# Patient Record
Sex: MALE | Race: WHITE | Employment: OTHER | URBAN - METROPOLITAN AREA
[De-identification: names, ages, dates, MRNs, and addresses within clinical notes are randomized per-mention and may not be internally consistent; named-entity substitution may affect disease eponyms.]

---

## 2017-02-07 ENCOUNTER — HOSPITAL ENCOUNTER (OUTPATIENT)
Dept: MRI IMAGING | Age: 67
Discharge: HOME | End: 2017-02-07
Attending: FAMILY MEDICINE
Payer: COMMERCIAL

## 2017-02-07 DIAGNOSIS — R51.9 HEADACHE, UNSPECIFIED HEADACHE TYPE: ICD-10-CM

## 2017-02-07 PROCEDURE — 70551 MRI BRAIN STEM W/O DYE: CPT

## 2017-12-05 ENCOUNTER — PATIENT OUTREACH (OUTPATIENT)
Dept: CASE MANAGEMENT | Age: 67
End: 2017-12-05

## 2017-12-05 NOTE — PROGRESS NOTES
Patient categorized as high risk patient, level 4. Chart reviewed to determine need for CCM services. Patient on Coumadin, recent PT/INR completed. A1c 6. Patient has established care with Dr. Jackquline Paget. No ED visits and last admission 1 year ago for inpatient rehab. Patient attends one year oncology appointment with S    At this time there is no evidence to suggest that CCM services are needed. This note will not be viewable in [address]

## 2017-12-26 PROBLEM — E11.21 TYPE 2 DIABETES MELLITUS WITH NEPHROPATHY (HCC): Status: ACTIVE | Noted: 2017-12-26

## 2018-04-27 PROBLEM — Z79.01 LONG TERM (CURRENT) USE OF ANTICOAGULANTS: Status: ACTIVE | Noted: 2018-04-27

## 2018-12-28 PROBLEM — L03.031 PARONYCHIA OF GREAT TOE OF RIGHT FOOT: Status: ACTIVE | Noted: 2018-01-01

## 2018-12-28 PROBLEM — L60.8 TOENAIL DEFORMITY: Status: ACTIVE | Noted: 2018-01-01

## 2018-12-28 PROBLEM — D50.9 MICROCYTIC ANEMIA: Status: ACTIVE | Noted: 2018-01-01

## 2019-01-01 ENCOUNTER — ANESTHESIA (OUTPATIENT)
Dept: SURGERY | Age: 69
DRG: 492 | End: 2019-01-01
Payer: MEDICARE

## 2019-01-01 ENCOUNTER — HOSPITAL ENCOUNTER (EMERGENCY)
Age: 69
Discharge: HOME | End: 2019-06-22
Attending: EMERGENCY MEDICINE
Payer: MEDICARE

## 2019-01-01 ENCOUNTER — HOSPITAL ENCOUNTER (OUTPATIENT)
Age: 69
Setting detail: OBSERVATION
Discharge: NURSING, REHAB OR LONG-TERM CARE | End: 2019-06-30
Attending: EMERGENCY MEDICINE | Admitting: EMERGENCY MEDICINE
Payer: MEDICARE

## 2019-01-01 ENCOUNTER — HOSPITAL ENCOUNTER (OUTPATIENT)
Dept: SURGERY | Age: 69
Discharge: HOME | End: 2019-07-18
Payer: MEDICARE

## 2019-01-01 ENCOUNTER — ANESTHESIA EVENT (OUTPATIENT)
Dept: SURGERY | Age: 69
DRG: 492 | End: 2019-01-01
Payer: MEDICARE

## 2019-01-01 ENCOUNTER — APPOINTMENT (OUTPATIENT)
Dept: CT IMAGING | Age: 69
End: 2019-01-01
Attending: EMERGENCY MEDICINE
Payer: MEDICARE

## 2019-01-01 ENCOUNTER — HOSPITAL ENCOUNTER (OUTPATIENT)
Dept: LAB | Age: 69
Discharge: HOME | End: 2019-07-01

## 2019-01-01 ENCOUNTER — APPOINTMENT (OUTPATIENT)
Dept: GENERAL RADIOLOGY | Age: 69
DRG: 492 | End: 2019-01-01
Attending: ORTHOPAEDIC SURGERY
Payer: MEDICARE

## 2019-01-01 ENCOUNTER — HOSPITAL ENCOUNTER (EMERGENCY)
Age: 69
Discharge: ANOTHER HOSPITAL | End: 2019-06-25
Attending: EMERGENCY MEDICINE | Admitting: EMERGENCY MEDICINE
Payer: MEDICARE

## 2019-01-01 ENCOUNTER — APPOINTMENT (OUTPATIENT)
Dept: GENERAL RADIOLOGY | Age: 69
End: 2019-01-01
Attending: INTERNAL MEDICINE
Payer: MEDICARE

## 2019-01-01 ENCOUNTER — APPOINTMENT (OUTPATIENT)
Dept: GENERAL RADIOLOGY | Age: 69
End: 2019-01-01
Attending: FAMILY MEDICINE
Payer: MEDICARE

## 2019-01-01 ENCOUNTER — APPOINTMENT (OUTPATIENT)
Dept: GENERAL RADIOLOGY | Age: 69
End: 2019-01-01
Attending: EMERGENCY MEDICINE
Payer: MEDICARE

## 2019-01-01 ENCOUNTER — HOSPITAL ENCOUNTER (OUTPATIENT)
Dept: LAB | Age: 69
Discharge: HOME | End: 2019-07-12

## 2019-01-01 ENCOUNTER — HOSPITAL ENCOUNTER (OUTPATIENT)
Age: 69
Discharge: STILL ADMITTED OR ADMINISTRATIVE | End: 2019-08-07
Attending: INTERNAL MEDICINE | Admitting: INTERNAL MEDICINE

## 2019-01-01 ENCOUNTER — HOSPITAL ENCOUNTER (INPATIENT)
Age: 69
LOS: 3 days | Discharge: NURSING, REHAB OR LONG-TERM CARE | DRG: 492 | End: 2019-07-25
Attending: ORTHOPAEDIC SURGERY | Admitting: FAMILY MEDICINE
Payer: MEDICARE

## 2019-01-01 VITALS
RESPIRATION RATE: 22 BRPM | BODY MASS INDEX: 44.1 KG/M2 | SYSTOLIC BLOOD PRESSURE: 141 MMHG | DIASTOLIC BLOOD PRESSURE: 63 MMHG | HEIGHT: 71 IN | TEMPERATURE: 97.3 F | HEART RATE: 51 BPM | WEIGHT: 315 LBS | OXYGEN SATURATION: 94 %

## 2019-01-01 VITALS
DIASTOLIC BLOOD PRESSURE: 64 MMHG | RESPIRATION RATE: 18 BRPM | HEART RATE: 65 BPM | WEIGHT: 315 LBS | BODY MASS INDEX: 42.66 KG/M2 | TEMPERATURE: 97.9 F | OXYGEN SATURATION: 98 % | SYSTOLIC BLOOD PRESSURE: 126 MMHG | HEIGHT: 72 IN

## 2019-01-01 VITALS
OXYGEN SATURATION: 93 % | DIASTOLIC BLOOD PRESSURE: 56 MMHG | TEMPERATURE: 98.7 F | WEIGHT: 315 LBS | HEART RATE: 65 BPM | RESPIRATION RATE: 20 BRPM | SYSTOLIC BLOOD PRESSURE: 101 MMHG | BODY MASS INDEX: 49.75 KG/M2

## 2019-01-01 VITALS
OXYGEN SATURATION: 97 % | HEART RATE: 70 BPM | SYSTOLIC BLOOD PRESSURE: 97 MMHG | TEMPERATURE: 97 F | RESPIRATION RATE: 16 BRPM | DIASTOLIC BLOOD PRESSURE: 48 MMHG

## 2019-01-01 VITALS
HEART RATE: 63 BPM | OXYGEN SATURATION: 98 % | DIASTOLIC BLOOD PRESSURE: 88 MMHG | TEMPERATURE: 98 F | SYSTOLIC BLOOD PRESSURE: 147 MMHG | RESPIRATION RATE: 20 BRPM

## 2019-01-01 DIAGNOSIS — R07.9 CHEST PAIN, UNSPECIFIED TYPE: ICD-10-CM

## 2019-01-01 DIAGNOSIS — I48.0 PAROXYSMAL ATRIAL FIBRILLATION WITH RAPID VENTRICULAR RESPONSE (HCC): ICD-10-CM

## 2019-01-01 DIAGNOSIS — S42.392A OTHER CLOSED FRACTURE OF SHAFT OF LEFT HUMERUS, INITIAL ENCOUNTER: Primary | ICD-10-CM

## 2019-01-01 DIAGNOSIS — S42.295D OTHER CLOSED NONDISPLACED FRACTURE OF PROXIMAL END OF LEFT HUMERUS WITH ROUTINE HEALING, SUBSEQUENT ENCOUNTER: Primary | ICD-10-CM

## 2019-01-01 DIAGNOSIS — S20.229A CONTUSION OF BACK, UNSPECIFIED LATERALITY, INITIAL ENCOUNTER: Primary | ICD-10-CM

## 2019-01-01 DIAGNOSIS — N17.9 AKI (ACUTE KIDNEY INJURY) (HCC): ICD-10-CM

## 2019-01-01 DIAGNOSIS — D50.9 IRON DEFICIENCY ANEMIA, UNSPECIFIED IRON DEFICIENCY ANEMIA TYPE: ICD-10-CM

## 2019-01-01 LAB
ABO + RH BLD: NORMAL
ALBUMIN SERPL-MCNC: 2.1 G/DL (ref 3.2–4.6)
ALBUMIN SERPL-MCNC: 2.4 G/DL (ref 3.2–4.6)
ALBUMIN SERPL-MCNC: 2.9 G/DL (ref 3.2–4.6)
ALBUMIN SERPL-MCNC: 3 G/DL (ref 3.2–4.6)
ALBUMIN/GLOB SERPL: 0.4 {RATIO} (ref 1.2–3.5)
ALBUMIN/GLOB SERPL: 0.5 {RATIO} (ref 1.2–3.5)
ALBUMIN/GLOB SERPL: 0.6 {RATIO} (ref 1.2–3.5)
ALBUMIN/GLOB SERPL: 0.6 {RATIO} (ref 1.2–3.5)
ALP SERPL-CCNC: 217 U/L (ref 50–136)
ALP SERPL-CCNC: 222 U/L (ref 50–136)
ALP SERPL-CCNC: 269 U/L (ref 50–136)
ALP SERPL-CCNC: 291 U/L (ref 50–136)
ALT SERPL-CCNC: 23 U/L (ref 12–65)
ALT SERPL-CCNC: 23 U/L (ref 12–65)
ALT SERPL-CCNC: 26 U/L (ref 12–65)
ALT SERPL-CCNC: 28 U/L (ref 12–65)
AMMONIA PLAS-SCNC: 17 UMOL/L (ref 11–32)
AMMONIA PLAS-SCNC: 17 UMOL/L (ref 24.9–68)
ANION GAP SERPL CALC-SCNC: 10 MMOL/L (ref 7–16)
ANION GAP SERPL CALC-SCNC: 10 MMOL/L (ref 7–16)
ANION GAP SERPL CALC-SCNC: 11 MMOL/L (ref 7–16)
ANION GAP SERPL CALC-SCNC: 4 MMOL/L (ref 7–16)
ANION GAP SERPL CALC-SCNC: 5 MMOL/L (ref 7–16)
ANION GAP SERPL CALC-SCNC: 6 MMOL/L (ref 7–16)
ANION GAP SERPL CALC-SCNC: 6 MMOL/L (ref 7–16)
ANION GAP SERPL CALC-SCNC: 7 MMOL/L (ref 7–16)
ANION GAP SERPL CALC-SCNC: 7 MMOL/L (ref 7–16)
ANION GAP SERPL CALC-SCNC: 8 MMOL/L (ref 7–16)
ANION GAP SERPL CALC-SCNC: 9 MMOL/L (ref 7–16)
APPEARANCE UR: ABNORMAL
APPEARANCE UR: CLEAR
APTT PPP: 43.8 SEC (ref 24.7–39.8)
APTT PPP: 44.1 SEC (ref 24.7–39.8)
ARTERIAL PATENCY WRIST A: NO
AST SERPL-CCNC: 19 U/L (ref 15–37)
AST SERPL-CCNC: 26 U/L (ref 15–37)
AST SERPL-CCNC: 27 U/L (ref 15–37)
AST SERPL-CCNC: 35 U/L (ref 15–37)
ATRIAL RATE: 133 BPM
ATRIAL RATE: 416 BPM
BACTERIA SPEC CULT: ABNORMAL
BACTERIA URNS QL MICRO: 0 /HPF
BASE DEFICIT BLD-SCNC: 2 MMOL/L
BASOPHILS # BLD: 0.1 K/UL (ref 0–0.2)
BASOPHILS NFR BLD: 1 % (ref 0–2)
BDY SITE: NORMAL
BILIRUB DIRECT SERPL-MCNC: 0.2 MG/DL
BILIRUB DIRECT SERPL-MCNC: 0.3 MG/DL
BILIRUB SERPL-MCNC: 0.5 MG/DL (ref 0.2–1.1)
BILIRUB SERPL-MCNC: 0.6 MG/DL (ref 0.2–1.1)
BILIRUB UR QL: NEGATIVE
BILIRUB UR QL: NEGATIVE
BLD PROD TYP BPU: NORMAL
BLD PROD TYP BPU: NORMAL
BLOOD GROUP ANTIBODIES SERPL: NORMAL
BODY TEMPERATURE: 98.6
BPU ID: NORMAL
BPU ID: NORMAL
BUN SERPL-MCNC: 37 MG/DL (ref 8–23)
BUN SERPL-MCNC: 41 MG/DL (ref 8–23)
BUN SERPL-MCNC: 43 MG/DL (ref 8–23)
BUN SERPL-MCNC: 50 MG/DL (ref 8–23)
BUN SERPL-MCNC: 50 MG/DL (ref 8–23)
BUN SERPL-MCNC: 52 MG/DL (ref 8–23)
BUN SERPL-MCNC: 54 MG/DL (ref 8–23)
BUN SERPL-MCNC: 57 MG/DL (ref 8–23)
BUN SERPL-MCNC: 59 MG/DL (ref 8–23)
BUN SERPL-MCNC: 72 MG/DL (ref 8–23)
BUN SERPL-MCNC: 83 MG/DL (ref 8–23)
CALCIUM SERPL-MCNC: 7.7 MG/DL (ref 8.3–10.4)
CALCIUM SERPL-MCNC: 8.1 MG/DL (ref 8.3–10.4)
CALCIUM SERPL-MCNC: 8.2 MG/DL (ref 8.3–10.4)
CALCIUM SERPL-MCNC: 8.2 MG/DL (ref 8.3–10.4)
CALCIUM SERPL-MCNC: 8.4 MG/DL (ref 8.3–10.4)
CALCIUM SERPL-MCNC: 8.5 MG/DL (ref 8.3–10.4)
CALCIUM SERPL-MCNC: 8.6 MG/DL (ref 8.3–10.4)
CALCIUM SERPL-MCNC: 8.7 MG/DL (ref 8.3–10.4)
CALCIUM SERPL-MCNC: 8.7 MG/DL (ref 8.3–10.4)
CALCULATED R AXIS, ECG10: 51 DEGREES
CALCULATED R AXIS, ECG10: 60 DEGREES
CALCULATED T AXIS, ECG11: 149 DEGREES
CALCULATED T AXIS, ECG11: 151 DEGREES
CASTS URNS QL MICRO: ABNORMAL /LPF
CHLORIDE SERPL-SCNC: 100 MMOL/L (ref 98–107)
CHLORIDE SERPL-SCNC: 100 MMOL/L (ref 98–107)
CHLORIDE SERPL-SCNC: 101 MMOL/L (ref 98–107)
CHLORIDE SERPL-SCNC: 102 MMOL/L (ref 98–107)
CHLORIDE SERPL-SCNC: 103 MMOL/L (ref 98–107)
CHLORIDE SERPL-SCNC: 103 MMOL/L (ref 98–107)
CHLORIDE SERPL-SCNC: 105 MMOL/L (ref 98–107)
CHLORIDE SERPL-SCNC: 106 MMOL/L (ref 98–107)
CHLORIDE SERPL-SCNC: 97 MMOL/L (ref 98–107)
CO2 BLD-SCNC: 24 MMOL/L
CO2 SERPL-SCNC: 24 MMOL/L (ref 21–32)
CO2 SERPL-SCNC: 24 MMOL/L (ref 21–32)
CO2 SERPL-SCNC: 25 MMOL/L (ref 21–32)
CO2 SERPL-SCNC: 27 MMOL/L (ref 21–32)
CO2 SERPL-SCNC: 29 MMOL/L (ref 21–32)
CO2 SERPL-SCNC: 29 MMOL/L (ref 21–32)
CO2 SERPL-SCNC: 31 MMOL/L (ref 21–32)
CO2 SERPL-SCNC: 31 MMOL/L (ref 21–32)
CO2 SERPL-SCNC: 32 MMOL/L (ref 21–32)
CO2 SERPL-SCNC: 32 MMOL/L (ref 21–32)
CO2 SERPL-SCNC: 34 MMOL/L (ref 21–32)
COLLECT TIME,HTIME: 1505
COLOR UR: YELLOW
COLOR UR: YELLOW
CREAT SERPL-MCNC: 1.19 MG/DL (ref 0.8–1.5)
CREAT SERPL-MCNC: 1.2 MG/DL (ref 0.8–1.5)
CREAT SERPL-MCNC: 1.35 MG/DL (ref 0.8–1.5)
CREAT SERPL-MCNC: 1.47 MG/DL (ref 0.8–1.5)
CREAT SERPL-MCNC: 1.48 MG/DL (ref 0.8–1.5)
CREAT SERPL-MCNC: 1.52 MG/DL (ref 0.8–1.5)
CREAT SERPL-MCNC: 1.65 MG/DL (ref 0.8–1.5)
CREAT SERPL-MCNC: 1.86 MG/DL (ref 0.8–1.5)
CREAT SERPL-MCNC: 1.87 MG/DL (ref 0.8–1.5)
CREAT SERPL-MCNC: 1.9 MG/DL (ref 0.8–1.5)
CREAT SERPL-MCNC: 2.47 MG/DL (ref 0.8–1.5)
CROSSMATCH RESULT,%XM: NORMAL
CROSSMATCH RESULT,%XM: NORMAL
DIAGNOSIS, 93000: NORMAL
DIAGNOSIS, 93000: NORMAL
DIFFERENTIAL METHOD BLD: ABNORMAL
EOSINOPHIL # BLD: 0.1 K/UL (ref 0–0.8)
EOSINOPHIL # BLD: 0.1 K/UL (ref 0–0.8)
EOSINOPHIL # BLD: 0.2 K/UL (ref 0–0.8)
EOSINOPHIL # BLD: 0.3 K/UL (ref 0–0.8)
EOSINOPHIL NFR BLD: 1 % (ref 0.5–7.8)
EOSINOPHIL NFR BLD: 1 % (ref 0.5–7.8)
EOSINOPHIL NFR BLD: 2 % (ref 0.5–7.8)
EOSINOPHIL NFR BLD: 3 % (ref 0.5–7.8)
EPI CELLS #/AREA URNS HPF: ABNORMAL /HPF
ERYTHROCYTE [DISTWIDTH] IN BLOOD BY AUTOMATED COUNT: 18.9 % (ref 11.9–14.6)
ERYTHROCYTE [DISTWIDTH] IN BLOOD BY AUTOMATED COUNT: 19.8 % (ref 11.9–14.6)
ERYTHROCYTE [DISTWIDTH] IN BLOOD BY AUTOMATED COUNT: 19.9 % (ref 11.9–14.6)
ERYTHROCYTE [DISTWIDTH] IN BLOOD BY AUTOMATED COUNT: 20.4 % (ref 11.9–14.6)
ERYTHROCYTE [DISTWIDTH] IN BLOOD BY AUTOMATED COUNT: 20.9 % (ref 11.9–14.6)
ERYTHROCYTE [DISTWIDTH] IN BLOOD BY AUTOMATED COUNT: 20.9 % (ref 11.9–14.6)
ERYTHROCYTE [DISTWIDTH] IN BLOOD BY AUTOMATED COUNT: 21.7 % (ref 11.9–14.6)
ERYTHROCYTE [DISTWIDTH] IN BLOOD BY AUTOMATED COUNT: 22.1 % (ref 11.9–14.6)
ERYTHROCYTE [DISTWIDTH] IN BLOOD BY AUTOMATED COUNT: 22.5 % (ref 11.9–14.6)
ERYTHROCYTE [DISTWIDTH] IN BLOOD BY AUTOMATED COUNT: 22.8 % (ref 11.9–14.6)
ERYTHROCYTE [DISTWIDTH] IN BLOOD BY AUTOMATED COUNT: 23.4 % (ref 11.9–14.6)
ERYTHROCYTE [DISTWIDTH] IN BLOOD BY AUTOMATED COUNT: 23.4 % (ref 11.9–14.6)
EST. AVERAGE GLUCOSE BLD GHB EST-MCNC: 171 MG/DL
EST. AVERAGE GLUCOSE BLD GHB EST-MCNC: 197 MG/DL
FLOW RATE ISTAT,IFRATE: 4 L/MIN
FOLATE SERPL-MCNC: 4.2 NG/ML (ref 3.1–17.5)
GAS FLOW.O2 O2 DELIVERY SYS: NORMAL L/MIN
GLOBULIN SER CALC-MCNC: 4.7 G/DL (ref 2.3–3.5)
GLOBULIN SER CALC-MCNC: 4.7 G/DL (ref 2.3–3.5)
GLOBULIN SER CALC-MCNC: 5 G/DL (ref 2.3–3.5)
GLOBULIN SER CALC-MCNC: 5 G/DL (ref 2.3–3.5)
GLUCOSE BLD STRIP.AUTO-MCNC: 187 MG/DL (ref 65–100)
GLUCOSE BLD STRIP.AUTO-MCNC: 193 MG/DL (ref 65–100)
GLUCOSE BLD STRIP.AUTO-MCNC: 197 MG/DL (ref 65–100)
GLUCOSE BLD STRIP.AUTO-MCNC: 206 MG/DL (ref 65–100)
GLUCOSE BLD STRIP.AUTO-MCNC: 210 MG/DL (ref 65–100)
GLUCOSE BLD STRIP.AUTO-MCNC: 212 MG/DL (ref 65–100)
GLUCOSE BLD STRIP.AUTO-MCNC: 215 MG/DL (ref 65–100)
GLUCOSE BLD STRIP.AUTO-MCNC: 223 MG/DL (ref 65–100)
GLUCOSE BLD STRIP.AUTO-MCNC: 224 MG/DL (ref 65–100)
GLUCOSE BLD STRIP.AUTO-MCNC: 234 MG/DL (ref 65–100)
GLUCOSE BLD STRIP.AUTO-MCNC: 239 MG/DL (ref 65–100)
GLUCOSE BLD STRIP.AUTO-MCNC: 241 MG/DL (ref 65–100)
GLUCOSE BLD STRIP.AUTO-MCNC: 247 MG/DL (ref 65–100)
GLUCOSE BLD STRIP.AUTO-MCNC: 264 MG/DL (ref 65–100)
GLUCOSE BLD STRIP.AUTO-MCNC: 266 MG/DL (ref 65–100)
GLUCOSE BLD STRIP.AUTO-MCNC: 269 MG/DL (ref 65–100)
GLUCOSE BLD STRIP.AUTO-MCNC: 269 MG/DL (ref 65–100)
GLUCOSE BLD STRIP.AUTO-MCNC: 270 MG/DL (ref 65–100)
GLUCOSE BLD STRIP.AUTO-MCNC: 271 MG/DL (ref 65–100)
GLUCOSE BLD STRIP.AUTO-MCNC: 272 MG/DL (ref 65–100)
GLUCOSE BLD STRIP.AUTO-MCNC: 272 MG/DL (ref 65–100)
GLUCOSE BLD STRIP.AUTO-MCNC: 281 MG/DL (ref 65–100)
GLUCOSE BLD STRIP.AUTO-MCNC: 283 MG/DL (ref 65–100)
GLUCOSE BLD STRIP.AUTO-MCNC: 283 MG/DL (ref 65–100)
GLUCOSE BLD STRIP.AUTO-MCNC: 289 MG/DL (ref 65–100)
GLUCOSE BLD STRIP.AUTO-MCNC: 291 MG/DL (ref 65–100)
GLUCOSE BLD STRIP.AUTO-MCNC: 292 MG/DL (ref 65–100)
GLUCOSE BLD STRIP.AUTO-MCNC: 295 MG/DL (ref 65–100)
GLUCOSE BLD STRIP.AUTO-MCNC: 308 MG/DL (ref 65–100)
GLUCOSE BLD STRIP.AUTO-MCNC: 309 MG/DL (ref 65–100)
GLUCOSE BLD STRIP.AUTO-MCNC: 317 MG/DL (ref 65–100)
GLUCOSE BLD STRIP.AUTO-MCNC: 318 MG/DL (ref 65–100)
GLUCOSE BLD STRIP.AUTO-MCNC: 337 MG/DL (ref 65–100)
GLUCOSE SERPL-MCNC: 164 MG/DL (ref 65–100)
GLUCOSE SERPL-MCNC: 186 MG/DL (ref 65–100)
GLUCOSE SERPL-MCNC: 194 MG/DL (ref 65–100)
GLUCOSE SERPL-MCNC: 219 MG/DL (ref 65–100)
GLUCOSE SERPL-MCNC: 235 MG/DL (ref 65–100)
GLUCOSE SERPL-MCNC: 251 MG/DL (ref 65–100)
GLUCOSE SERPL-MCNC: 253 MG/DL (ref 65–100)
GLUCOSE SERPL-MCNC: 258 MG/DL (ref 65–100)
GLUCOSE SERPL-MCNC: 263 MG/DL (ref 65–100)
GLUCOSE SERPL-MCNC: 272 MG/DL (ref 65–100)
GLUCOSE SERPL-MCNC: 286 MG/DL (ref 65–100)
GLUCOSE UR STRIP.AUTO-MCNC: NEGATIVE MG/DL
GLUCOSE UR STRIP.AUTO-MCNC: NEGATIVE MG/DL
HBA1C MFR BLD: 7.6 %
HBA1C MFR BLD: 8.5 %
HCO3 BLD-SCNC: 23 MMOL/L (ref 22–26)
HCT VFR BLD AUTO: 28.4 % (ref 41.1–50.3)
HCT VFR BLD AUTO: 28.6 % (ref 41.1–50.3)
HCT VFR BLD AUTO: 29.1 % (ref 41.1–50.3)
HCT VFR BLD AUTO: 29.2 % (ref 41.1–50.3)
HCT VFR BLD AUTO: 29.7 % (ref 41.1–50.3)
HCT VFR BLD AUTO: 30.3 % (ref 41.1–50.3)
HCT VFR BLD AUTO: 30.5 % (ref 41.1–50.3)
HCT VFR BLD AUTO: 31.1 % (ref 41.1–50.3)
HCT VFR BLD AUTO: 31.7 % (ref 41.1–50.3)
HCT VFR BLD AUTO: 31.8 % (ref 41.1–50.3)
HCT VFR BLD AUTO: 31.9 % (ref 41.1–50.3)
HCT VFR BLD AUTO: 32.2 % (ref 41.1–50.3)
HCT VFR BLD AUTO: 32.8 % (ref 41.1–50.3)
HCT VFR BLD AUTO: 33 % (ref 41.1–50.3)
HCT VFR BLD AUTO: 33.3 % (ref 41.1–50.3)
HCT VFR BLD AUTO: 33.7 % (ref 41.1–50.3)
HGB BLD-MCNC: 10 G/DL (ref 13.6–17.2)
HGB BLD-MCNC: 8.3 G/DL (ref 13.6–17.2)
HGB BLD-MCNC: 8.8 G/DL (ref 13.6–17.2)
HGB BLD-MCNC: 8.8 G/DL (ref 13.6–17.2)
HGB BLD-MCNC: 8.9 G/DL (ref 13.6–17.2)
HGB BLD-MCNC: 9.1 G/DL (ref 13.6–17.2)
HGB BLD-MCNC: 9.1 G/DL (ref 13.6–17.2)
HGB BLD-MCNC: 9.2 G/DL (ref 13.6–17.2)
HGB BLD-MCNC: 9.3 G/DL (ref 13.6–17.2)
HGB BLD-MCNC: 9.4 G/DL (ref 13.6–17.2)
HGB BLD-MCNC: 9.4 G/DL (ref 13.6–17.2)
HGB BLD-MCNC: 9.6 G/DL (ref 13.6–17.2)
HGB BLD-MCNC: 9.7 G/DL (ref 13.6–17.2)
HGB BLD-MCNC: 9.9 G/DL (ref 13.6–17.2)
HGB UR QL STRIP: ABNORMAL
HGB UR QL STRIP: NEGATIVE
IMM GRANULOCYTES # BLD AUTO: 0 K/UL (ref 0–0.5)
IMM GRANULOCYTES # BLD AUTO: 0.1 K/UL (ref 0–0.5)
IMM GRANULOCYTES # BLD AUTO: 0.3 K/UL (ref 0–0.5)
IMM GRANULOCYTES NFR BLD AUTO: 0 % (ref 0–5)
IMM GRANULOCYTES NFR BLD AUTO: 1 % (ref 0–5)
IMM GRANULOCYTES NFR BLD AUTO: 2 % (ref 0–5)
INR BLD: 2.2 (ref 0.9–1.2)
INR PPP: 1.9
INR PPP: 2
INR PPP: 2.1
INR PPP: 2.2
INR PPP: 2.4
INR PPP: 2.4
INR PPP: 2.5
INR PPP: 2.5
INR PPP: 2.9
INR PPP: 2.9
KETONES UR QL STRIP.AUTO: NEGATIVE MG/DL
KETONES UR QL STRIP.AUTO: NEGATIVE MG/DL
LACTATE SERPL-SCNC: 1.5 MMOL/L (ref 0.4–2)
LEUKOCYTE ESTERASE UR QL STRIP.AUTO: ABNORMAL
LEUKOCYTE ESTERASE UR QL STRIP.AUTO: NEGATIVE
LYMPHOCYTES # BLD: 1.2 K/UL (ref 0.5–4.6)
LYMPHOCYTES # BLD: 1.2 K/UL (ref 0.5–4.6)
LYMPHOCYTES # BLD: 1.3 K/UL (ref 0.5–4.6)
LYMPHOCYTES # BLD: 1.6 K/UL (ref 0.5–4.6)
LYMPHOCYTES NFR BLD: 11 % (ref 13–44)
LYMPHOCYTES NFR BLD: 11 % (ref 13–44)
LYMPHOCYTES NFR BLD: 12 % (ref 13–44)
LYMPHOCYTES NFR BLD: 14 % (ref 13–44)
LYMPHOCYTES NFR BLD: 14 % (ref 13–44)
LYMPHOCYTES NFR BLD: 18 % (ref 13–44)
MAGNESIUM SERPL-MCNC: 1.5 MG/DL (ref 1.8–2.4)
MAGNESIUM SERPL-MCNC: 1.6 MG/DL (ref 1.8–2.4)
MAGNESIUM SERPL-MCNC: 1.6 MG/DL (ref 1.8–2.4)
MAGNESIUM SERPL-MCNC: 1.7 MG/DL (ref 1.8–2.4)
MAGNESIUM SERPL-MCNC: 1.8 MG/DL (ref 1.8–2.4)
MAGNESIUM SERPL-MCNC: 2 MG/DL (ref 1.8–2.4)
MCH RBC QN AUTO: 20 PG (ref 26.1–32.9)
MCH RBC QN AUTO: 20.2 PG (ref 26.1–32.9)
MCH RBC QN AUTO: 20.3 PG (ref 26.1–32.9)
MCH RBC QN AUTO: 20.4 PG (ref 26.1–32.9)
MCH RBC QN AUTO: 20.4 PG (ref 26.1–32.9)
MCH RBC QN AUTO: 20.5 PG (ref 26.1–32.9)
MCH RBC QN AUTO: 20.8 PG (ref 26.1–32.9)
MCH RBC QN AUTO: 20.8 PG (ref 26.1–32.9)
MCH RBC QN AUTO: 21.6 PG (ref 26.1–32.9)
MCH RBC QN AUTO: 22.1 PG (ref 26.1–32.9)
MCH RBC QN AUTO: 22.3 PG (ref 26.1–32.9)
MCH RBC QN AUTO: 22.6 PG (ref 26.1–32.9)
MCHC RBC AUTO-ENTMCNC: 27.6 G/DL (ref 31.4–35)
MCHC RBC AUTO-ENTMCNC: 27.6 G/DL (ref 31.4–35)
MCHC RBC AUTO-ENTMCNC: 28.5 G/DL (ref 31.4–35)
MCHC RBC AUTO-ENTMCNC: 28.9 G/DL (ref 31.4–35)
MCHC RBC AUTO-ENTMCNC: 29 G/DL (ref 31.4–35)
MCHC RBC AUTO-ENTMCNC: 29.2 G/DL (ref 31.4–35)
MCHC RBC AUTO-ENTMCNC: 29.2 G/DL (ref 31.4–35)
MCHC RBC AUTO-ENTMCNC: 29.3 G/DL (ref 31.4–35)
MCHC RBC AUTO-ENTMCNC: 30 G/DL (ref 31.4–35)
MCHC RBC AUTO-ENTMCNC: 30.5 G/DL (ref 31.4–35)
MCV RBC AUTO: 70.6 FL (ref 79.6–97.8)
MCV RBC AUTO: 71 FL (ref 79.6–97.8)
MCV RBC AUTO: 71.4 FL (ref 79.6–97.8)
MCV RBC AUTO: 71.5 FL (ref 79.6–97.8)
MCV RBC AUTO: 71.5 FL (ref 79.6–97.8)
MCV RBC AUTO: 71.9 FL (ref 79.6–97.8)
MCV RBC AUTO: 72.5 FL (ref 79.6–97.8)
MCV RBC AUTO: 73 FL (ref 79.6–97.8)
MCV RBC AUTO: 73.7 FL (ref 79.6–97.8)
MCV RBC AUTO: 73.8 FL (ref 79.6–97.8)
MCV RBC AUTO: 74 FL (ref 79.6–97.8)
MCV RBC AUTO: 76.2 FL (ref 79.6–97.8)
MM INDURATION POC: 0 MM (ref 0–5)
MM INDURATION POC: NORMAL 0 NEGATIVE (ref 0–5)
MONOCYTES # BLD: 0.7 K/UL (ref 0.1–1.3)
MONOCYTES # BLD: 0.7 K/UL (ref 0.1–1.3)
MONOCYTES # BLD: 0.8 K/UL (ref 0.1–1.3)
MONOCYTES # BLD: 0.8 K/UL (ref 0.1–1.3)
MONOCYTES # BLD: 0.9 K/UL (ref 0.1–1.3)
MONOCYTES # BLD: 1 K/UL (ref 0.1–1.3)
MONOCYTES NFR BLD: 10 % (ref 4–12)
MONOCYTES NFR BLD: 7 % (ref 4–12)
MONOCYTES NFR BLD: 7 % (ref 4–12)
MONOCYTES NFR BLD: 8 % (ref 4–12)
MONOCYTES NFR BLD: 8 % (ref 4–12)
MONOCYTES NFR BLD: 9 % (ref 4–12)
NEUTS SEG # BLD: 6.4 K/UL (ref 1.7–8.2)
NEUTS SEG # BLD: 6.5 K/UL (ref 1.7–8.2)
NEUTS SEG # BLD: 6.8 K/UL (ref 1.7–8.2)
NEUTS SEG # BLD: 8.1 K/UL (ref 1.7–8.2)
NEUTS SEG # BLD: 8.5 K/UL (ref 1.7–8.2)
NEUTS SEG # BLD: 9.3 K/UL (ref 1.7–8.2)
NEUTS SEG NFR BLD: 70 % (ref 43–78)
NEUTS SEG NFR BLD: 74 % (ref 43–78)
NEUTS SEG NFR BLD: 74 % (ref 43–78)
NEUTS SEG NFR BLD: 77 % (ref 43–78)
NEUTS SEG NFR BLD: 78 % (ref 43–78)
NEUTS SEG NFR BLD: 78 % (ref 43–78)
NITRITE UR QL STRIP.AUTO: NEGATIVE
NITRITE UR QL STRIP.AUTO: POSITIVE
NRBC # BLD: 0 K/UL (ref 0–0.2)
NRBC # BLD: 0.02 K/UL (ref 0–0.2)
NRBC # BLD: 0.02 K/UL (ref 0–0.2)
NRBC # BLD: 0.04 K/UL (ref 0–0.2)
NRBC # BLD: 0.06 K/UL (ref 0–0.2)
NRBC # BLD: 0.07 K/UL (ref 0–0.2)
OTHER OBSERVATIONS,UCOM: ABNORMAL
PCO2 BLD: 38.2 MMHG (ref 35–45)
PH BLD: 7.39 [PH] (ref 7.35–7.45)
PH UR STRIP: 5 [PH] (ref 5–9)
PH UR STRIP: 6.5 [PH] (ref 5–9)
PLATELET # BLD AUTO: 227 K/UL (ref 150–450)
PLATELET # BLD AUTO: 232 K/UL (ref 150–450)
PLATELET # BLD AUTO: 232 K/UL (ref 150–450)
PLATELET # BLD AUTO: 262 K/UL (ref 150–450)
PLATELET # BLD AUTO: 275 K/UL (ref 150–450)
PLATELET # BLD AUTO: 285 K/UL (ref 150–450)
PLATELET # BLD AUTO: 287 K/UL (ref 150–450)
PLATELET # BLD AUTO: 288 K/UL (ref 150–450)
PLATELET # BLD AUTO: 296 K/UL (ref 150–450)
PLATELET # BLD AUTO: 301 K/UL (ref 150–450)
PLATELET # BLD AUTO: 305 K/UL (ref 150–450)
PLATELET # BLD AUTO: 335 K/UL (ref 150–450)
PMV BLD AUTO: 10.4 FL (ref 9.4–12.3)
PMV BLD AUTO: 10.5 FL (ref 9.4–12.3)
PMV BLD AUTO: 10.7 FL (ref 9.4–12.3)
PMV BLD AUTO: 10.8 FL (ref 9.4–12.3)
PMV BLD AUTO: 10.9 FL (ref 9.4–12.3)
PMV BLD AUTO: 11 FL (ref 9.4–12.3)
PMV BLD AUTO: 11 FL (ref 9.4–12.3)
PMV BLD AUTO: 11.1 FL (ref 9.4–12.3)
PMV BLD AUTO: 11.1 FL (ref 9.4–12.3)
PMV BLD AUTO: 11.2 FL (ref 9.4–12.3)
PO2 BLD: 87 MMHG (ref 75–100)
POTASSIUM SERPL-SCNC: 3.6 MMOL/L (ref 3.5–5.1)
POTASSIUM SERPL-SCNC: 3.7 MMOL/L (ref 3.5–5.1)
POTASSIUM SERPL-SCNC: 3.9 MMOL/L (ref 3.5–5.1)
POTASSIUM SERPL-SCNC: 4 MMOL/L (ref 3.5–5.1)
POTASSIUM SERPL-SCNC: 4 MMOL/L (ref 3.5–5.1)
POTASSIUM SERPL-SCNC: 4.1 MMOL/L (ref 3.5–5.1)
POTASSIUM SERPL-SCNC: 4.1 MMOL/L (ref 3.5–5.1)
POTASSIUM SERPL-SCNC: 4.2 MMOL/L (ref 3.5–5.1)
POTASSIUM SERPL-SCNC: 4.3 MMOL/L (ref 3.5–5.1)
POTASSIUM SERPL-SCNC: 4.4 MMOL/L (ref 3.5–5.1)
POTASSIUM SERPL-SCNC: 4.9 MMOL/L (ref 3.5–5.1)
PPD POC: NEGATIVE NEGATIVE
PPD POC: NORMAL NEGATIVE
PROCALCITONIN SERPL-MCNC: 0.1 NG/ML
PROT SERPL-MCNC: 6.8 G/DL (ref 6.3–8.2)
PROT SERPL-MCNC: 7.4 G/DL (ref 6.3–8.2)
PROT SERPL-MCNC: 7.6 G/DL (ref 6.3–8.2)
PROT SERPL-MCNC: 8 G/DL (ref 6.3–8.2)
PROT UR STRIP-MCNC: 100 MG/DL
PROT UR STRIP-MCNC: NEGATIVE MG/DL
PROTHROMBIN TIME: 21.5 SEC (ref 11.7–14.5)
PROTHROMBIN TIME: 22.2 SEC (ref 11.7–14.5)
PROTHROMBIN TIME: 23.4 SEC (ref 11.7–14.5)
PROTHROMBIN TIME: 24.8 SEC (ref 11.7–14.5)
PROTHROMBIN TIME: 25 SEC (ref 11.7–14.5)
PROTHROMBIN TIME: 25 SEC (ref 11.7–14.5)
PROTHROMBIN TIME: 25.8 SEC (ref 11.7–14.5)
PROTHROMBIN TIME: 26.3 SEC (ref 11.7–14.5)
PROTHROMBIN TIME: 26.6 SEC (ref 11.7–14.5)
PROTHROMBIN TIME: 26.8 SEC (ref 11.7–14.5)
PROTHROMBIN TIME: 29.7 SEC (ref 11.7–14.5)
PROTHROMBIN TIME: 30.8 SEC (ref 11.7–14.5)
PT BLD: 25.7 SECS (ref 9.6–11.6)
Q-T INTERVAL, ECG07: 398 MS
Q-T INTERVAL, ECG07: 458 MS
QRS DURATION, ECG06: 106 MS
QRS DURATION, ECG06: 94 MS
QTC CALCULATION (BEZET), ECG08: 447 MS
QTC CALCULATION (BEZET), ECG08: 480 MS
RBC # BLD AUTO: 4 M/UL (ref 4.23–5.6)
RBC # BLD AUTO: 4 M/UL (ref 4.23–5.6)
RBC # BLD AUTO: 4.07 M/UL (ref 4.23–5.6)
RBC # BLD AUTO: 4.08 M/UL (ref 4.23–5.6)
RBC # BLD AUTO: 4.11 M/UL (ref 4.23–5.6)
RBC # BLD AUTO: 4.31 M/UL (ref 4.23–5.6)
RBC # BLD AUTO: 4.32 M/UL (ref 4.23–5.6)
RBC # BLD AUTO: 4.4 M/UL (ref 4.23–5.6)
RBC # BLD AUTO: 4.43 M/UL (ref 4.23–5.6)
RBC # BLD AUTO: 4.56 M/UL (ref 4.23–5.6)
RBC # BLD AUTO: 4.62 M/UL (ref 4.23–5.6)
RBC # BLD AUTO: 4.69 M/UL (ref 4.23–5.6)
RBC #/AREA URNS HPF: >100 /HPF
SAO2 % BLD: 97 % (ref 95–98)
SERVICE CMNT-IMP: ABNORMAL
SERVICE CMNT-IMP: NORMAL
SODIUM SERPL-SCNC: 137 MMOL/L (ref 136–145)
SODIUM SERPL-SCNC: 139 MMOL/L (ref 136–145)
SODIUM SERPL-SCNC: 140 MMOL/L (ref 136–145)
SODIUM SERPL-SCNC: 142 MMOL/L (ref 136–145)
SP GR UR REFRACTOMETRY: 1.01 (ref 1–1.02)
SP GR UR REFRACTOMETRY: 1.02 (ref 1–1.02)
SPECIMEN EXP DATE BLD: NORMAL
SPECIMEN TYPE: NORMAL
STATUS OF UNIT,%ST: NORMAL
STATUS OF UNIT,%ST: NORMAL
T4 FREE SERPL-MCNC: 1.1 NG/DL (ref 0.78–1.46)
TROPONIN I BLD-MCNC: 0 NG/ML (ref 0.02–0.05)
TROPONIN I BLD-MCNC: 0.01 NG/ML (ref 0.02–0.05)
TROPONIN I SERPL-MCNC: <0.02 NG/ML (ref 0.02–0.05)
TSH SERPL DL<=0.005 MIU/L-ACNC: 1.91 UIU/ML
UNIT DIVISION, %UDIV: 0
UNIT DIVISION, %UDIV: 0
UROBILINOGEN UR QL STRIP.AUTO: 0.2 EU/DL (ref 0.2–1)
UROBILINOGEN UR QL STRIP.AUTO: 0.2 EU/DL (ref 0.2–1)
VENTRICULAR RATE, ECG03: 66 BPM
VENTRICULAR RATE, ECG03: 76 BPM
VIT B12 SERPL-MCNC: 1146 PG/ML (ref 193–986)
WBC # BLD AUTO: 10.5 K/UL (ref 4.3–11.1)
WBC # BLD AUTO: 10.9 K/UL (ref 4.3–11.1)
WBC # BLD AUTO: 11.9 K/UL (ref 4.3–11.1)
WBC # BLD AUTO: 13.2 K/UL (ref 4.3–11.1)
WBC # BLD AUTO: 14.2 K/UL (ref 4.3–11.1)
WBC # BLD AUTO: 14.6 K/UL (ref 4.3–11.1)
WBC # BLD AUTO: 17.8 K/UL (ref 4.3–11.1)
WBC # BLD AUTO: 8.7 K/UL (ref 4.3–11.1)
WBC # BLD AUTO: 9.1 K/UL (ref 4.3–11.1)
WBC # BLD AUTO: 9.2 K/UL (ref 4.3–11.1)
WBC # BLD AUTO: 9.2 K/UL (ref 4.3–11.1)
WBC # BLD AUTO: 9.5 K/UL (ref 4.3–11.1)
WBC URNS QL MICRO: >100 /HPF

## 2019-01-01 PROCEDURE — 86592 SYPHILIS TEST NON-TREP QUAL: CPT

## 2019-01-01 PROCEDURE — 97110 THERAPEUTIC EXERCISES: CPT

## 2019-01-01 PROCEDURE — 77010033678 HC OXYGEN DAILY

## 2019-01-01 PROCEDURE — 74011250637 HC RX REV CODE- 250/637: Performed by: NURSE PRACTITIONER

## 2019-01-01 PROCEDURE — 99218 HC RM OBSERVATION: CPT

## 2019-01-01 PROCEDURE — 74011250636 HC RX REV CODE- 250/636: Performed by: ANESTHESIOLOGY

## 2019-01-01 PROCEDURE — 77030003862 HC BIT DRL SYNT -B: Performed by: ORTHOPAEDIC SURGERY

## 2019-01-01 PROCEDURE — 83735 ASSAY OF MAGNESIUM: CPT

## 2019-01-01 PROCEDURE — 77030020263 HC SOL INJ SOD CL0.9% LFCR 1000ML

## 2019-01-01 PROCEDURE — 93005 ELECTROCARDIOGRAM TRACING: CPT | Performed by: EMERGENCY MEDICINE

## 2019-01-01 PROCEDURE — 74011636637 HC RX REV CODE- 636/637: Performed by: NURSE PRACTITIONER

## 2019-01-01 PROCEDURE — 74011636637 HC RX REV CODE- 636/637: Performed by: FAMILY MEDICINE

## 2019-01-01 PROCEDURE — 85610 PROTHROMBIN TIME: CPT

## 2019-01-01 PROCEDURE — 82803 BLOOD GASES ANY COMBINATION: CPT

## 2019-01-01 PROCEDURE — 87186 SC STD MICRODIL/AGAR DIL: CPT

## 2019-01-01 PROCEDURE — 74011250637 HC RX REV CODE- 250/637: Performed by: EMERGENCY MEDICINE

## 2019-01-01 PROCEDURE — 94640 AIRWAY INHALATION TREATMENT: CPT

## 2019-01-01 PROCEDURE — 29580 STRAPPING UNNA BOOT: CPT

## 2019-01-01 PROCEDURE — 96375 TX/PRO/DX INJ NEW DRUG ADDON: CPT

## 2019-01-01 PROCEDURE — 83036 HEMOGLOBIN GLYCOSYLATED A1C: CPT

## 2019-01-01 PROCEDURE — 80048 BASIC METABOLIC PNL TOTAL CA: CPT

## 2019-01-01 PROCEDURE — 97530 THERAPEUTIC ACTIVITIES: CPT

## 2019-01-01 PROCEDURE — 74011000250 HC RX REV CODE- 250: Performed by: FAMILY MEDICINE

## 2019-01-01 PROCEDURE — 65610000001 HC ROOM ICU GENERAL

## 2019-01-01 PROCEDURE — 65270000029 HC RM PRIVATE

## 2019-01-01 PROCEDURE — 76010010054 HC POST OP PAIN BLOCK: Performed by: ORTHOPAEDIC SURGERY

## 2019-01-01 PROCEDURE — 74011250637 HC RX REV CODE- 250/637: Performed by: INTERNAL MEDICINE

## 2019-01-01 PROCEDURE — 72070 X-RAY EXAM THORAC SPINE 2VWS: CPT

## 2019-01-01 PROCEDURE — 74011250636 HC RX REV CODE- 250/636: Performed by: EMERGENCY MEDICINE

## 2019-01-01 PROCEDURE — 84145 PROCALCITONIN (PCT): CPT

## 2019-01-01 PROCEDURE — 86923 COMPATIBILITY TEST ELECTRIC: CPT

## 2019-01-01 PROCEDURE — 77030013107 HC CUF CRYOCUF DJOR -B: Performed by: ORTHOPAEDIC SURGERY

## 2019-01-01 PROCEDURE — 85027 COMPLETE CBC AUTOMATED: CPT

## 2019-01-01 PROCEDURE — 74011250636 HC RX REV CODE- 250/636: Performed by: INTERNAL MEDICINE

## 2019-01-01 PROCEDURE — 87088 URINE BACTERIA CULTURE: CPT

## 2019-01-01 PROCEDURE — 96374 THER/PROPH/DIAG INJ IV PUSH: CPT

## 2019-01-01 PROCEDURE — 77030034849

## 2019-01-01 PROCEDURE — 71046 X-RAY EXAM CHEST 2 VIEWS: CPT

## 2019-01-01 PROCEDURE — 71045 X-RAY EXAM CHEST 1 VIEW: CPT

## 2019-01-01 PROCEDURE — 74011250636 HC RX REV CODE- 250/636: Performed by: ORTHOPAEDIC SURGERY

## 2019-01-01 PROCEDURE — 77030027138 HC INCENT SPIROMETER -A

## 2019-01-01 PROCEDURE — 74011250637 HC RX REV CODE- 250/637: Performed by: FAMILY MEDICINE

## 2019-01-01 PROCEDURE — 82140 ASSAY OF AMMONIA: CPT

## 2019-01-01 PROCEDURE — 85025 COMPLETE CBC W/AUTO DIFF WBC: CPT

## 2019-01-01 PROCEDURE — 77030014058 HC TIP IRR PULSVC ZIMM -A: Performed by: ORTHOPAEDIC SURGERY

## 2019-01-01 PROCEDURE — 82746 ASSAY OF FOLIC ACID SERUM: CPT

## 2019-01-01 PROCEDURE — 74011000250 HC RX REV CODE- 250: Performed by: ANESTHESIOLOGY

## 2019-01-01 PROCEDURE — 87086 URINE CULTURE/COLONY COUNT: CPT

## 2019-01-01 PROCEDURE — 77030020255 HC SOL INJ LR 1000ML BG

## 2019-01-01 PROCEDURE — 87040 BLOOD CULTURE FOR BACTERIA: CPT

## 2019-01-01 PROCEDURE — 36600 WITHDRAWAL OF ARTERIAL BLOOD: CPT

## 2019-01-01 PROCEDURE — 36415 COLL VENOUS BLD VENIPUNCTURE: CPT

## 2019-01-01 PROCEDURE — C1713 ANCHOR/SCREW BN/BN,TIS/BN: HCPCS | Performed by: ORTHOPAEDIC SURGERY

## 2019-01-01 PROCEDURE — 97162 PT EVAL MOD COMPLEX 30 MIN: CPT

## 2019-01-01 PROCEDURE — 82962 GLUCOSE BLOOD TEST: CPT

## 2019-01-01 PROCEDURE — 76210000016 HC OR PH I REC 1 TO 1.5 HR: Performed by: ORTHOPAEDIC SURGERY

## 2019-01-01 PROCEDURE — 30233N1 TRANSFUSION OF NONAUTOLOGOUS RED BLOOD CELLS INTO PERIPHERAL VEIN, PERCUTANEOUS APPROACH: ICD-10-PCS | Performed by: ANESTHESIOLOGY

## 2019-01-01 PROCEDURE — 72100 X-RAY EXAM L-S SPINE 2/3 VWS: CPT

## 2019-01-01 PROCEDURE — 85730 THROMBOPLASTIN TIME PARTIAL: CPT

## 2019-01-01 PROCEDURE — 76942 ECHO GUIDE FOR BIOPSY: CPT | Performed by: ORTHOPAEDIC SURGERY

## 2019-01-01 PROCEDURE — 70450 CT HEAD/BRAIN W/O DYE: CPT

## 2019-01-01 PROCEDURE — 96361 HYDRATE IV INFUSION ADD-ON: CPT

## 2019-01-01 PROCEDURE — 73060 X-RAY EXAM OF HUMERUS: CPT

## 2019-01-01 PROCEDURE — 99285 EMERGENCY DEPT VISIT HI MDM: CPT | Performed by: EMERGENCY MEDICINE

## 2019-01-01 PROCEDURE — 36430 TRANSFUSION BLD/BLD COMPNT: CPT

## 2019-01-01 PROCEDURE — 96375 TX/PRO/DX INJ NEW DRUG ADDON: CPT | Performed by: EMERGENCY MEDICINE

## 2019-01-01 PROCEDURE — 94760 N-INVAS EAR/PLS OXIMETRY 1: CPT

## 2019-01-01 PROCEDURE — 85014 HEMATOCRIT: CPT

## 2019-01-01 PROCEDURE — 74011250636 HC RX REV CODE- 250/636

## 2019-01-01 PROCEDURE — 77030008462 HC STPLR SKN PROX J&J -A: Performed by: ORTHOPAEDIC SURGERY

## 2019-01-01 PROCEDURE — 74011250636 HC RX REV CODE- 250/636: Performed by: FAMILY MEDICINE

## 2019-01-01 PROCEDURE — 74011250637 HC RX REV CODE- 250/637: Performed by: ORTHOPAEDIC SURGERY

## 2019-01-01 PROCEDURE — 84439 ASSAY OF FREE THYROXINE: CPT

## 2019-01-01 PROCEDURE — 77030019908 HC STETH ESOPH SIMS -A: Performed by: ANESTHESIOLOGY

## 2019-01-01 PROCEDURE — P9016 RBC LEUKOCYTES REDUCED: HCPCS

## 2019-01-01 PROCEDURE — 77030018673: Performed by: ORTHOPAEDIC SURGERY

## 2019-01-01 PROCEDURE — 81001 URINALYSIS AUTO W/SCOPE: CPT

## 2019-01-01 PROCEDURE — 74011000250 HC RX REV CODE- 250

## 2019-01-01 PROCEDURE — 77030011283 HC ELECTRD NDL COVD -A: Performed by: ORTHOPAEDIC SURGERY

## 2019-01-01 PROCEDURE — 74011250637 HC RX REV CODE- 250/637: Performed by: PHYSICIAN ASSISTANT

## 2019-01-01 PROCEDURE — 73030 X-RAY EXAM OF SHOULDER: CPT

## 2019-01-01 PROCEDURE — 77030020407 HC IV BLD WRMR ST 3M -A: Performed by: ANESTHESIOLOGY

## 2019-01-01 PROCEDURE — 77030008703 HC TU ET UNCUF COVD -A: Performed by: ANESTHESIOLOGY

## 2019-01-01 PROCEDURE — 80053 COMPREHEN METABOLIC PANEL: CPT

## 2019-01-01 PROCEDURE — 76060000038 HC ANESTHESIA 3.5 TO 4 HR: Performed by: ORTHOPAEDIC SURGERY

## 2019-01-01 PROCEDURE — 77030019605

## 2019-01-01 PROCEDURE — 85018 HEMOGLOBIN: CPT

## 2019-01-01 PROCEDURE — 77030016570 HC BLNKT BAIR HGGR 3M -B: Performed by: ANESTHESIOLOGY

## 2019-01-01 PROCEDURE — 86900 BLOOD TYPING SEROLOGIC ABO: CPT

## 2019-01-01 PROCEDURE — 65660000000 HC RM CCU STEPDOWN

## 2019-01-01 PROCEDURE — 77030021678 HC GLIDESCP STAT DISP VERT -B: Performed by: ANESTHESIOLOGY

## 2019-01-01 PROCEDURE — 77030002916 HC SUT ETHLN J&J -A: Performed by: ORTHOPAEDIC SURGERY

## 2019-01-01 PROCEDURE — 86580 TB INTRADERMAL TEST: CPT | Performed by: ORTHOPAEDIC SURGERY

## 2019-01-01 PROCEDURE — 84484 ASSAY OF TROPONIN QUANT: CPT

## 2019-01-01 PROCEDURE — 96376 TX/PRO/DX INJ SAME DRUG ADON: CPT

## 2019-01-01 PROCEDURE — 84443 ASSAY THYROID STIM HORMONE: CPT

## 2019-01-01 PROCEDURE — 97161 PT EVAL LOW COMPLEX 20 MIN: CPT

## 2019-01-01 PROCEDURE — 77030032490 HC SLV COMPR SCD KNE COVD -B

## 2019-01-01 PROCEDURE — 87641 MR-STAPH DNA AMP PROBE: CPT

## 2019-01-01 PROCEDURE — 77030003602 HC NDL NRV BLK BBMI -B: Performed by: ANESTHESIOLOGY

## 2019-01-01 PROCEDURE — A4565 SLINGS: HCPCS | Performed by: ORTHOPAEDIC SURGERY

## 2019-01-01 PROCEDURE — 80076 HEPATIC FUNCTION PANEL: CPT

## 2019-01-01 PROCEDURE — 0PSG04Z REPOSITION LEFT HUMERAL SHAFT WITH INTERNAL FIXATION DEVICE, OPEN APPROACH: ICD-10-PCS | Performed by: ORTHOPAEDIC SURGERY

## 2019-01-01 PROCEDURE — 74011000302 HC RX REV CODE- 302: Performed by: EMERGENCY MEDICINE

## 2019-01-01 PROCEDURE — 86580 TB INTRADERMAL TEST: CPT | Performed by: EMERGENCY MEDICINE

## 2019-01-01 PROCEDURE — 74011000250 HC RX REV CODE- 250: Performed by: NURSE PRACTITIONER

## 2019-01-01 PROCEDURE — 74011000258 HC RX REV CODE- 258: Performed by: FAMILY MEDICINE

## 2019-01-01 PROCEDURE — 83605 ASSAY OF LACTIC ACID: CPT

## 2019-01-01 PROCEDURE — 74011000302 HC RX REV CODE- 302: Performed by: ORTHOPAEDIC SURGERY

## 2019-01-01 PROCEDURE — 76010000173 HC OR TIME 3 TO 3.5 HR INTENSV-TIER 1: Performed by: ORTHOPAEDIC SURGERY

## 2019-01-01 PROCEDURE — 74011636637 HC RX REV CODE- 636/637: Performed by: ANESTHESIOLOGY

## 2019-01-01 PROCEDURE — 81003 URINALYSIS AUTO W/O SCOPE: CPT

## 2019-01-01 PROCEDURE — 96374 THER/PROPH/DIAG INJ IV PUSH: CPT | Performed by: EMERGENCY MEDICINE

## 2019-01-01 PROCEDURE — 82607 VITAMIN B-12: CPT

## 2019-01-01 PROCEDURE — 84425 ASSAY OF VITAMIN B-1: CPT

## 2019-01-01 PROCEDURE — 77030018836 HC SOL IRR NACL ICUM -A: Performed by: ORTHOPAEDIC SURGERY

## 2019-01-01 PROCEDURE — 97167 OT EVAL HIGH COMPLEX 60 MIN: CPT

## 2019-01-01 DEVICE — SCREW BNE L30MM DIA4MM CORT S STL NONCANNULATED ST LOK FULL: Type: IMPLANTABLE DEVICE | Site: ARM | Status: FUNCTIONAL

## 2019-01-01 DEVICE — IMPLANTABLE DEVICE: Type: IMPLANTABLE DEVICE | Site: ARM | Status: FUNCTIONAL

## 2019-01-01 RX ORDER — WARFARIN 2.5 MG/1
2.5 TABLET ORAL ONCE
Status: COMPLETED | OUTPATIENT
Start: 2019-01-01 | End: 2019-01-01

## 2019-01-01 RX ORDER — SODIUM CHLORIDE, SODIUM LACTATE, POTASSIUM CHLORIDE, CALCIUM CHLORIDE 600; 310; 30; 20 MG/100ML; MG/100ML; MG/100ML; MG/100ML
75 INJECTION, SOLUTION INTRAVENOUS CONTINUOUS
Status: DISCONTINUED | OUTPATIENT
Start: 2019-01-01 | End: 2019-01-01 | Stop reason: HOSPADM

## 2019-01-01 RX ORDER — METOPROLOL TARTRATE 25 MG/1
12.5 TABLET, FILM COATED ORAL 2 TIMES DAILY
Status: DISCONTINUED | OUTPATIENT
Start: 2019-01-01 | End: 2019-01-01 | Stop reason: HOSPADM

## 2019-01-01 RX ORDER — INSULIN LISPRO 100 [IU]/ML
INJECTION, SOLUTION INTRAVENOUS; SUBCUTANEOUS
Status: DISCONTINUED | OUTPATIENT
Start: 2019-01-01 | End: 2019-01-01 | Stop reason: HOSPADM

## 2019-01-01 RX ORDER — FUROSEMIDE 10 MG/ML
40 INJECTION INTRAMUSCULAR; INTRAVENOUS DAILY
Status: DISCONTINUED | OUTPATIENT
Start: 2019-01-01 | End: 2019-01-01 | Stop reason: HOSPADM

## 2019-01-01 RX ORDER — PHENYLEPHRINE 40 MG/250 ML (160 MCG/ML) IN 0.9 % SODIUM CHLORIDE IV
10-100 SOLUTION
Status: DISCONTINUED | OUTPATIENT
Start: 2019-01-01 | End: 2019-01-01

## 2019-01-01 RX ORDER — ALBUTEROL SULFATE 0.83 MG/ML
2.5 SOLUTION RESPIRATORY (INHALATION)
Status: DISCONTINUED | OUTPATIENT
Start: 2019-01-01 | End: 2019-01-01 | Stop reason: HOSPADM

## 2019-01-01 RX ORDER — MIDAZOLAM HYDROCHLORIDE 1 MG/ML
2 INJECTION, SOLUTION INTRAMUSCULAR; INTRAVENOUS
Status: DISCONTINUED | OUTPATIENT
Start: 2019-01-01 | End: 2019-01-01 | Stop reason: HOSPADM

## 2019-01-01 RX ORDER — ONDANSETRON 2 MG/ML
4 INJECTION INTRAMUSCULAR; INTRAVENOUS
Status: COMPLETED | OUTPATIENT
Start: 2019-01-01 | End: 2019-01-01

## 2019-01-01 RX ORDER — LORAZEPAM 1 MG/1
1 TABLET ORAL
COMMUNITY
End: 2019-01-01

## 2019-01-01 RX ORDER — GUAIFENESIN 100 MG/5ML
81 LIQUID (ML) ORAL DAILY
Status: DISCONTINUED | OUTPATIENT
Start: 2019-01-01 | End: 2019-01-01 | Stop reason: HOSPADM

## 2019-01-01 RX ORDER — INSULIN GLARGINE 100 [IU]/ML
15 INJECTION, SOLUTION SUBCUTANEOUS DAILY
Status: DISCONTINUED | OUTPATIENT
Start: 2019-01-01 | End: 2019-01-01 | Stop reason: HOSPADM

## 2019-01-01 RX ORDER — SODIUM CHLORIDE 9 MG/ML
75 INJECTION, SOLUTION INTRAVENOUS CONTINUOUS
Status: DISCONTINUED | OUTPATIENT
Start: 2019-01-01 | End: 2019-01-01

## 2019-01-01 RX ORDER — ACETAMINOPHEN 325 MG/1
650 TABLET ORAL EVERY 8 HOURS
Status: DISCONTINUED | OUTPATIENT
Start: 2019-01-01 | End: 2019-01-01 | Stop reason: HOSPADM

## 2019-01-01 RX ORDER — ATORVASTATIN CALCIUM 40 MG/1
40 TABLET, FILM COATED ORAL
Status: DISCONTINUED | OUTPATIENT
Start: 2019-01-01 | End: 2019-01-01 | Stop reason: HOSPADM

## 2019-01-01 RX ORDER — ONDANSETRON 2 MG/ML
INJECTION INTRAMUSCULAR; INTRAVENOUS AS NEEDED
Status: DISCONTINUED | OUTPATIENT
Start: 2019-01-01 | End: 2019-01-01 | Stop reason: HOSPADM

## 2019-01-01 RX ORDER — SODIUM CHLORIDE 0.9 % (FLUSH) 0.9 %
5-40 SYRINGE (ML) INJECTION EVERY 8 HOURS
Status: CANCELLED | OUTPATIENT
Start: 2019-01-01

## 2019-01-01 RX ORDER — OXYCODONE HYDROCHLORIDE 5 MG/1
10 TABLET ORAL
Status: DISCONTINUED | OUTPATIENT
Start: 2019-01-01 | End: 2019-01-01 | Stop reason: HOSPADM

## 2019-01-01 RX ORDER — FENTANYL CITRATE 50 UG/ML
100 INJECTION, SOLUTION INTRAMUSCULAR; INTRAVENOUS
Status: COMPLETED | OUTPATIENT
Start: 2019-01-01 | End: 2019-01-01

## 2019-01-01 RX ORDER — NALOXONE HYDROCHLORIDE 0.4 MG/ML
0.1 INJECTION, SOLUTION INTRAMUSCULAR; INTRAVENOUS; SUBCUTANEOUS
Status: DISCONTINUED | OUTPATIENT
Start: 2019-01-01 | End: 2019-01-01 | Stop reason: HOSPADM

## 2019-01-01 RX ORDER — DIPHENHYDRAMINE HYDROCHLORIDE 50 MG/ML
12.5 INJECTION, SOLUTION INTRAMUSCULAR; INTRAVENOUS
Status: DISCONTINUED | OUTPATIENT
Start: 2019-01-01 | End: 2019-01-01 | Stop reason: HOSPADM

## 2019-01-01 RX ORDER — SODIUM CHLORIDE 0.9 % (FLUSH) 0.9 %
5-40 SYRINGE (ML) INJECTION EVERY 8 HOURS
Status: DISCONTINUED | OUTPATIENT
Start: 2019-01-01 | End: 2019-01-01 | Stop reason: HOSPADM

## 2019-01-01 RX ORDER — LIDOCAINE 4 G/100G
2 PATCH TOPICAL EVERY 24 HOURS
Status: DISCONTINUED | OUTPATIENT
Start: 2019-01-01 | End: 2019-01-01 | Stop reason: HOSPADM

## 2019-01-01 RX ORDER — METOPROLOL TARTRATE 25 MG/1
12.5 TABLET, FILM COATED ORAL 2 TIMES DAILY
Qty: 30 TAB | Refills: 0 | Status: SHIPPED | OUTPATIENT
Start: 2019-01-01 | End: 2019-01-01

## 2019-01-01 RX ORDER — OXYCODONE HYDROCHLORIDE 15 MG/1
15 TABLET ORAL
Status: DISCONTINUED | OUTPATIENT
Start: 2019-01-01 | End: 2019-01-01 | Stop reason: HOSPADM

## 2019-01-01 RX ORDER — SODIUM CHLORIDE 0.9 % (FLUSH) 0.9 %
5-40 SYRINGE (ML) INJECTION AS NEEDED
Status: DISCONTINUED | OUTPATIENT
Start: 2019-01-01 | End: 2019-01-01 | Stop reason: HOSPADM

## 2019-01-01 RX ORDER — IPRATROPIUM BROMIDE AND ALBUTEROL SULFATE 2.5; .5 MG/3ML; MG/3ML
3 SOLUTION RESPIRATORY (INHALATION)
Status: DISCONTINUED | OUTPATIENT
Start: 2019-01-01 | End: 2019-01-01

## 2019-01-01 RX ORDER — LANOLIN ALCOHOL/MO/W.PET/CERES
325 CREAM (GRAM) TOPICAL 2 TIMES DAILY WITH MEALS
Qty: 60 TAB | Refills: 0 | Status: SHIPPED
Start: 2019-01-01 | End: 2019-08-24

## 2019-01-01 RX ORDER — MAGNESIUM SULFATE HEPTAHYDRATE 40 MG/ML
2 INJECTION, SOLUTION INTRAVENOUS ONCE
Status: ACTIVE | OUTPATIENT
Start: 2019-01-01 | End: 2019-01-01

## 2019-01-01 RX ORDER — AZELASTINE 1 MG/ML
1 SPRAY, METERED NASAL 2 TIMES DAILY
Status: DISCONTINUED | OUTPATIENT
Start: 2019-01-01 | End: 2019-01-01 | Stop reason: HOSPADM

## 2019-01-01 RX ORDER — CELECOXIB 200 MG/1
200 CAPSULE ORAL ONCE
Status: ACTIVE | OUTPATIENT
Start: 2019-01-01 | End: 2019-01-01

## 2019-01-01 RX ORDER — DULOXETIN HYDROCHLORIDE 60 MG/1
60 CAPSULE, DELAYED RELEASE ORAL DAILY
Status: DISCONTINUED | OUTPATIENT
Start: 2019-01-01 | End: 2019-01-01 | Stop reason: HOSPADM

## 2019-01-01 RX ORDER — ATORVASTATIN CALCIUM 40 MG/1
40 TABLET, FILM COATED ORAL DAILY
Status: DISCONTINUED | OUTPATIENT
Start: 2019-01-01 | End: 2019-01-01 | Stop reason: HOSPADM

## 2019-01-01 RX ORDER — ALBUTEROL SULFATE 0.83 MG/ML
2.5 SOLUTION RESPIRATORY (INHALATION)
Status: CANCELLED | OUTPATIENT
Start: 2019-01-01

## 2019-01-01 RX ORDER — INSULIN LISPRO 100 [IU]/ML
4 INJECTION, SOLUTION INTRAVENOUS; SUBCUTANEOUS ONCE
Status: COMPLETED | OUTPATIENT
Start: 2019-01-01 | End: 2019-01-01

## 2019-01-01 RX ORDER — OXYCODONE HYDROCHLORIDE 5 MG/1
5 TABLET ORAL
Status: DISCONTINUED | OUTPATIENT
Start: 2019-01-01 | End: 2019-01-01 | Stop reason: HOSPADM

## 2019-01-01 RX ORDER — WARFARIN SODIUM 5 MG/1
5 TABLET ORAL
Status: DISCONTINUED | OUTPATIENT
Start: 2019-01-01 | End: 2019-01-01 | Stop reason: HOSPADM

## 2019-01-01 RX ORDER — HYDROCODONE BITARTRATE AND ACETAMINOPHEN 7.5; 325 MG/1; MG/1
1 TABLET ORAL
COMMUNITY
End: 2019-01-01

## 2019-01-01 RX ORDER — MAGNESIUM SULFATE HEPTAHYDRATE 40 MG/ML
2 INJECTION, SOLUTION INTRAVENOUS ONCE
Status: COMPLETED | OUTPATIENT
Start: 2019-01-01 | End: 2019-01-01

## 2019-01-01 RX ORDER — WARFARIN SODIUM 5 MG/1
5 TABLET ORAL
COMMUNITY

## 2019-01-01 RX ORDER — VANCOMYCIN 2 GRAM/500 ML IN 0.9 % SODIUM CHLORIDE INTRAVENOUS
2000 ONCE
Status: COMPLETED | OUTPATIENT
Start: 2019-01-01 | End: 2019-01-01

## 2019-01-01 RX ORDER — BUMETANIDE 1 MG/1
1 TABLET ORAL DAILY
Status: DISCONTINUED | OUTPATIENT
Start: 2019-01-01 | End: 2019-01-01 | Stop reason: HOSPADM

## 2019-01-01 RX ORDER — INSULIN GLARGINE 100 [IU]/ML
10 INJECTION, SOLUTION SUBCUTANEOUS
Status: DISCONTINUED | OUTPATIENT
Start: 2019-01-01 | End: 2019-01-01 | Stop reason: HOSPADM

## 2019-01-01 RX ORDER — INSULIN LISPRO 100 [IU]/ML
INJECTION, SOLUTION INTRAVENOUS; SUBCUTANEOUS
Status: CANCELLED | OUTPATIENT
Start: 2019-01-01

## 2019-01-01 RX ORDER — INSULIN LISPRO 100 [IU]/ML
INJECTION, SOLUTION INTRAVENOUS; SUBCUTANEOUS
COMMUNITY

## 2019-01-01 RX ORDER — PROMETHAZINE HYDROCHLORIDE 25 MG/1
25 TABLET ORAL
Status: DISCONTINUED | OUTPATIENT
Start: 2019-01-01 | End: 2019-01-01 | Stop reason: HOSPADM

## 2019-01-01 RX ORDER — TEMAZEPAM 15 MG/1
15 CAPSULE ORAL
Status: DISCONTINUED | OUTPATIENT
Start: 2019-01-01 | End: 2019-01-01

## 2019-01-01 RX ORDER — WARFARIN 2.5 MG/1
2.5 TABLET ORAL
Status: DISCONTINUED | OUTPATIENT
Start: 2019-01-01 | End: 2019-01-01 | Stop reason: HOSPADM

## 2019-01-01 RX ORDER — WARFARIN SODIUM 5 MG/1
2.5 TABLET ORAL
COMMUNITY
End: 2019-01-01

## 2019-01-01 RX ORDER — VANCOMYCIN 2 GRAM/500 ML IN 0.9 % SODIUM CHLORIDE INTRAVENOUS
2000 EVERY 12 HOURS
Status: COMPLETED | OUTPATIENT
Start: 2019-01-01 | End: 2019-01-01

## 2019-01-01 RX ORDER — GUAIFENESIN 100 MG/5ML
324 LIQUID (ML) ORAL
Status: COMPLETED | OUTPATIENT
Start: 2019-01-01 | End: 2019-01-01

## 2019-01-01 RX ORDER — METAXALONE 800 MG/1
400 TABLET ORAL
Status: DISCONTINUED | OUTPATIENT
Start: 2019-01-01 | End: 2019-01-01 | Stop reason: HOSPADM

## 2019-01-01 RX ORDER — ETOMIDATE 2 MG/ML
INJECTION INTRAVENOUS AS NEEDED
Status: DISCONTINUED | OUTPATIENT
Start: 2019-01-01 | End: 2019-01-01 | Stop reason: HOSPADM

## 2019-01-01 RX ORDER — AZELASTINE HYDROCHLORIDE 0.5 MG/ML
1 SOLUTION/ DROPS OPHTHALMIC
Status: DISCONTINUED | OUTPATIENT
Start: 2019-01-01 | End: 2019-01-01 | Stop reason: HOSPADM

## 2019-01-01 RX ORDER — HYDROMORPHONE HYDROCHLORIDE 2 MG/ML
0.5 INJECTION, SOLUTION INTRAMUSCULAR; INTRAVENOUS; SUBCUTANEOUS
Status: DISCONTINUED | OUTPATIENT
Start: 2019-01-01 | End: 2019-01-01 | Stop reason: HOSPADM

## 2019-01-01 RX ORDER — TRAMADOL HYDROCHLORIDE 50 MG/1
50 TABLET ORAL
Status: DISCONTINUED | OUTPATIENT
Start: 2019-01-01 | End: 2019-01-01 | Stop reason: HOSPADM

## 2019-01-01 RX ORDER — POLYETHYLENE GLYCOL 3350 17 G/17G
17 POWDER, FOR SOLUTION ORAL DAILY
Status: DISCONTINUED | OUTPATIENT
Start: 2019-01-01 | End: 2019-01-01 | Stop reason: HOSPADM

## 2019-01-01 RX ORDER — DOCUSATE SODIUM 100 MG/1
100 CAPSULE, LIQUID FILLED ORAL 2 TIMES DAILY
Status: DISCONTINUED | OUTPATIENT
Start: 2019-01-01 | End: 2019-01-01 | Stop reason: HOSPADM

## 2019-01-01 RX ORDER — HEPARIN SODIUM 5000 [USP'U]/100ML
12-25 INJECTION, SOLUTION INTRAVENOUS
Status: DISCONTINUED | OUTPATIENT
Start: 2019-01-01 | End: 2019-01-01

## 2019-01-01 RX ORDER — HYDROCODONE BITARTRATE AND ACETAMINOPHEN 5; 325 MG/1; MG/1
1 TABLET ORAL
Qty: 8 TAB | Refills: 0 | Status: ON HOLD | OUTPATIENT
Start: 2019-01-01 | End: 2019-01-01 | Stop reason: DRUGHIGH

## 2019-01-01 RX ORDER — ACETAMINOPHEN 325 MG/1
650 TABLET ORAL EVERY 8 HOURS
Qty: 42 TAB | Refills: 0 | Status: SHIPPED | OUTPATIENT
Start: 2019-01-01 | End: 2019-01-01

## 2019-01-01 RX ORDER — HYDROMORPHONE HYDROCHLORIDE 2 MG/ML
1 INJECTION, SOLUTION INTRAMUSCULAR; INTRAVENOUS; SUBCUTANEOUS
Status: DISCONTINUED | OUTPATIENT
Start: 2019-01-01 | End: 2019-01-01

## 2019-01-01 RX ORDER — TRAMADOL HYDROCHLORIDE 50 MG/1
50 TABLET ORAL
Qty: 9 TAB | Refills: 0 | Status: SHIPPED | OUTPATIENT
Start: 2019-01-01 | End: 2019-01-01

## 2019-01-01 RX ORDER — LOSARTAN POTASSIUM 50 MG/1
100 TABLET ORAL DAILY
Status: DISCONTINUED | OUTPATIENT
Start: 2019-01-01 | End: 2019-01-01 | Stop reason: HOSPADM

## 2019-01-01 RX ORDER — SODIUM CHLORIDE, SODIUM LACTATE, POTASSIUM CHLORIDE, CALCIUM CHLORIDE 600; 310; 30; 20 MG/100ML; MG/100ML; MG/100ML; MG/100ML
100 INJECTION, SOLUTION INTRAVENOUS CONTINUOUS
Status: DISCONTINUED | OUTPATIENT
Start: 2019-01-01 | End: 2019-01-01 | Stop reason: HOSPADM

## 2019-01-01 RX ORDER — SODIUM CHLORIDE 0.9 % (FLUSH) 0.9 %
5-40 SYRINGE (ML) INJECTION AS NEEDED
Status: CANCELLED | OUTPATIENT
Start: 2019-01-01

## 2019-01-01 RX ORDER — ROCURONIUM BROMIDE 10 MG/ML
INJECTION, SOLUTION INTRAVENOUS AS NEEDED
Status: DISCONTINUED | OUTPATIENT
Start: 2019-01-01 | End: 2019-01-01 | Stop reason: HOSPADM

## 2019-01-01 RX ORDER — FLUMAZENIL 0.1 MG/ML
0.2 INJECTION INTRAVENOUS
Status: DISCONTINUED | OUTPATIENT
Start: 2019-01-01 | End: 2019-01-01 | Stop reason: HOSPADM

## 2019-01-01 RX ORDER — TRAMADOL HYDROCHLORIDE 50 MG/1
50 TABLET ORAL
COMMUNITY
End: 2019-01-01

## 2019-01-01 RX ORDER — SODIUM CHLORIDE 9 MG/ML
250 INJECTION, SOLUTION INTRAVENOUS AS NEEDED
Status: DISCONTINUED | OUTPATIENT
Start: 2019-01-01 | End: 2019-01-01 | Stop reason: HOSPADM

## 2019-01-01 RX ORDER — ACETAMINOPHEN 325 MG/1
650 TABLET ORAL
Status: DISCONTINUED | OUTPATIENT
Start: 2019-01-01 | End: 2019-01-01

## 2019-01-01 RX ORDER — MORPHINE SULFATE 2 MG/ML
1 INJECTION, SOLUTION INTRAMUSCULAR; INTRAVENOUS
Status: DISCONTINUED | OUTPATIENT
Start: 2019-01-01 | End: 2019-01-01

## 2019-01-01 RX ORDER — IPRATROPIUM BROMIDE AND ALBUTEROL SULFATE 2.5; .5 MG/3ML; MG/3ML
3 SOLUTION RESPIRATORY (INHALATION)
Status: DISCONTINUED | OUTPATIENT
Start: 2019-01-01 | End: 2019-01-01 | Stop reason: HOSPADM

## 2019-01-01 RX ORDER — ALBUTEROL SULFATE 0.83 MG/ML
2.5 SOLUTION RESPIRATORY (INHALATION)
Status: ACTIVE | OUTPATIENT
Start: 2019-01-01 | End: 2019-01-01

## 2019-01-01 RX ORDER — EPHEDRINE SULFATE 50 MG/ML
INJECTION, SOLUTION INTRAVENOUS AS NEEDED
Status: DISCONTINUED | OUTPATIENT
Start: 2019-01-01 | End: 2019-01-01 | Stop reason: HOSPADM

## 2019-01-01 RX ORDER — SIMETHICONE 80 MG
80 TABLET,CHEWABLE ORAL
Qty: 12 TAB | Refills: 0 | Status: SHIPPED | OUTPATIENT
Start: 2019-01-01

## 2019-01-01 RX ORDER — DOCUSATE SODIUM 100 MG/1
100 CAPSULE, LIQUID FILLED ORAL DAILY
Status: DISCONTINUED | OUTPATIENT
Start: 2019-01-01 | End: 2019-01-01 | Stop reason: HOSPADM

## 2019-01-01 RX ORDER — VANCOMYCIN 2 GRAM/500 ML IN 0.9 % SODIUM CHLORIDE INTRAVENOUS
2000 EVERY 12 HOURS
Status: DISCONTINUED | OUTPATIENT
Start: 2019-01-01 | End: 2019-01-01 | Stop reason: HOSPADM

## 2019-01-01 RX ORDER — WARFARIN 2.5 MG/1
2.5 TABLET ORAL
COMMUNITY

## 2019-01-01 RX ORDER — ALLOPURINOL 100 MG/1
100 TABLET ORAL DAILY
COMMUNITY

## 2019-01-01 RX ORDER — SAME BUTANEDISULFONATE/BETAINE 400-600 MG
250 POWDER IN PACKET (EA) ORAL 2 TIMES DAILY
Qty: 14 CAP | Refills: 0 | Status: SHIPPED | OUTPATIENT
Start: 2019-01-01 | End: 2019-01-01

## 2019-01-01 RX ORDER — LIDOCAINE HYDROCHLORIDE 20 MG/ML
INJECTION, SOLUTION EPIDURAL; INFILTRATION; INTRACAUDAL; PERINEURAL AS NEEDED
Status: DISCONTINUED | OUTPATIENT
Start: 2019-01-01 | End: 2019-01-01 | Stop reason: HOSPADM

## 2019-01-01 RX ORDER — LORAZEPAM 0.5 MG/1
0.5 TABLET ORAL
Status: DISCONTINUED | OUTPATIENT
Start: 2019-01-01 | End: 2019-01-01 | Stop reason: HOSPADM

## 2019-01-01 RX ORDER — MORPHINE SULFATE 4 MG/ML
4 INJECTION INTRAVENOUS
Status: COMPLETED | OUTPATIENT
Start: 2019-01-01 | End: 2019-01-01

## 2019-01-01 RX ORDER — SODIUM CHLORIDE 9 MG/ML
INJECTION, SOLUTION INTRAVENOUS
Status: DISCONTINUED | OUTPATIENT
Start: 2019-01-01 | End: 2019-01-01 | Stop reason: HOSPADM

## 2019-01-01 RX ORDER — GLYCOPYRROLATE 0.2 MG/ML
INJECTION INTRAMUSCULAR; INTRAVENOUS AS NEEDED
Status: DISCONTINUED | OUTPATIENT
Start: 2019-01-01 | End: 2019-01-01 | Stop reason: HOSPADM

## 2019-01-01 RX ORDER — IPRATROPIUM BROMIDE AND ALBUTEROL SULFATE 2.5; .5 MG/3ML; MG/3ML
3 SOLUTION RESPIRATORY (INHALATION) 4 TIMES DAILY
COMMUNITY

## 2019-01-01 RX ORDER — TRAMADOL HYDROCHLORIDE 50 MG/1
50 TABLET ORAL
Status: DISCONTINUED | OUTPATIENT
Start: 2019-01-01 | End: 2019-01-01

## 2019-01-01 RX ORDER — SIMETHICONE 80 MG
80 TABLET,CHEWABLE ORAL
Status: DISCONTINUED | OUTPATIENT
Start: 2019-01-01 | End: 2019-01-01 | Stop reason: HOSPADM

## 2019-01-01 RX ORDER — PREGABALIN 75 MG/1
300 CAPSULE ORAL 2 TIMES DAILY
Status: DISCONTINUED | OUTPATIENT
Start: 2019-01-01 | End: 2019-01-01 | Stop reason: HOSPADM

## 2019-01-01 RX ORDER — NEOSTIGMINE METHYLSULFATE 1 MG/ML
INJECTION INTRAVENOUS AS NEEDED
Status: DISCONTINUED | OUTPATIENT
Start: 2019-01-01 | End: 2019-01-01 | Stop reason: HOSPADM

## 2019-01-01 RX ORDER — LIDOCAINE HYDROCHLORIDE 20 MG/ML
JELLY TOPICAL ONCE
Status: DISPENSED | OUTPATIENT
Start: 2019-01-01 | End: 2019-01-01

## 2019-01-01 RX ORDER — FACIAL-BODY WIPES
10 EACH TOPICAL DAILY PRN
Status: DISCONTINUED | OUTPATIENT
Start: 2019-01-01 | End: 2019-01-01 | Stop reason: HOSPADM

## 2019-01-01 RX ORDER — LANOLIN ALCOHOL/MO/W.PET/CERES
1 CREAM (GRAM) TOPICAL 2 TIMES DAILY WITH MEALS
Status: DISCONTINUED | OUTPATIENT
Start: 2019-01-01 | End: 2019-01-01 | Stop reason: HOSPADM

## 2019-01-01 RX ORDER — NALOXONE HYDROCHLORIDE 0.4 MG/ML
0.4 INJECTION, SOLUTION INTRAMUSCULAR; INTRAVENOUS; SUBCUTANEOUS AS NEEDED
Status: DISCONTINUED | OUTPATIENT
Start: 2019-01-01 | End: 2019-01-01 | Stop reason: HOSPADM

## 2019-01-01 RX ORDER — LIDOCAINE HYDROCHLORIDE 10 MG/ML
0.1 INJECTION INFILTRATION; PERINEURAL AS NEEDED
Status: DISCONTINUED | OUTPATIENT
Start: 2019-01-01 | End: 2019-01-01 | Stop reason: HOSPADM

## 2019-01-01 RX ORDER — SAME BUTANEDISULFONATE/BETAINE 400-600 MG
250 POWDER IN PACKET (EA) ORAL 2 TIMES DAILY
Status: DISCONTINUED | OUTPATIENT
Start: 2019-01-01 | End: 2019-01-01 | Stop reason: HOSPADM

## 2019-01-01 RX ORDER — HYDROCODONE BITARTRATE AND ACETAMINOPHEN 5; 325 MG/1; MG/1
1 TABLET ORAL
Status: DISCONTINUED | OUTPATIENT
Start: 2019-01-01 | End: 2019-01-01

## 2019-01-01 RX ORDER — AMMONIUM LACTATE 12 G/100G
LOTION TOPICAL
COMMUNITY

## 2019-01-01 RX ORDER — ONDANSETRON 2 MG/ML
4 INJECTION INTRAMUSCULAR; INTRAVENOUS
Status: DISCONTINUED | OUTPATIENT
Start: 2019-01-01 | End: 2019-01-01 | Stop reason: HOSPADM

## 2019-01-01 RX ORDER — SUCCINYLCHOLINE CHLORIDE 20 MG/ML
INJECTION INTRAMUSCULAR; INTRAVENOUS AS NEEDED
Status: DISCONTINUED | OUTPATIENT
Start: 2019-01-01 | End: 2019-01-01 | Stop reason: HOSPADM

## 2019-01-01 RX ADMIN — INSULIN LISPRO 6 UNITS: 100 INJECTION, SOLUTION INTRAVENOUS; SUBCUTANEOUS at 17:04

## 2019-01-01 RX ADMIN — WARFARIN SODIUM 2.5 MG: 2.5 TABLET ORAL at 17:22

## 2019-01-01 RX ADMIN — Medication 1 AMPULE: at 21:54

## 2019-01-01 RX ADMIN — INSULIN LISPRO 6 UNITS: 100 INJECTION, SOLUTION INTRAVENOUS; SUBCUTANEOUS at 11:31

## 2019-01-01 RX ADMIN — POLYETHYLENE GLYCOL 3350 17 G: 17 POWDER, FOR SOLUTION ORAL at 08:55

## 2019-01-01 RX ADMIN — SUCCINYLCHOLINE CHLORIDE 140 MG: 20 INJECTION INTRAMUSCULAR; INTRAVENOUS at 15:04

## 2019-01-01 RX ADMIN — VANCOMYCIN HYDROCHLORIDE 2000 MG: 10 INJECTION, POWDER, LYOPHILIZED, FOR SOLUTION INTRAVENOUS at 14:53

## 2019-01-01 RX ADMIN — OXYCODONE HYDROCHLORIDE 10 MG: 5 TABLET ORAL at 12:10

## 2019-01-01 RX ADMIN — SODIUM CHLORIDE 75 ML/HR: 900 INJECTION, SOLUTION INTRAVENOUS at 23:05

## 2019-01-01 RX ADMIN — INSULIN LISPRO 6 UNITS: 100 INJECTION, SOLUTION INTRAVENOUS; SUBCUTANEOUS at 08:13

## 2019-01-01 RX ADMIN — POLYETHYLENE GLYCOL 3350 17 G: 17 POWDER, FOR SOLUTION ORAL at 09:42

## 2019-01-01 RX ADMIN — HYDROCODONE BITARTRATE AND ACETAMINOPHEN 1 TABLET: 5; 325 TABLET ORAL at 03:57

## 2019-01-01 RX ADMIN — TRAMADOL HYDROCHLORIDE 50 MG: 50 TABLET, FILM COATED ORAL at 17:04

## 2019-01-01 RX ADMIN — OXYCODONE HYDROCHLORIDE 10 MG: 5 TABLET ORAL at 12:40

## 2019-01-01 RX ADMIN — ALBUTEROL SULFATE 2.5 MG: 2.5 SOLUTION RESPIRATORY (INHALATION) at 02:17

## 2019-01-01 RX ADMIN — FUROSEMIDE 40 MG: 10 INJECTION, SOLUTION INTRAMUSCULAR; INTRAVENOUS at 13:49

## 2019-01-01 RX ADMIN — METOPROLOL TARTRATE 12.5 MG: 25 TABLET, FILM COATED ORAL at 17:22

## 2019-01-01 RX ADMIN — INSULIN LISPRO 4 UNITS: 100 INJECTION, SOLUTION INTRAVENOUS; SUBCUTANEOUS at 12:14

## 2019-01-01 RX ADMIN — ALBUTEROL SULFATE 2.5 MG: 2.5 SOLUTION RESPIRATORY (INHALATION) at 19:00

## 2019-01-01 RX ADMIN — OXYCODONE HYDROCHLORIDE 5 MG: 5 TABLET ORAL at 09:07

## 2019-01-01 RX ADMIN — INSULIN LISPRO 2 UNITS: 100 INJECTION, SOLUTION INTRAVENOUS; SUBCUTANEOUS at 08:02

## 2019-01-01 RX ADMIN — TRAMADOL HYDROCHLORIDE 50 MG: 50 TABLET, FILM COATED ORAL at 00:57

## 2019-01-01 RX ADMIN — DOCUSATE SODIUM 100 MG: 100 CAPSULE ORAL at 08:20

## 2019-01-01 RX ADMIN — FENTANYL CITRATE 25 MCG: 50 INJECTION INTRAMUSCULAR; INTRAVENOUS at 13:26

## 2019-01-01 RX ADMIN — TRAMADOL HYDROCHLORIDE 50 MG: 50 TABLET, FILM COATED ORAL at 08:20

## 2019-01-01 RX ADMIN — Medication 1 AMPULE: at 08:20

## 2019-01-01 RX ADMIN — SODIUM CHLORIDE 75 ML/HR: 900 INJECTION, SOLUTION INTRAVENOUS at 09:42

## 2019-01-01 RX ADMIN — TRAMADOL HYDROCHLORIDE 50 MG: 50 TABLET, FILM COATED ORAL at 04:23

## 2019-01-01 RX ADMIN — FERROUS SULFATE TAB 325 MG (65 MG ELEMENTAL FE) 325 MG: 325 (65 FE) TAB at 08:13

## 2019-01-01 RX ADMIN — Medication 1 AMPULE: at 08:55

## 2019-01-01 RX ADMIN — INSULIN LISPRO 8 UNITS: 100 INJECTION, SOLUTION INTRAVENOUS; SUBCUTANEOUS at 11:53

## 2019-01-01 RX ADMIN — INSULIN LISPRO 2 UNITS: 100 INJECTION, SOLUTION INTRAVENOUS; SUBCUTANEOUS at 22:11

## 2019-01-01 RX ADMIN — BENZOCAINE AND MENTHOL 1 LOZENGE: 15; 2.6 LOZENGE ORAL at 12:41

## 2019-01-01 RX ADMIN — INSULIN LISPRO 6 UNITS: 100 INJECTION, SOLUTION INTRAVENOUS; SUBCUTANEOUS at 16:53

## 2019-01-01 RX ADMIN — ASPIRIN 81 MG 81 MG: 81 TABLET ORAL at 09:06

## 2019-01-01 RX ADMIN — ONDANSETRON 4 MG: 2 INJECTION INTRAMUSCULAR; INTRAVENOUS at 12:24

## 2019-01-01 RX ADMIN — TRAMADOL HYDROCHLORIDE 50 MG: 50 TABLET, FILM COATED ORAL at 09:42

## 2019-01-01 RX ADMIN — DULOXETINE HYDROCHLORIDE 60 MG: 60 CAPSULE, DELAYED RELEASE ORAL at 08:01

## 2019-01-01 RX ADMIN — TRAMADOL HYDROCHLORIDE 50 MG: 50 TABLET, FILM COATED ORAL at 23:25

## 2019-01-01 RX ADMIN — FUROSEMIDE 40 MG: 10 INJECTION, SOLUTION INTRAMUSCULAR; INTRAVENOUS at 08:01

## 2019-01-01 RX ADMIN — EPHEDRINE SULFATE 10 MG: 50 INJECTION, SOLUTION INTRAVENOUS at 15:08

## 2019-01-01 RX ADMIN — MORPHINE SULFATE 4 MG: 4 INJECTION INTRAVENOUS at 07:29

## 2019-01-01 RX ADMIN — ALBUTEROL SULFATE 2.5 MG: 2.5 SOLUTION RESPIRATORY (INHALATION) at 07:28

## 2019-01-01 RX ADMIN — Medication 1 AMPULE: at 20:14

## 2019-01-01 RX ADMIN — INSULIN LISPRO 8 UNITS: 100 INJECTION, SOLUTION INTRAVENOUS; SUBCUTANEOUS at 07:55

## 2019-01-01 RX ADMIN — METAXALONE 400 MG: 800 TABLET ORAL at 11:44

## 2019-01-01 RX ADMIN — MORPHINE SULFATE 1 MG: 2 INJECTION, SOLUTION INTRAMUSCULAR; INTRAVENOUS at 01:01

## 2019-01-01 RX ADMIN — ATORVASTATIN CALCIUM 40 MG: 40 TABLET, FILM COATED ORAL at 08:55

## 2019-01-01 RX ADMIN — FUROSEMIDE 40 MG: 10 INJECTION, SOLUTION INTRAMUSCULAR; INTRAVENOUS at 08:21

## 2019-01-01 RX ADMIN — INSULIN LISPRO 6 UNITS: 100 INJECTION, SOLUTION INTRAVENOUS; SUBCUTANEOUS at 08:55

## 2019-01-01 RX ADMIN — MORPHINE SULFATE 1 MG: 2 INJECTION, SOLUTION INTRAMUSCULAR; INTRAVENOUS at 10:28

## 2019-01-01 RX ADMIN — Medication 1 AMPULE: at 22:12

## 2019-01-01 RX ADMIN — LIDOCAINE HYDROCHLORIDE 60 MG: 20 INJECTION, SOLUTION EPIDURAL; INFILTRATION; INTRACAUDAL; PERINEURAL at 15:04

## 2019-01-01 RX ADMIN — ROCURONIUM BROMIDE 10 MG: 10 INJECTION, SOLUTION INTRAVENOUS at 15:04

## 2019-01-01 RX ADMIN — Medication 1 AMPULE: at 08:14

## 2019-01-01 RX ADMIN — INSULIN HUMAN 4 UNITS: 100 INJECTION, SOLUTION PARENTERAL at 13:03

## 2019-01-01 RX ADMIN — SODIUM CHLORIDE 75 ML/HR: 900 INJECTION, SOLUTION INTRAVENOUS at 21:57

## 2019-01-01 RX ADMIN — Medication 1 AMPULE: at 09:42

## 2019-01-01 RX ADMIN — HYDROCODONE BITARTRATE AND ACETAMINOPHEN 1 TABLET: 5; 325 TABLET ORAL at 09:13

## 2019-01-01 RX ADMIN — ALBUTEROL SULFATE 2.5 MG: 2.5 SOLUTION RESPIRATORY (INHALATION) at 19:21

## 2019-01-01 RX ADMIN — FERROUS SULFATE TAB 325 MG (65 MG ELEMENTAL FE) 325 MG: 325 (65 FE) TAB at 16:58

## 2019-01-01 RX ADMIN — MORPHINE SULFATE 1 MG: 2 INJECTION, SOLUTION INTRAMUSCULAR; INTRAVENOUS at 21:06

## 2019-01-01 RX ADMIN — DOCUSATE SODIUM 100 MG: 100 CAPSULE, LIQUID FILLED ORAL at 07:41

## 2019-01-01 RX ADMIN — INSULIN GLARGINE 15 UNITS: 100 INJECTION, SOLUTION SUBCUTANEOUS at 09:03

## 2019-01-01 RX ADMIN — INSULIN LISPRO 6 UNITS: 100 INJECTION, SOLUTION INTRAVENOUS; SUBCUTANEOUS at 08:22

## 2019-01-01 RX ADMIN — SODIUM CHLORIDE 75 ML/HR: 900 INJECTION, SOLUTION INTRAVENOUS at 00:30

## 2019-01-01 RX ADMIN — TRAMADOL HYDROCHLORIDE 50 MG: 50 TABLET, FILM COATED ORAL at 11:44

## 2019-01-01 RX ADMIN — Medication 1 AMPULE: at 09:05

## 2019-01-01 RX ADMIN — SIMETHICONE CHEW TAB 80 MG 80 MG: 80 TABLET ORAL at 09:39

## 2019-01-01 RX ADMIN — PREGABALIN 300 MG: 75 CAPSULE ORAL at 16:51

## 2019-01-01 RX ADMIN — ETOMIDATE 20 MG: 2 INJECTION INTRAVENOUS at 15:04

## 2019-01-01 RX ADMIN — CEFTRIAXONE 1 G: 1 INJECTION, POWDER, FOR SOLUTION INTRAMUSCULAR; INTRAVENOUS at 15:57

## 2019-01-01 RX ADMIN — Medication 1 AMPULE: at 21:57

## 2019-01-01 RX ADMIN — HYDROCODONE BITARTRATE AND ACETAMINOPHEN 1 TABLET: 5; 325 TABLET ORAL at 03:55

## 2019-01-01 RX ADMIN — TUBERCULIN PURIFIED PROTEIN DERIVATIVE 5 UNITS: 5 INJECTION, SOLUTION INTRADERMAL at 17:21

## 2019-01-01 RX ADMIN — ALBUTEROL SULFATE 2.5 MG: 2.5 SOLUTION RESPIRATORY (INHALATION) at 19:19

## 2019-01-01 RX ADMIN — OXYCODONE HYDROCHLORIDE 10 MG: 5 TABLET ORAL at 17:00

## 2019-01-01 RX ADMIN — HYDROCODONE BITARTRATE AND ACETAMINOPHEN 1 TABLET: 5; 325 TABLET ORAL at 21:37

## 2019-01-01 RX ADMIN — ALBUTEROL SULFATE 2.5 MG: 2.5 SOLUTION RESPIRATORY (INHALATION) at 02:33

## 2019-01-01 RX ADMIN — INSULIN GLARGINE 15 UNITS: 100 INJECTION, SOLUTION SUBCUTANEOUS at 08:02

## 2019-01-01 RX ADMIN — DOCUSATE SODIUM 100 MG: 100 CAPSULE, LIQUID FILLED ORAL at 16:57

## 2019-01-01 RX ADMIN — SODIUM CHLORIDE, SODIUM LACTATE, POTASSIUM CHLORIDE, AND CALCIUM CHLORIDE 100 ML/HR: 600; 310; 30; 20 INJECTION, SOLUTION INTRAVENOUS at 12:00

## 2019-01-01 RX ADMIN — Medication 1 AMPULE: at 21:45

## 2019-01-01 RX ADMIN — ACETAMINOPHEN 650 MG: 325 TABLET, FILM COATED ORAL at 17:04

## 2019-01-01 RX ADMIN — DULOXETINE HYDROCHLORIDE 60 MG: 60 CAPSULE, DELAYED RELEASE ORAL at 08:55

## 2019-01-01 RX ADMIN — VANCOMYCIN HYDROCHLORIDE 2000 MG: 10 INJECTION, POWDER, LYOPHILIZED, FOR SOLUTION INTRAVENOUS at 03:55

## 2019-01-01 RX ADMIN — Medication 250 MG: at 16:42

## 2019-01-01 RX ADMIN — ALBUTEROL SULFATE 2.5 MG: 2.5 SOLUTION RESPIRATORY (INHALATION) at 07:23

## 2019-01-01 RX ADMIN — SODIUM CHLORIDE 75 ML/HR: 900 INJECTION, SOLUTION INTRAVENOUS at 15:55

## 2019-01-01 RX ADMIN — FAMOTIDINE 20 MG: 10 INJECTION, SOLUTION INTRAVENOUS at 13:02

## 2019-01-01 RX ADMIN — ASPIRIN 81 MG 324 MG: 81 TABLET ORAL at 07:29

## 2019-01-01 RX ADMIN — HYDROCODONE BITARTRATE AND ACETAMINOPHEN 1 TABLET: 5; 325 TABLET ORAL at 14:47

## 2019-01-01 RX ADMIN — INSULIN LISPRO 6 UNITS: 100 INJECTION, SOLUTION INTRAVENOUS; SUBCUTANEOUS at 11:22

## 2019-01-01 RX ADMIN — INSULIN LISPRO 2 UNITS: 100 INJECTION, SOLUTION INTRAVENOUS; SUBCUTANEOUS at 17:29

## 2019-01-01 RX ADMIN — POLYETHYLENE GLYCOL 3350 17 G: 17 POWDER, FOR SOLUTION ORAL at 08:21

## 2019-01-01 RX ADMIN — PREGABALIN 300 MG: 75 CAPSULE ORAL at 08:21

## 2019-01-01 RX ADMIN — Medication 1 AMPULE: at 09:00

## 2019-01-01 RX ADMIN — Medication 10 ML: at 23:15

## 2019-01-01 RX ADMIN — INSULIN LISPRO 6 UNITS: 100 INJECTION, SOLUTION INTRAVENOUS; SUBCUTANEOUS at 23:11

## 2019-01-01 RX ADMIN — WARFARIN SODIUM 5 MG: 5 TABLET ORAL at 17:20

## 2019-01-01 RX ADMIN — TRAMADOL HYDROCHLORIDE 50 MG: 50 TABLET, FILM COATED ORAL at 12:20

## 2019-01-01 RX ADMIN — INSULIN LISPRO 4 UNITS: 100 INJECTION, SOLUTION INTRAVENOUS; SUBCUTANEOUS at 11:45

## 2019-01-01 RX ADMIN — WARFARIN SODIUM 5 MG: 5 TABLET ORAL at 18:38

## 2019-01-01 RX ADMIN — EPHEDRINE SULFATE 10 MG: 50 INJECTION, SOLUTION INTRAVENOUS at 15:37

## 2019-01-01 RX ADMIN — DOCUSATE SODIUM 100 MG: 100 CAPSULE ORAL at 14:54

## 2019-01-01 RX ADMIN — ATORVASTATIN CALCIUM 40 MG: 40 TABLET, FILM COATED ORAL at 09:42

## 2019-01-01 RX ADMIN — INSULIN LISPRO 6 UNITS: 100 INJECTION, SOLUTION INTRAVENOUS; SUBCUTANEOUS at 22:02

## 2019-01-01 RX ADMIN — SODIUM CHLORIDE 75 ML/HR: 900 INJECTION, SOLUTION INTRAVENOUS at 09:03

## 2019-01-01 RX ADMIN — GLYCOPYRROLATE 0.4 MG: 0.2 INJECTION INTRAMUSCULAR; INTRAVENOUS at 17:53

## 2019-01-01 RX ADMIN — INSULIN GLARGINE 15 UNITS: 100 INJECTION, SOLUTION SUBCUTANEOUS at 08:22

## 2019-01-01 RX ADMIN — SODIUM CHLORIDE: 9 INJECTION, SOLUTION INTRAVENOUS at 16:20

## 2019-01-01 RX ADMIN — LOSARTAN POTASSIUM 100 MG: 50 TABLET ORAL at 08:21

## 2019-01-01 RX ADMIN — METAXALONE 400 MG: 800 TABLET ORAL at 11:55

## 2019-01-01 RX ADMIN — IPRATROPIUM BROMIDE AND ALBUTEROL SULFATE 3 ML: .5; 3 SOLUTION RESPIRATORY (INHALATION) at 14:56

## 2019-01-01 RX ADMIN — NEOSTIGMINE METHYLSULFATE 3 MG: 1 INJECTION INTRAVENOUS at 17:53

## 2019-01-01 RX ADMIN — VANCOMYCIN HYDROCHLORIDE 2000 MG: 10 INJECTION, POWDER, LYOPHILIZED, FOR SOLUTION INTRAVENOUS at 14:00

## 2019-01-01 RX ADMIN — ONDANSETRON 4 MG: 2 INJECTION INTRAMUSCULAR; INTRAVENOUS at 17:42

## 2019-01-01 RX ADMIN — DOCUSATE SODIUM 100 MG: 100 CAPSULE, LIQUID FILLED ORAL at 09:06

## 2019-01-01 RX ADMIN — INSULIN LISPRO 8 UNITS: 100 INJECTION, SOLUTION INTRAVENOUS; SUBCUTANEOUS at 21:44

## 2019-01-01 RX ADMIN — HYDROCODONE BITARTRATE AND ACETAMINOPHEN 1 TABLET: 5; 325 TABLET ORAL at 19:42

## 2019-01-01 RX ADMIN — TUBERCULIN PURIFIED PROTEIN DERIVATIVE 5 UNITS: 5 INJECTION, SOLUTION INTRADERMAL at 14:53

## 2019-01-01 RX ADMIN — ROCURONIUM BROMIDE 40 MG: 10 INJECTION, SOLUTION INTRAVENOUS at 15:20

## 2019-01-01 RX ADMIN — ALBUTEROL SULFATE 2.5 MG: 2.5 SOLUTION RESPIRATORY (INHALATION) at 08:07

## 2019-01-01 RX ADMIN — ACETAMINOPHEN 650 MG: 325 TABLET, FILM COATED ORAL at 11:53

## 2019-01-01 RX ADMIN — Medication 1 AMPULE: at 22:19

## 2019-01-01 RX ADMIN — OXYCODONE HYDROCHLORIDE 10 MG: 5 TABLET ORAL at 16:00

## 2019-01-01 RX ADMIN — ALBUTEROL SULFATE 2.5 MG: 2.5 SOLUTION RESPIRATORY (INHALATION) at 07:14

## 2019-01-01 RX ADMIN — MAGNESIUM SULFATE HEPTAHYDRATE 2 G: 40 INJECTION, SOLUTION INTRAVENOUS at 07:43

## 2019-01-01 RX ADMIN — ONDANSETRON 4 MG: 2 INJECTION INTRAMUSCULAR; INTRAVENOUS at 07:29

## 2019-01-01 RX ADMIN — ALBUTEROL SULFATE 2.5 MG: 2.5 SOLUTION RESPIRATORY (INHALATION) at 13:32

## 2019-01-01 RX ADMIN — INSULIN LISPRO 4 UNITS: 100 INJECTION, SOLUTION INTRAVENOUS; SUBCUTANEOUS at 08:22

## 2019-01-01 RX ADMIN — WARFARIN SODIUM 2.5 MG: 2.5 TABLET ORAL at 18:16

## 2019-01-01 RX ADMIN — ALBUTEROL SULFATE 2.5 MG: 2.5 SOLUTION RESPIRATORY (INHALATION) at 08:18

## 2019-01-01 RX ADMIN — ALBUTEROL SULFATE 2.5 MG: 2.5 SOLUTION RESPIRATORY (INHALATION) at 13:42

## 2019-01-01 RX ADMIN — MORPHINE SULFATE 1 MG: 2 INJECTION, SOLUTION INTRAMUSCULAR; INTRAVENOUS at 20:14

## 2019-01-01 RX ADMIN — METOPROLOL TARTRATE 12.5 MG: 25 TABLET, FILM COATED ORAL at 09:42

## 2019-01-01 RX ADMIN — MORPHINE SULFATE 1 MG: 2 INJECTION, SOLUTION INTRAMUSCULAR; INTRAVENOUS at 13:59

## 2019-01-01 RX ADMIN — ACETAMINOPHEN 650 MG: 325 TABLET, FILM COATED ORAL at 16:43

## 2019-01-01 RX ADMIN — METOPROLOL TARTRATE 12.5 MG: 25 TABLET, FILM COATED ORAL at 17:04

## 2019-01-01 RX ADMIN — POLYETHYLENE GLYCOL 3350 17 G: 17 POWDER, FOR SOLUTION ORAL at 08:07

## 2019-01-01 RX ADMIN — OXYCODONE HYDROCHLORIDE 10 MG: 5 TABLET ORAL at 19:49

## 2019-01-01 RX ADMIN — INSULIN LISPRO 6 UNITS: 100 INJECTION, SOLUTION INTRAVENOUS; SUBCUTANEOUS at 11:58

## 2019-01-01 RX ADMIN — INSULIN LISPRO 8 UNITS: 100 INJECTION, SOLUTION INTRAVENOUS; SUBCUTANEOUS at 21:07

## 2019-01-01 RX ADMIN — OXYCODONE HYDROCHLORIDE 10 MG: 5 TABLET ORAL at 07:41

## 2019-01-01 RX ADMIN — SIMETHICONE CHEW TAB 80 MG 80 MG: 80 TABLET ORAL at 14:54

## 2019-01-01 RX ADMIN — INSULIN LISPRO 4 UNITS: 100 INJECTION, SOLUTION INTRAVENOUS; SUBCUTANEOUS at 13:03

## 2019-01-01 RX ADMIN — DOCUSATE SODIUM 100 MG: 100 CAPSULE, LIQUID FILLED ORAL at 08:13

## 2019-01-01 RX ADMIN — Medication 1 AMPULE: at 21:00

## 2019-01-01 RX ADMIN — ACETAMINOPHEN 650 MG: 325 TABLET, FILM COATED ORAL at 08:02

## 2019-01-01 RX ADMIN — INSULIN LISPRO 8 UNITS: 100 INJECTION, SOLUTION INTRAVENOUS; SUBCUTANEOUS at 18:38

## 2019-01-01 RX ADMIN — INSULIN LISPRO 4 UNITS: 100 INJECTION, SOLUTION INTRAVENOUS; SUBCUTANEOUS at 20:10

## 2019-01-01 RX ADMIN — ATORVASTATIN CALCIUM 40 MG: 40 TABLET, FILM COATED ORAL at 21:57

## 2019-01-01 RX ADMIN — METAXALONE 400 MG: 800 TABLET ORAL at 13:33

## 2019-01-01 RX ADMIN — METOPROLOL TARTRATE 12.5 MG: 25 TABLET, FILM COATED ORAL at 08:20

## 2019-01-01 RX ADMIN — INSULIN LISPRO 4 UNITS: 100 INJECTION, SOLUTION INTRAVENOUS; SUBCUTANEOUS at 17:21

## 2019-01-01 RX ADMIN — DULOXETINE HYDROCHLORIDE 60 MG: 60 CAPSULE, DELAYED RELEASE ORAL at 08:21

## 2019-01-01 RX ADMIN — INSULIN LISPRO 4 UNITS: 100 INJECTION, SOLUTION INTRAVENOUS; SUBCUTANEOUS at 12:21

## 2019-01-01 RX ADMIN — METAXALONE 400 MG: 800 TABLET ORAL at 11:12

## 2019-01-01 RX ADMIN — INSULIN LISPRO 6 UNITS: 100 INJECTION, SOLUTION INTRAVENOUS; SUBCUTANEOUS at 16:42

## 2019-01-01 RX ADMIN — FERROUS SULFATE TAB 325 MG (65 MG ELEMENTAL FE) 325 MG: 325 (65 FE) TAB at 09:06

## 2019-01-01 RX ADMIN — Medication 250 MG: at 08:19

## 2019-01-01 RX ADMIN — Medication 1 AMPULE: at 08:01

## 2019-01-01 RX ADMIN — WARFARIN SODIUM 2.5 MG: 2.5 TABLET ORAL at 16:44

## 2019-01-01 RX ADMIN — BUMETANIDE 1 MG: 1 TABLET ORAL at 09:06

## 2019-01-01 RX ADMIN — ATORVASTATIN CALCIUM 40 MG: 40 TABLET, FILM COATED ORAL at 08:20

## 2019-01-01 RX ADMIN — METAXALONE 400 MG: 800 TABLET ORAL at 21:54

## 2019-01-01 RX ADMIN — DOCUSATE SODIUM 100 MG: 100 CAPSULE, LIQUID FILLED ORAL at 18:32

## 2019-01-01 RX ADMIN — EPHEDRINE SULFATE 10 MG: 50 INJECTION, SOLUTION INTRAVENOUS at 15:30

## 2019-01-01 RX ADMIN — ASPIRIN 81 MG 81 MG: 81 TABLET ORAL at 07:41

## 2019-01-01 RX ADMIN — MAGNESIUM SULFATE IN WATER 2 G: 40 INJECTION, SOLUTION INTRAVENOUS at 02:46

## 2019-01-01 RX ADMIN — INSULIN LISPRO 4 UNITS: 100 INJECTION, SOLUTION INTRAVENOUS; SUBCUTANEOUS at 22:18

## 2019-01-01 RX ADMIN — METOPROLOL TARTRATE 12.5 MG: 25 TABLET, FILM COATED ORAL at 17:17

## 2019-01-01 RX ADMIN — WARFARIN SODIUM 5 MG: 5 TABLET ORAL at 17:26

## 2019-01-01 RX ADMIN — MORPHINE SULFATE 4 MG: 4 INJECTION INTRAVENOUS at 12:25

## 2019-01-01 RX ADMIN — EPHEDRINE SULFATE 20 MG: 50 INJECTION, SOLUTION INTRAVENOUS at 15:23

## 2019-01-01 RX ADMIN — ACETAMINOPHEN 650 MG: 325 TABLET, FILM COATED ORAL at 09:39

## 2019-01-01 RX ADMIN — OXYCODONE HYDROCHLORIDE 5 MG: 5 TABLET ORAL at 08:13

## 2019-01-01 RX ADMIN — INSULIN LISPRO 4 UNITS: 100 INJECTION, SOLUTION INTRAVENOUS; SUBCUTANEOUS at 22:25

## 2019-01-01 RX ADMIN — TRAMADOL HYDROCHLORIDE 50 MG: 50 TABLET, FILM COATED ORAL at 18:16

## 2019-01-01 RX ADMIN — NITROGLYCERIN 1 INCH: 20 OINTMENT TOPICAL at 07:29

## 2019-01-01 RX ADMIN — SODIUM CHLORIDE 250 ML: 900 INJECTION, SOLUTION INTRAVENOUS at 23:05

## 2019-01-01 RX ADMIN — METAXALONE 400 MG: 800 TABLET ORAL at 05:52

## 2019-01-01 RX ADMIN — OXYCODONE HYDROCHLORIDE 10 MG: 5 TABLET ORAL at 04:29

## 2019-01-01 RX ADMIN — CEFTRIAXONE 1 G: 1 INJECTION, POWDER, FOR SOLUTION INTRAMUSCULAR; INTRAVENOUS at 14:53

## 2019-01-01 RX ADMIN — INSULIN GLARGINE 15 UNITS: 100 INJECTION, SOLUTION SUBCUTANEOUS at 07:57

## 2019-01-01 RX ADMIN — METOPROLOL TARTRATE 12.5 MG: 25 TABLET, FILM COATED ORAL at 08:55

## 2019-01-01 RX ADMIN — ATORVASTATIN CALCIUM 40 MG: 40 TABLET, FILM COATED ORAL at 08:02

## 2019-01-01 RX ADMIN — Medication 1 AMPULE: at 09:02

## 2019-01-01 RX ADMIN — WARFARIN SODIUM 2.5 MG: 2.5 TABLET ORAL at 16:58

## 2019-01-01 RX ADMIN — EPHEDRINE SULFATE 10 MG: 50 INJECTION, SOLUTION INTRAVENOUS at 15:03

## 2019-01-01 RX ADMIN — METAXALONE 400 MG: 800 TABLET ORAL at 20:51

## 2019-01-01 RX ADMIN — ALBUTEROL SULFATE 2.5 MG: 2.5 SOLUTION RESPIRATORY (INHALATION) at 02:05

## 2019-01-01 RX ADMIN — ALBUTEROL SULFATE 2.5 MG: 2.5 SOLUTION RESPIRATORY (INHALATION) at 21:04

## 2019-01-01 RX ADMIN — DULOXETINE HYDROCHLORIDE 60 MG: 60 CAPSULE, DELAYED RELEASE ORAL at 09:42

## 2019-01-01 RX ADMIN — VANCOMYCIN HYDROCHLORIDE 2000 MG: 10 INJECTION, POWDER, LYOPHILIZED, FOR SOLUTION INTRAVENOUS at 15:44

## 2019-01-01 RX ADMIN — INSULIN LISPRO 8 UNITS: 100 INJECTION, SOLUTION INTRAVENOUS; SUBCUTANEOUS at 17:15

## 2019-01-01 RX ADMIN — ACETAMINOPHEN 650 MG: 325 TABLET, FILM COATED ORAL at 05:52

## 2019-01-01 RX ADMIN — HUMAN INSULIN 4 UNITS: 100 INJECTION, SOLUTION SUBCUTANEOUS at 14:00

## 2019-01-01 RX ADMIN — BUMETANIDE 1 MG: 1 TABLET ORAL at 11:00

## 2019-01-01 RX ADMIN — ATORVASTATIN CALCIUM 40 MG: 40 TABLET, FILM COATED ORAL at 19:49

## 2019-01-30 PROBLEM — I48.19 PERSISTENT ATRIAL FIBRILLATION (HCC): Status: ACTIVE | Noted: 2019-01-01

## 2019-02-08 PROBLEM — I48.19 PERSISTENT ATRIAL FIBRILLATION (HCC): Chronic | Status: ACTIVE | Noted: 2019-01-01

## 2019-02-08 PROBLEM — E11.21 TYPE 2 DIABETES MELLITUS WITH NEPHROPATHY (HCC): Chronic | Status: ACTIVE | Noted: 2017-12-26

## 2019-02-08 PROBLEM — Z79.01 LONG TERM (CURRENT) USE OF ANTICOAGULANTS: Chronic | Status: ACTIVE | Noted: 2018-04-27

## 2019-06-22 NOTE — ED NOTES
md rodrigo guajardo talked with pt and wife about discharge plan both sts they understand and have no questions. Pt sts he feeling better still have some back pain; but better

## 2019-06-22 NOTE — ED NOTES
I have reviewed discharge instructions with the patient. The patient verbalized understanding. Patient left ED via Discharge Method: wheelchair to Home withwife Opportunity for questions and clarification provided. Patient given 2 scripts. To continue your aftercare when you leave the hospital, you may receive an automated call from our care team to check in on how you are doing. This is a free service and part of our promise to provide the best care and service to meet your aftercare needs.  If you have questions, or wish to unsubscribe from this service please call [phone]. Thank you for Choosing our [facility] Department.

## 2019-06-22 NOTE — ED NOTES
Monitor shows a short run of viry-tact md inform. Pt is awake alert  Skin warm dry, sts feeling fine. 2nd set of labs done as order

## 2019-06-22 NOTE — ED PROVIDER NOTES
64-year-old white male, history of coronary artery disease, status post bypass approximately 15 years ago stumbled and fell backward, 5 days ago, landing on his back. Since then he has had mid to upper back pain. During the night last night he developed chest discomfort described as a squeezing pain. He states this pain feels different than his previous cardiac chest pain. Has also noted some shortness of breath with activity. No cough or fever. Back pain is aggravated by movement. No aggravating or alleviating factors for his chest pain. The history is provided by the patient. Chest Pain Associated symptoms include back pain and shortness of breath. Pertinent negatives include no abdominal pain, no cough, no fever, no headaches, no nausea and no vomiting. Past Medical History:  
Diagnosis Date  Atrial flutter ([address] 2/3/2016  CAD (coronary artery disease)  Cardiomyopathy ([address]  Chronic systolic heart failure ([address] 10/20/2010  CKD (chronic kidney disease) stage 3, GFR 30-59 ml/min ([facility]) 10/18/2010  Diabetic peripheral neuropathy ([address] 9/15/2015  Dyslipidemia 10/18/2010  Edema  Essential hypertension, benign 9/15/2015  Insomnia 9/15/2015  
 sleep apnea - uses CPAP  
 Mixed hyperlipidemia 9/15/2015  Morbid obesity ([address] 10/18/2010  Nephrolithiasis  Peripheral neuropathy  Renal insufficiency  Right heart failure ([facility])  S/P CABG (coronary artery bypass graft)  Type II or unspecified type diabetes mellitus without mention of complication, uncontrolled ([address] 9/15/2015 Past Surgical History:  
Procedure Laterality Date  CARDIAC SURG PROCEDURE UNLIST By-pass x 5  
 HX CATARACT REMOVAL    
 HX CORONARY STENT PLACEMENT Right   
 femoral artery stent  HX UROLOGICAL Kidney stone removal  
 
   
Family History:  
Problem Relation Age of Onset  No Known Problems Mother  Coronary Artery Disease Father  No Known Problems Sister Social History Socioeconomic History  Marital status:  Spouse name: Not on file  Number of children: Not on file  Years of education: Not on file  Highest education level: Not on file Occupational History  Not on file Social Needs  Financial resource strain: Not on file  Food insecurity:  
  Worry: Not on file Inability: Not on file  Transportation needs:  
  Medical: Not on file Non-medical: Not on file Tobacco Use  Smoking status: Former Smoker Last attempt to quit: 1986 Years since quittin.4  Smokeless tobacco: Never Used Substance and Sexual Activity  Alcohol use: No  
  Comment: quit  Drug use: No  
 Sexual activity: Never Lifestyle  Physical activity:  
  Days per week: Not on file Minutes per session: Not on file  Stress: Not on file Relationships  Social connections:  
  Talks on phone: Not on file Gets together: Not on file Attends Temple service: Not on file Active member of club or organization: Not on file Attends meetings of clubs or organizations: Not on file Relationship status: Not on file  Intimate partner violence:  
  Fear of current or ex partner: Not on file Emotionally abused: Not on file Physically abused: Not on file Forced sexual activity: Not on file Other Topics Concern  Not on file Social History Narrative  Not on file ALLERGIES: Patient has no known allergies. Review of Systems Constitutional: Negative for fever. HENT: Negative for congestion. Eyes: Negative for visual disturbance. Respiratory: Positive for shortness of breath. Negative for cough. Cardiovascular: Positive for chest pain and leg swelling. Gastrointestinal: Negative for abdominal pain, nausea and vomiting. Genitourinary: Negative for dysuria. Musculoskeletal: Positive for back pain. Negative for neck pain. Skin: Negative for rash. Neurological: Negative for headaches. Psychiatric/Behavioral: Negative for confusion. Vitals:  
 06/22/19 0710 06/22/19 [id number] 06/22/19 [id number] 06/22/19 0750 BP: 162/70  137/55 Pulse: 76  66 62 Resp: 22 Temp: 97.6 °F (36.4 °C) SpO2: 91% 92%  98% Weight: (!) 163.3 kg (360 lb) Height: 5' 11\" (1.803 m) Physical Exam  
Constitutional: He is oriented to person, place, and time. He appears well-developed and well-nourished. No distress. HENT:  
Head: Normocephalic and atraumatic. Mouth/Throat: Oropharynx is clear and moist.  
Eyes: Pupils are equal, round, and reactive to light. Conjunctivae are normal.  
Neck: Normal range of motion. Neck supple. Cardiovascular: Normal rate. An irregularly irregular rhythm present. Pulmonary/Chest: Effort normal and breath sounds normal. He has no wheezes. He has no rales. Abdominal: Soft. He exhibits no distension. There is no tenderness. Musculoskeletal: Normal range of motion. Tenderness to the back at the lower thoracic level. No bruising or swelling. Neurological: He is alert and oriented to person, place, and time. Skin: Skin is warm and dry. Psychiatric: He has a normal mood and affect. His behavior is normal.  
Nursing note and vitals reviewed. MDM Number of Diagnoses or Management Options Chest pain, unspecified type:  
Contusion of back, unspecified laterality, initial encounter:  
Iron deficiency anemia, unspecified iron deficiency anemia type:  
Diagnosis management comments: EKG shows A. Fib with occasional PVC. No diagnostic ST-T wave changes. Chest x-ray cardiomegaly without acute abnormality. No evidence of rib fracture or vertebral fracture. Blood reveals anemia with hemoglobin of 8.8 and mild renal insufficiency, creatinine 1.8, BUN 50. Patient treated with aspirin, nitroglycerin and morphine. He is currently feeling much better.  I discussed the anemia with the patient and his wife. They have noticed no darkening of his stool or blood in his stool. Wife tells me that primary care is aware of his anemia and has started him on iron. At this time, I do not feel that further emergent workup is needed for this. His primary care is following it. Patient is comfortable with discharge home. Prior to discharge, patient had a 30 sec run of wide-complex tachycardic rhythm. It is irregular and likely an aberrant atrial fib with RVR. This was asymptomatic. Findings were reviewed with cardiology and recommendation at this time is to start the patient on 12 and half milligrams of metoprolol twice a day and follow-up with cardiology. Patient is comfortable with this plan. Amount and/or Complexity of Data Reviewed Clinical lab tests: ordered and reviewed Tests in the radiology section of CPT®: ordered and reviewed Tests in the medicine section of CPT®: ordered and reviewed Independent visualization of images, tracings, or specimens: yes Risk of Complications, Morbidity, and/or Mortality Presenting problems: moderate Diagnostic procedures: moderate Management options: moderate Procedures

## 2019-06-22 NOTE — ED NOTES
Patient advises that he had a fall, without loss of consciousness, on 6/17/2019 and fire department came and assisted patient up from floor, however did not get transported for further examination. Patient advises since that time he has been having pain across upper back around his shoulder blades. Patient advises that fall was caused by tripping over his oxygen tubing. Patient did not arrive on oxygen and states he is suppose to be on 2 lpm at all times. Patient advises that also during the night he started with central chest pain. Patient advises that he also has been having increased shortness of breath with exertion. Patient with wrapped wound to left lower leg and foot, advises he is being treated for wound near ankle and has home health care. States CABG x 5.

## 2019-06-22 NOTE — DISCHARGE INSTRUCTIONS
Patient Education        Iron Deficiency Anemia: Care Instructions  Your Care Instructions    Anemia means that you do not have enough red blood cells. Red blood cells carry oxygen around your body. When you have anemia, it can make you pale, weak, and tired. Many things can cause anemia. The most common cause is loss of blood. This can happen if you have heavy menstrual periods. It can also happen if you have bleeding in your stomach or bowel. You can also get anemia if you don't have enough iron in your diet or if it's hard for your body to absorb iron. In some cases, pregnancy causes anemia. That's because a pregnant woman needs more iron. Your doctor may do more tests to find the cause of your anemia. If a disease or other health problem is causing it, your doctor will treat that problem. It's important to follow up with your doctor to make sure that your iron level returns to normal.  Follow-up care is a key part of your treatment and safety. Be sure to make and go to all appointments, and call your doctor if you are having problems. It's also a good idea to know your test results and keep a list of the medicines you take. How can you care for yourself at home? · If your doctor recommended iron pills, take them as directed. ? Try to take the pills on an empty stomach. You can do this about 1 hour before or 2 hours after meals. But you may need to take iron with food to avoid an upset stomach. ? Do not take antacids or drink milk or anything with caffeine within 2 hours of when you take your iron. They can keep your body from absorbing the iron well. ? Vitamin C helps your body absorb iron. You may want to take iron pills with a glass of orange juice or some other food high in vitamin C.  ? Iron pills may cause stomach problems. These include heartburn, nausea, diarrhea, constipation, and cramps. It can help to drink plenty of fluids and include fruits, vegetables, and fiber in your diet.   ? It's normal for iron pills to make your stool a greenish or grayish black. But internal bleeding can also cause dark stool. So it's important to tell your doctor about any color changes. ? Call your doctor if you think you are having a problem with your iron pills. Even after you start to feel better, it will take several months for your body to build up its supply of iron. ? If you miss a pill, don't take a double dose. ? Keep iron pills out of the reach of small children. Too much iron can be very dangerous. · Eat foods with a lot of iron. These include red meat, shellfish, poultry, and eggs. They also include beans, raisins, whole-grain bread, and leafy green vegetables. · Steam your vegetables. This is the best way to prepare them if you want to get as much iron as possible. · Be safe with medicines. Do not take nonsteroidal anti-inflammatory pain relievers unless your doctor tells you to. These include aspirin, naproxen (Aleve), and ibuprofen (Advil, Motrin). · Liquid iron can stain your teeth. But you can mix it with water or juice and drink it with a straw. Then it won't get on your teeth. When should you call for help? Call 911 anytime you think you may need emergency care. For example, call if:    · You passed out (lost consciousness).    Call your doctor now or seek immediate medical care if:    · You are short of breath.     · You are dizzy or light-headed, or you feel like you may faint.     · You have new or worse bleeding.    Watch closely for changes in your health, and be sure to contact your doctor if:    · You feel weaker or more tired than usual.     · You do not get better as expected. Where can you learn more? Go to http://romel-megan.info/. Enter Y107 in the search box to learn more about \"Iron Deficiency Anemia: Care Instructions. \"  Current as of: May 6, 2018  Content Version: 11.9  © 0749-7834 [employer].  Care instructions adapted under license by [facility] (which disclaims liability or warranty for this information). If you have questions about a medical condition or this instruction, always ask your healthcare professional. [address] any warranty or liability for your use of this information. Patient Education        Chest Pain: Care Instructions  Your Care Instructions    There are many things that can cause chest pain. Some are not serious and will get better on their own in a few days. But some kinds of chest pain need more testing and treatment. Your doctor may have recommended a follow-up visit in the next 8 to 12 hours. If you are not getting better, you may need more tests or treatment. Even though your doctor has released you, you still need to watch for any problems. The doctor carefully checked you, but sometimes problems can develop later. If you have new symptoms or if your symptoms do not get better, get medical care right away. If you have worse or different chest pain or pressure that lasts more than 5 minutes or you passed out (lost consciousness), call 911 or seek other emergency help right away. A medical visit is only one step in your treatment. Even if you feel better, you still need to do what your doctor recommends, such as going to all suggested follow-up appointments and taking medicines exactly as directed. This will help you recover and help prevent future problems. How can you care for yourself at home? · Rest until you feel better. · Take your medicine exactly as prescribed. Call your doctor if you think you are having a problem with your medicine. · Do not drive after taking a prescription pain medicine. When should you call for help? Call 911 if:    · You passed out (lost consciousness).     · You have severe difficulty breathing.     · You have symptoms of a heart attack. These may include:  ? Chest pain or pressure, or a strange feeling in your chest.  ? Sweating. ?  Shortness of breath. ? Nausea or vomiting. ? Pain, pressure, or a strange feeling in your back, neck, jaw, or upper belly or in one or both shoulders or arms. ? Lightheadedness or sudden weakness. ? A fast or irregular heartbeat. After you call 911, the  may tell you to chew 1 adult-strength or 2 to 4 low-dose aspirin. Wait for an ambulance. Do not try to drive yourself.    Call your doctor today if:    · You have any trouble breathing.     · Your chest pain gets worse.     · You are dizzy or lightheaded, or you feel like you may faint.     · You are not getting better as expected.     · You are having new or different chest pain. Where can you learn more? Go to http://romel-megan.info/. Enter A120 in the search box to learn more about \"Chest Pain: Care Instructions. \"  Current as of: September 23, 2018  Content Version: 11.9  © 6908-4586 [employer]. Care instructions adapted under license by [employer] (which disclaims liability or warranty for this information). If you have questions about a medical condition or this instruction, always ask your healthcare professional. [place] [address] any warranty or liability for your use of this information. Patient Education        Bruised Rib: Care Instructions  Overview    You can get a bruised rib if you fall or get hit, such as in an accident or while playing sports. The medical term for a bruise is \"contusion. \" Small blood vessels get torn and leak blood under the skin. Most people think of a bruise as a black-and-blue area. But bones and muscles can also get bruised. An injury may damage the rib but not cause a bruise that you can see. Sometimes it can be hard to tell if a rib is bruised or broken. The symptoms may be the same. And a broken bone can't always be seen on an X-ray. But the treatment for a bruised rib is often the same as treatment for a broken one.   An injury to the ribs can cause pain. The pain may be worse when you breathe deeply, cough, or sneeze. In most cases, a bruised rib will heal on its own. You can take pain medicine while the rib mends. Pain relief allows you to take deep breaths. Follow-up care is a key part of your treatment and safety. Be sure to make and go to all appointments, and call your doctor if you are having problems. It's also a good idea to know your test results and keep a list of the medicines you take. How can you care for yourself at home? · Rest and protect the injured or sore area. Stop, change, or take a break from any activity that causes pain. · Put ice or a cold pack on the area for 10 to 20 minutes at a time. Put a thin cloth between the ice and your skin. · After 2 or 3 days, if your swelling is gone, put a heating pad set on low or a warm cloth on your chest. Some doctors suggest that you go back and forth between hot and cold. Put a thin cloth between the heating pad and your skin. · Ask your doctor if you can take an over-the-counter pain medicine, such as acetaminophen (Tylenol), ibuprofen (Advil, Motrin), or naproxen (Aleve). Be safe with medicines. Read and follow all instructions on the label. · As your pain gets better, slowly return to your normal activities. Be patient. Rib bruises can take weeks or months to heal. If the pain gets worse, it may be a sign that you need to rest a while longer. When should you call for help? [id number] anytime you think you may need emergency care. For example, call if:    · You have severe trouble breathing.     Call your doctor now or seek immediate medical care if:    · You have trouble breathing.     · You have a fever.     · You have a new or worse cough.     · You have new or worse pain.    Watch closely for changes in your health, and be sure to contact your doctor if:    · You do not get better as expected. Where can you learn more? Go to http://romel-megan.info/.   Enter C211 in the search box to learn more about \"Bruised Rib: Care Instructions. \"  Current as of: September 23, 2018  Content Version: 11.9  © 7384-5657 [employer]. Care instructions adapted under license by [employer] (which disclaims liability or warranty for this information). If you have questions about a medical condition or this instruction, always ask your healthcare professional. [place] [address] any warranty or liability for your use of this information.

## 2019-06-25 PROBLEM — N17.9 AKI (ACUTE KIDNEY INJURY) (HCC): Status: ACTIVE | Noted: 2019-01-01

## 2019-06-25 PROBLEM — S42.309A HUMERAL FRACTURE: Status: ACTIVE | Noted: 2019-01-01

## 2019-06-25 PROBLEM — S42.302A LEFT HUMERAL FRACTURE: Status: ACTIVE | Noted: 2019-01-01

## 2019-06-25 PROBLEM — S81.809A OPEN LEG WOUND: Status: ACTIVE | Noted: 2019-01-01

## 2019-06-25 PROBLEM — G93.41 METABOLIC ENCEPHALOPATHY: Status: ACTIVE | Noted: 2019-01-01

## 2019-06-25 PROBLEM — G93.41 ACUTE METABOLIC ENCEPHALOPATHY: Status: ACTIVE | Noted: 2019-01-01

## 2019-06-25 NOTE — ED NOTES
TRANSFER - OUT REPORT: 
 
Verbal report given to Al RN(name) on Say Hernandez  being transferred to 7th floor Orthopedics @ [facility](unit) for routine progression of care Report consisted of patients Situation, Background, Assessment and  
Recommendations(SBAR). Information from the following report(s) SBAR was reviewed with the receiving nurse. Lines:  
Peripheral IV 06/25/19 Right Antecubital (Active) Site Assessment Clean, dry, & intact 6/25/2019 11:26 AM  
Phlebitis Assessment 0 6/25/2019 11:26 AM  
Infiltration Assessment 4 6/25/2019 11:26 AM  
Hub Color/Line Status Pink 6/25/2019 11:26 AM  
  
 
Opportunity for questions and clarification was provided. Patient transported with: 
 Monitor O2 @ 4 liters EMS transport

## 2019-06-25 NOTE — ED PROVIDER NOTES
Patient is a 69 yo male with multiple medical problems who presents with multiple falls and increased confusion/AMS. Seen here about 1 week ago and placed on lopressor and then yesterday by PCP and placed on hydrocodone. States he fell at 4:00 am and hit his head and then again this morning and landed on left shoulder and back. Wife states Whitney Saldaña is just acting off\" this morning and states he is not his normal self. Past Medical History:  
Diagnosis Date  Atrial flutter ([address] 2/3/2016  CAD (coronary artery disease)  Cardiomyopathy ([address]  Chronic systolic heart failure ([address] 10/20/2010  CKD (chronic kidney disease) stage 3, GFR 30-59 ml/min ([facility]) 10/18/2010  Diabetic peripheral neuropathy ([facility] 9/15/2015  Dyslipidemia 10/18/2010  Edema  Essential hypertension, benign 9/15/2015  Insomnia 9/15/2015  
 sleep apnea - uses CPAP  
 Mixed hyperlipidemia 9/15/2015  Morbid obesity ([address] 10/18/2010  Nephrolithiasis  Peripheral neuropathy  Renal insufficiency  Right heart failure ([facility])  S/P CABG (coronary artery bypass graft)  Type II or unspecified type diabetes mellitus without mention of complication, uncontrolled ([address] 9/15/2015 Past Surgical History:  
Procedure Laterality Date  CARDIAC SURG PROCEDURE UNLIST By-pass x 5  
 HX CATARACT REMOVAL    
 HX CORONARY STENT PLACEMENT Right   
 femoral artery stent  HX UROLOGICAL Kidney stone removal  
 
   
Family History:  
Problem Relation Age of Onset  No Known Problems Mother  Coronary Artery Disease Father  No Known Problems Sister Social History Socioeconomic History  Marital status:  Spouse name: Not on file  Number of children: Not on file  Years of education: Not on file  Highest education level: Not on file Occupational History  Not on file Social Needs  Financial resource strain: Not on file  Food insecurity: Worry: Not on file Inability: Not on file  Transportation needs:  
  Medical: Not on file Non-medical: Not on file Tobacco Use  Smoking status: Former Smoker Last attempt to quit: 1986 Years since quittin.5  Smokeless tobacco: Never Used Substance and Sexual Activity  Alcohol use: No  
  Comment: quit  Drug use: No  
 Sexual activity: Never Lifestyle  Physical activity:  
  Days per week: Not on file Minutes per session: Not on file  Stress: Not on file Relationships  Social connections:  
  Talks on phone: Not on file Gets together: Not on file Attends Jain service: Not on file Active member of club or organization: Not on file Attends meetings of clubs or organizations: Not on file Relationship status: Not on file  Intimate partner violence:  
  Fear of current or ex partner: Not on file Emotionally abused: Not on file Physically abused: Not on file Forced sexual activity: Not on file Other Topics Concern  Not on file Social History Narrative  Not on file ALLERGIES: Patient has no known allergies. Review of Systems Constitutional: Positive for fatigue. Negative for chills and fever. HENT: Negative for rhinorrhea and sore throat. Eyes: Negative for visual disturbance. Respiratory: Negative for cough and shortness of breath. Cardiovascular: Negative for chest pain and leg swelling. Gastrointestinal: Negative for abdominal pain, diarrhea, nausea and vomiting. Genitourinary: Negative for dysuria. Musculoskeletal: Positive for arthralgias and back pain. Negative for neck pain. Skin: Negative for rash. Neurological: Negative for weakness and headaches. Psychiatric/Behavioral: The patient is not nervous/anxious. Vitals:  
 19 1024 19 1025 BP: 133/60 Pulse: (!) 58 Resp: 22 Temp: 98 °F (36.7 °C) SpO2: (!) 85% 92% Physical Exam  
 Constitutional: He is oriented to person, place, and time. He appears well-developed and well-nourished. HENT:  
Head: Normocephalic. Right Ear: External ear normal.  
Left Ear: External ear normal.  
Eyes: Pupils are equal, round, and reactive to light. Conjunctivae and EOM are normal.  
Neck: Normal range of motion. Neck supple. No tracheal deviation present. Cardiovascular: Normal rate, regular rhythm, normal heart sounds and intact distal pulses. No murmur heard. Pulmonary/Chest: Effort normal and breath sounds normal. No respiratory distress. Abdominal: Soft. There is no tenderness. Musculoskeletal: Normal range of motion. He exhibits tenderness (Tender to palpation of left shoulder and mid thoracic spine). Non-tender to palpation of C and L spine. No stepoffs or deformities noted. Strength 5/5 in all extremities, pulses intact in all extremities distally Left shoulder pain to palpation over humeral head proximal humerus with deformity noted. Radial pulses 2+ bilaterally, ROM of hand/wrist intact, shoulder ROM limited by pain Neurological: He is alert and oriented to person, place, and time. No cranial nerve deficit. Skin: No rash noted. Nursing note and vitals reviewed. MDM Number of Diagnoses or Management Options Amount and/or Complexity of Data Reviewed Clinical lab tests: reviewed and ordered Tests in the radiology section of CPT®: ordered and reviewed Tests in the medicine section of CPT®: ordered and reviewed Review and summarize past medical records: yes Risk of Complications, Morbidity, and/or Mortality Presenting problems: high Diagnostic procedures: high Management options: high Patient Progress Patient progress: stable Procedures Recent Results (from the past 12 hour(s)) CBC WITH AUTOMATED DIFF Collection Time: 06/25/19 11:11 AM  
Result Value Ref Range WBC 11.9 (H) 4.3 - 11.1 K/uL  
 RBC 4.07 (L) 4.23 - 5.6 M/uL HGB 8.3 (L) 13.6 - 17.2 g/dL HCT 29.1 (L) 41.1 - 50.3 % MCV 71.5 (L) 79.6 - 97.8 FL  
 MCH 20.4 (L) 26.1 - 32.9 PG  
 MCHC 28.5 (L) 31.4 - 35.0 g/dL  
 RDW 19.8 (H) 11.9 - 14.6 % PLATELET 849 610 - 856 K/uL MPV 11.0 9.4 - 12.3 FL ABSOLUTE NRBC 0.07 0.0 - 0.2 K/uL  
 DF AUTOMATED NEUTROPHILS 78 43 - 78 % LYMPHOCYTES 11 (L) 13 - 44 % MONOCYTES 7 4.0 - 12.0 % EOSINOPHILS 1 0.5 - 7.8 % BASOPHILS 1 0.0 - 2.0 % IMMATURE GRANULOCYTES 2 0.0 - 5.0 %  
 ABS. NEUTROPHILS 9.3 (H) 1.7 - 8.2 K/UL  
 ABS. LYMPHOCYTES 1.3 0.5 - 4.6 K/UL  
 ABS. MONOCYTES 0.8 0.1 - 1.3 K/UL  
 ABS. EOSINOPHILS 0.1 0.0 - 0.8 K/UL  
 ABS. BASOPHILS 0.1 0.0 - 0.2 K/UL  
 ABS. IMM. GRANS. 0.3 0.0 - 0.5 K/UL METABOLIC PANEL, COMPREHENSIVE Collection Time: 06/25/19 11:11 AM  
Result Value Ref Range Sodium 137 136 - 145 mmol/L Potassium 4.9 3.5 - 5.1 mmol/L Chloride 102 98 - 107 mmol/L  
 CO2 24 21 - 32 mmol/L Anion gap 11 7 - 16 mmol/L Glucose 164 (H) 65 - 100 mg/dL BUN 83 (H) 8 - 23 MG/DL Creatinine 2.47 (H) 0.8 - 1.5 MG/DL  
 GFR est AA 33 (L) >60 ml/min/1.73m2 GFR est non-AA 28 (L) >60 ml/min/1.73m2 Calcium 8.7 8.3 - 10.4 MG/DL Bilirubin, total 0.5 0.2 - 1.1 MG/DL  
 ALT (SGPT) 23 12 - 65 U/L  
 AST (SGOT) 26 15 - 37 U/L Alk. phosphatase 222 (H) 50 - 136 U/L Protein, total 7.6 6.3 - 8.2 g/dL Albumin 2.9 (L) 3.2 - 4.6 g/dL Globulin 4.7 (H) 2.3 - 3.5 g/dL A-G Ratio 0.6 (L) 1.2 - 3.5    
TROPONIN I Collection Time: 06/25/19 11:11 AM  
Result Value Ref Range Troponin-I, Qt. <0.02 (L) 0.02 - 0.05 NG/ML Xr Chest Pa Lat Result Date: 6/25/2019 Chest 2 view dated 6/25/2019 Prior chest x-ray 6/22/2019 CLINICAL INFORMATION: Worsening weakness. Fall Views of the chest show heart to be enlarged and mediastinum unremarkable. Vascularity does not appear congested. No pulmonary infiltrate or pleural effusion. IMPRESSION: No acute abnormality Xr Chest Pa Lat Result Date: 6/22/2019 EXAMINATION: CHEST RADIOGRAPH 6/22/2019 7:49 AM ACCESSION NUMBER: [id number] COMPARISON: 11/06/2016 INDICATION: Chest pain TECHNIQUE: PA and lateral views of the chest were obtained. FINDINGS: Cardiac Silhouette: There is previous CABG. The heart size is persistently prominent. Mediastinum: The aorta is normal. Lungs: No focal airspace disease. Upper Abdomen: Normal Osseous Structures: There are no lytic and blastic lesions. IMPRESSION: No acute abnormality Persistent cardiomegaly without overt evidence of failure Xr Shoulder Lt Ap/lat Min 2 V Result Date: 6/25/2019 Left shoulder 3 view dated 6/25/2019 CLINICAL INFORMATION: Severe pain after fall Views of the shoulder show a fracture near the junction of the proximal middle third of the left humerus with mild lateral displacement and medial angulation of the distal fracture fragment. Degenerative changes noted glenohumeral joint and AC joint. There is no shoulder dislocation. IMPRESSION: Fracture proximal left humerus Xr Humerus Lt Result Date: 6/25/2019 Left humerus 2 view dated 6/25/2019 CLINICAL INFORMATION: Severe pain after fall Views of the humerus show fracture of the humeral shaft with posterior displacement of the distal fracture fragment by about 50% of the width the shaft and mild medial angulation. IMPRESSION: Fracture left humerus Ct Head Wo Cont Result Date: 6/25/2019 CT Brain dated 6/25/2019  Comparison: 7/8/2016 Clinical Information:  Fall today. Increasing weakness  5 mm axial images were obtained from skull base to vertex without contrast. Radiation dose reduction techniques were used for this study. Our scanners use one or all of the following:  Automated exposure control, adjustment of the mA and/or kV according to patient size, iterative reconstruction. Findings: Atrophy is noted. There is no midline shift.  No hemorrhage, mass, mass effect or acute territorial infarction. No extra-axial abnormality. No skull fracture. . There is fluid in the mastoid air cells bilaterally. Paranasal sinuses are aerated and unremarkable. Impression: 1. Atrophy. 2. No acute intracranial abnormality. 3. Fluid in the mastoid air cells bilaterally 67 yo male with multiple falls and AMS: 
 
Discussed humerus fracture with Dr. Crispin Easton and suggests sling and they will follow up in hospital.  Discussed with Dr. Dalton Conner due to UZMA, multiple falls for admission for further management.

## 2019-06-25 NOTE — ED TRIAGE NOTES
Patient arrives from home via EMS with complaint of weakness. He also reports left shoulder pain after a fall he sustained this morning. Patient is alert and oriented. Patient has bilateral lower leg wounds that are being treated by wound care at home on Monday, Wednesday, and Fridays. Patient's family tells EMS they are concerned that he is over medicated.

## 2019-06-25 NOTE — H&P
Hospitalist Admission History and Physical  
 
NAME:  Maty Sherman Age:  68 y.o. 
:   1950 MRN:   [id number] PCP: Benjamín Villa MD 
Consulting MD: Treatment Team: Attending Provider: Susan Hassan MD 
 
No chief complaint on file. HPI:  
Patient is a 68 y.o. male who presented to the ED for cc multiple falls with increased confusion. Hx of atrial flutter on warfarin, CKD stage III, DM type II insulin dependent, PAD, CAD s/p CABG, and chronic ulcerations of lower legs bilaterally. Wife is at bedside who gives most of the hx. No fevers at home and no worsening lesions to legs. Patient has been taking ambien along with his new prescription of hydrocodone since discharge last week for fall with low back pain. Wife states patient is slow to respond and lethargic. 
  
Vitals - HR 58. 85% on RA. 
  
Labs- WBC 11.9, Hg 8.3, MCV 71.5, Creatine 2.47 from baseline of 1.6.  
  
CT head showed no acute abnormality Left arm x ray showed left humeral fracture Past Medical History:  
Diagnosis Date  Atrial flutter ([address] 2/3/2016  CAD (coronary artery disease)  Cardiomyopathy ([address]  Chronic systolic heart failure ([address] 10/20/2010  CKD (chronic kidney disease) stage 3, GFR 30-59 ml/min ([facility]) 10/18/2010  Diabetic peripheral neuropathy ([address] 9/15/2015  Dyslipidemia 10/18/2010  Edema  Essential hypertension, benign 9/15/2015  Insomnia 9/15/2015  
 sleep apnea - uses CPAP  
 Mixed hyperlipidemia 9/15/2015  Morbid obesity ([address] 10/18/2010  Nephrolithiasis  Peripheral neuropathy  Renal insufficiency  Right heart failure ([facility])  S/P CABG (coronary artery bypass graft)  Type II or unspecified type diabetes mellitus without mention of complication, uncontrolled ([address] 9/15/2015 Past Surgical History:  
Procedure Laterality Date  CARDIAC SURG PROCEDURE UNLIST  By-pass x 5  
 HX CATARACT REMOVAL    
  HX CORONARY STENT PLACEMENT Right   
 femoral artery stent  HX UROLOGICAL Kidney stone removal  
  
 
Family History Problem Relation Age of Onset  No Known Problems Mother  Coronary Artery Disease Father  No Known Problems Sister Social History Social History Narrative  Not on file Social History Tobacco Use  Smoking status: Former Smoker Last attempt to quit: 1986 Years since quittin.5  Smokeless tobacco: Never Used Substance Use Topics  Alcohol use: No  
  Comment: quit Social History Substance and Sexual Activity Drug Use No  
 
 
 
No Known Allergies Prior to Admission medications Medication Sig Start Date End Date Taking? Authorizing Provider HYDROcodone-acetaminophen (NORCO) 5-325 mg per tablet Take 1 Tab by mouth every eight (8) hours as needed for Pain for up to 3 days. Max Daily Amount: 3 Tabs. 19  Patricia Fajardo MD  
metoprolol tartrate (LOPRESSOR) 25 mg tablet Take 0.5 Tabs by mouth two (2) times a day. 19   Patricia Fajardo MD  
pregabalin (LYRICA) 300 mg capsule 1 bid 19   Bruce Schilling MD  
zolpidem (AMBIEN) 10 mg tablet 1/2 to 1 qhs 19   Bruce Schillign MD  
atorvastatin (LIPITOR) 40 mg tablet 1 every day for cholesterol (replaces simvastatin) 19   Bruce Schilling MD  
warfarin (COUMADIN) 5 mg tablet Take 1 Tab by mouth daily.  18   Anila Casillas MD  
VITAMIN D2 50,000 unit capsule 1 qweek for Vitamin D replacement 18   Bruce Schilling MD  
azelastine (ASTELIN) 137 mcg (0.1 %) nasal spray Use in each nostril bid 18   Bruce Schilling MD  
azelastine (OPTIVAR) 0.05 % ophthalmic solution Use in affected eye bid to tid for allergies 18   Bruce Schilling MD  
insulin NPH (HUMULIN N NPH U-100 INSULIN) 100 unit/mL injection 100 to 150 units bid per sliding scale   DX E11.65  Indications: type 2 diabetes mellitus 6/1/18   Guillermo Rivero MD  
insulin glulisine U-100 (APIDRA U-100 INSULIN) 100 unit/mL injection Up to 150 units tid per sliding scale    DX E11.65  Indications: type 2 diabetes mellitus, with meals 6/1/18   Guillermo Rivero MD  
Insulin Syringe-Needle U-100 (BD INSULIN SYRINGE) 1 mL 28 gauge x 1/2\" syrg Use 5 x a day. Dx E11.65  Indications: 5x a day 6/1/18   Guillermo Rivero MD  
glucose blood VI test strips (ONETOUCH ULTRA TEST) strip Use bid to tid   DX: E 11.65   Disp with lancets of formulary  Indications: dispense with lancets 6/1/18   Guillermo Rivero MD  
bumetanide Nestora Curio) 1 mg tablet 1 to 2 every day for fluid 2/23/18   Guillermo Rivero MD  
potassium chloride (KLOR-CON) 10 mEq tablet 1 every day for potassium 2/23/18   Guillermo Rivero MD  
DULoxetine (CYMBALTA) 60 mg capsule Take 1 Cap by mouth daily. Indications: ANXIETY WITH DEPRESSION, NEUROPATHIC PAIN 2/23/18   Guillermo Rivero MD  
allopurinol (ZYLOPRIM) 100 mg tablet 1 bid for gout control 2/23/18   Guillermo Rivero MD  
lancets (Giovana Stade LANCETS) 30 gauge misc Use up to tid   Dx E11.65 2/23/18   Guillermo Rivero MD  
losartan (COZAAR) 100 mg tablet 1 every day for BP  Indications: hypertension 1/22/18   Guillermo Rivero MD  
Blood-Glucose Meter [facility]) monitoring kit Use bid to tid   DX E11.65  If another product preferred on formulary please let me know which to select from. jlb 4/20/17   Guillermo Rivero MD  
fexofenadine-pseudoephedrine (ALLEGRA-D 24) 180-240 mg per tablet Take 1 Tab by mouth daily as needed. Indications: ALLERGIC RHINITIS 11/22/16   Brian Hernandez MD  
aspirin 81 mg chewable tablet Take 1 Tab by mouth daily. 11/22/16   Brian Hernandez MD  
polyethylene glycol (MIRALAX) 17 gram packet Take 1 Packet by mouth daily. 11/22/16   Brian Hernandez MD  
hydrocortisone (ANUSOL-HC) 2.5 % rectal cream Insert  into rectum four (4) times daily.  11/14/16   Annie Fink PA-C  
 nitroglycerin (NITROSTAT) 0.4 mg SL tablet 1 Tab by SubLINGual route as needed for Chest Pain. 4/1/16   Singh Webster MD  
fluticasone Moreno Dear) 50 mcg/actuation nasal spray 1 spray IEN every day to BID 10/14/15   Christina Rubi MD  
 
 
 
 
Review of Systems Cannot obtain due to AMS. Does admit to lower back pain. Objective: There were no vitals taken for this visit. No intake/output data recorded. No intake/output data recorded. Data Review:  
Recent Results (from the past 24 hour(s)) EKG, 12 LEAD, INITIAL Collection Time: 06/25/19 11:10 AM  
Result Value Ref Range Ventricular Rate 66 BPM  
 Atrial Rate 133 BPM  
 QRS Duration 106 ms  
 Q-T Interval 458 ms QTC Calculation (Bezet) 480 ms Calculated R Axis 60 degrees Calculated T Axis 151 degrees Diagnosis Atrial fibrillation Low voltage QRS Cannot rule out Anterior infarct (cited on or before 28-NOV-2003) Abnormal ECG When compared with ECG of 22-JUN-2019 07:11, 
ST no longer depressed in Inferior leads Confirmed by ST KATHIE RAMOS MD (), TG MISHRA ([id number]) on 6/25/2019 2:05:31 PM 
  
CBC WITH AUTOMATED DIFF Collection Time: 06/25/19 11:11 AM  
Result Value Ref Range WBC 11.9 (H) 4.3 - 11.1 K/uL  
 RBC 4.07 (L) 4.23 - 5.6 M/uL HGB 8.3 (L) 13.6 - 17.2 g/dL HCT 29.1 (L) 41.1 - 50.3 % MCV 71.5 (L) 79.6 - 97.8 FL  
 MCH 20.4 (L) 26.1 - 32.9 PG  
 MCHC 28.5 (L) 31.4 - 35.0 g/dL  
 RDW 19.8 (H) 11.9 - 14.6 % PLATELET 764 331 - 309 K/uL MPV 11.0 9.4 - 12.3 FL ABSOLUTE NRBC 0.07 0.0 - 0.2 K/uL  
 DF AUTOMATED NEUTROPHILS 78 43 - 78 % LYMPHOCYTES 11 (L) 13 - 44 % MONOCYTES 7 4.0 - 12.0 % EOSINOPHILS 1 0.5 - 7.8 % BASOPHILS 1 0.0 - 2.0 % IMMATURE GRANULOCYTES 2 0.0 - 5.0 %  
 ABS. NEUTROPHILS 9.3 (H) 1.7 - 8.2 K/UL  
 ABS. LYMPHOCYTES 1.3 0.5 - 4.6 K/UL  
 ABS. MONOCYTES 0.8 0.1 - 1.3 K/UL  
 ABS. EOSINOPHILS 0.1 0.0 - 0.8 K/UL  
 ABS. BASOPHILS 0.1 0.0 - 0.2 K/UL ABS. IMM. GRANS. 0.3 0.0 - 0.5 K/UL METABOLIC PANEL, COMPREHENSIVE Collection Time: 06/25/19 11:11 AM  
Result Value Ref Range Sodium 137 136 - 145 mmol/L Potassium 4.9 3.5 - 5.1 mmol/L Chloride 102 98 - 107 mmol/L  
 CO2 24 21 - 32 mmol/L Anion gap 11 7 - 16 mmol/L Glucose 164 (H) 65 - 100 mg/dL BUN 83 (H) 8 - 23 MG/DL Creatinine 2.47 (H) 0.8 - 1.5 MG/DL  
 GFR est AA 33 (L) >60 ml/min/1.73m2 GFR est non-AA 28 (L) >60 ml/min/1.73m2 Calcium 8.7 8.3 - 10.4 MG/DL Bilirubin, total 0.5 0.2 - 1.1 MG/DL  
 ALT (SGPT) 23 12 - 65 U/L  
 AST (SGOT) 26 15 - 37 U/L Alk. phosphatase 222 (H) 50 - 136 U/L Protein, total 7.6 6.3 - 8.2 g/dL Albumin 2.9 (L) 3.2 - 4.6 g/dL Globulin 4.7 (H) 2.3 - 3.5 g/dL A-G Ratio 0.6 (L) 1.2 - 3.5    
TROPONIN I Collection Time: 06/25/19 11:11 AM  
Result Value Ref Range Troponin-I, Qt. <0.02 (L) 0.02 - 0.05 NG/ML  
TSH 3RD GENERATION Collection Time: 06/25/19 11:11 AM  
Result Value Ref Range TSH 1.910 uIU/mL HEMOGLOBIN A1C WITH EAG Collection Time: 06/25/19 11:11 AM  
Result Value Ref Range Hemoglobin A1c 7.6 % Est. average glucose 171 mg/dL URINALYSIS W/ RFLX MICROSCOPIC Collection Time: 06/25/19 12:51 PM  
Result Value Ref Range Color YELLOW Appearance CLEAR Specific gravity 1.019 1.001 - 1.023    
 pH (UA) 5.0 5.0 - 9.0 Protein NEGATIVE  NEG mg/dL Glucose NEGATIVE  mg/dL Ketone NEGATIVE  NEG mg/dL Bilirubin NEGATIVE  NEG Blood NEGATIVE  NEG Urobilinogen 0.2 0.2 - 1.0 EU/dL Nitrites NEGATIVE  NEG Leukocyte Esterase NEGATIVE  NEG Physical Exam:  
 
General:  Alert, cooperative, uncomfortable. Laying flat in bed and limited in mobility due to back pain Eyes:  Conjunctivae/corneas clear. Ears:  Normal TMs and external ear canals both ears. Nose: Nares normal. Septum midline. Mouth/Throat: Dry oral mucosa. Neck:  no JVD. Morbidly obese. Back:   Dry skin. Mild tenderness to palpation around L4  
Lungs:   Limited breathe sounds. Heart:  Regular rate and rhythm, S1, S2 normal  
Abdomen:   Soft, non-tender. Bowel sounds normal. No masses,  No organomegaly. Morbidly obese. Extremities: Dry skin. LE wrapped bilaterally from knees to feet. Right foot has open ulcer without drainage. Mild bleeding seen on bandage from ulcer Pulses: 2+ and symmetric all extremities. Skin: As above Lymph nodes: Cervical, supraclavicular, and axillary nodes normal.  
Neurologic: Can tell me the year and who the president is but cannot tell me where he is. Slow to answer questions. Assessment and Plan Principal Problem: 
  Acute metabolic encephalopathy (9/41/0823) Active Problems: 
  CAD (coronary artery disease)--cath in 2008 noted patent grafts, EF 35-40% (10/18/2010) Overview: ASCAD 
 
  CKD (chronic kidney disease) stage 3, GFR 30-59 ml/min ([facility]) (10/18/2010) Morbid obesity ([address] (10/18/2010) Chronic systolic heart failure ([address] (10/20/2010) Essential hypertension, benign (9/15/2015) Mixed hyperlipidemia (9/15/2015) Diabetes mellitus type 2, controlled ([address] (12/15/2015) Atrial flutter ([address] (2/3/2016) Peripheral vascular disease; arterial ([facility]) () 
 
  S/P CABG (coronary artery bypass graft) () Overview: Cath 2010 stable JASON (obstructive sleep apnea) (4/7/2016) Humeral fracture (6/25/2019) UZMA (acute kidney injury) ([address] (6/25/2019) Acute metabolic encephalopathy - Order UA to ensure no UTI causing AMS. Order TSH and ammonia. ABG ordered. Suspecting AMS from taking his Ambien along with hydrocodone. AMS could be due to pain as well. Avoid over sedation. Chest x ray to me looks more hazy in [place]. Will order procalcitonin. If procalcitonin elevated, will treat for pneumonia.  Order breathing treatments.  
  
 Left humeral fracture - Consult ortho. Discussed patient is at increased risk of surgical complications and delirium due to hx and age. Wife would still like surgery. Consult PT/OT.  
  
UZMA- Pre renal. Suspecting from poor PO intake and possible from hypoxia since noted when in ED. IV hydration.  
  
CAD s/p CABG with HTN - Hold BP medication in setting of being NPO since altered and BP controlled. Statin. ASA Atrial flutter/fibrilltation - Takes warfarin at home. Hold warfarin and start heparin drip. Wife states he took no home medications today.  
  
DM type II - SS. Check A1C.  
  
Lower back pain - Order x ray of lumbar and thoracic spine. Hold lyrica.  
  
Chronic leg wounds - Consult wound care.  
  
Will transfer to [facility] since has multiple co morbidities and may require multiple specialists.  
  
Accepting provider - Dr. Micha Rico.   
  
DVT prophylaxis - SCDs. Heparin drip Signed By: Letty Ceron DO  
June 25, 2019

## 2019-06-25 NOTE — H&P
Hospitalist Admission History and Physical  
 
NAME:  Terrie Harrison Age:  68 y.o. 
:   1950 MRN:   [id number] PCP: Lily Moreno MD 
Consulting MD: Treatment Team: Primary Nurse: Pablito Ly RN Chief Complaint Patient presents with  Fatigue HPI:  
Patient is a 68 y.o. male who presented to the ED for cc multiple falls with increased confusion. Hx of atrial flutter not on anticoagulation, CKD stage III, DM type II insulin dependent, PAD, CAD s/p CABG, and chronic ulcerations of lower legs bilaterally. Wife is at bedside who gives most of the hx. No fevers at home and no worsening lesions to legs. Patient has been taking ambien along with his new prescription of hydrocodone since discharge last week for fall with low back pain. Vitals - HR 58. 85% on RA. Labs- WBC 11.9, Hg 8.3, MCV 71.5, Creatine 2.47 from baseline CT head showed no acute abnormality Past Medical History:  
Diagnosis Date  Atrial flutter ([address] 2/3/2016  CAD (coronary artery disease)  Cardiomyopathy ([address]  Chronic systolic heart failure ([address] 10/20/2010  CKD (chronic kidney disease) stage 3, GFR 30-59 ml/min ([facility]) 10/18/2010  Diabetic peripheral neuropathy ([address] 9/15/2015  Dyslipidemia 10/18/2010  Edema  Essential hypertension, benign 9/15/2015  Insomnia 9/15/2015  
 sleep apnea - uses CPAP  
 Mixed hyperlipidemia 9/15/2015  Morbid obesity ([address] 10/18/2010  Nephrolithiasis  Peripheral neuropathy  Renal insufficiency  Right heart failure (HCC)  S/P CABG (coronary artery bypass graft)  Type II or unspecified type diabetes mellitus without mention of complication, uncontrolled ([address] 9/15/2015 Past Surgical History:  
Procedure Laterality Date  CARDIAC SURG PROCEDURE UNLIST By-pass x 5  
 HX CATARACT REMOVAL    
 HX CORONARY STENT PLACEMENT Right   
 femoral artery stent  HX UROLOGICAL  Kidney stone removal  
 Family History Problem Relation Age of Onset  No Known Problems Mother  Coronary Artery Disease Father  No Known Problems Sister Social History Social History Narrative  Not on file Social History Tobacco Use  Smoking status: Former Smoker Last attempt to quit: 1986 Years since quittin.5  Smokeless tobacco: Never Used Substance Use Topics  Alcohol use: No  
  Comment: quit Social History Substance and Sexual Activity Drug Use No  
 
 
 
No Known Allergies Prior to Admission medications Medication Sig Start Date End Date Taking? Authorizing Provider HYDROcodone-acetaminophen (NORCO) 5-325 mg per tablet Take 1 Tab by mouth every eight (8) hours as needed for Pain for up to 3 days. Max Daily Amount: 3 Tabs. 19  Genet Bansal MD  
metoprolol tartrate (LOPRESSOR) 25 mg tablet Take 0.5 Tabs by mouth two (2) times a day. 19   Genet Bansal MD  
pregabalin (LYRICA) 300 mg capsule 1 bid 19   Austin Prasad MD  
zolpidem (AMBIEN) 10 mg tablet 1/2 to 1 qhs 19   Austin Prasad MD  
atorvastatin (LIPITOR) 40 mg tablet 1 every day for cholesterol (replaces simvastatin) 19   Austin Prasad MD  
warfarin (COUMADIN) 5 mg tablet Take 1 Tab by mouth daily.  18   Regina Mota MD  
VITAMIN D2 50,000 unit capsule 1 qweek for Vitamin D replacement 18   Austin Prasad MD  
azelastine (ASTELIN) 137 mcg (0.1 %) nasal spray Use in each nostril bid 18   Austin Prasad MD  
azelastine (OPTIVAR) 0.05 % ophthalmic solution Use in affected eye bid to tid for allergies 18   Austin Prasad MD  
insulin NPH (HUMULIN N NPH U-100 INSULIN) 100 unit/mL injection 100 to 150 units bid per sliding scale   DX E11.65  Indications: type 2 diabetes mellitus 18   Austin Prasad MD  
insulin glulisine U-100 (APIDRA U-100 INSULIN) 100 unit/mL injection Up to 150 units tid per sliding scale    DX E11.65  Indications: type 2 diabetes mellitus, with meals 6/1/18   Ayo Calixto MD  
Insulin Syringe-Needle U-100 (BD INSULIN SYRINGE) 1 mL 28 gauge x 1/2\" syrg Use 5 x a day. Dx E11.65  Indications: 5x a day 6/1/18   Ayo Calixto MD  
glucose blood VI test strips (ONETOUCH ULTRA TEST) strip Use bid to tid   DX: E 11.65   Disp with lancets of formulary  Indications: dispense with lancets 6/1/18   Ayo Calixto MD  
bumetanide Sciota Maser) 1 mg tablet 1 to 2 every day for fluid 2/23/18   Ayo Calixto MD  
potassium chloride (KLOR-CON) 10 mEq tablet 1 every day for potassium 2/23/18   Ayo Calixto MD  
DULoxetine (CYMBALTA) 60 mg capsule Take 1 Cap by mouth daily. Indications: ANXIETY WITH DEPRESSION, NEUROPATHIC PAIN 2/23/18   Ayo Calixto MD  
allopurinol (ZYLOPRIM) 100 mg tablet 1 bid for gout control 2/23/18   Ayo Calixto MD  
lancets (Jessica Ours LANCETS) 30 gauge misc Use up to tid   Dx E11.65 2/23/18   Ayo Calixto MD  
losartan (COZAAR) 100 mg tablet 1 every day for BP  Indications: hypertension 1/22/18   Ayo Calixto MD  
Blood-Glucose Meter [facility]) monitoring kit Use bid to tid   DX E11.65  If another product preferred on formulary please let me know which to select from. jlb 4/20/17   Ayo Calixto MD  
fexofenadine-pseudoephedrine (ALLEGRA-D 24) 180-240 mg per tablet Take 1 Tab by mouth daily as needed. Indications: ALLERGIC RHINITIS 11/22/16   Marya Avila MD  
aspirin 81 mg chewable tablet Take 1 Tab by mouth daily. 11/22/16   Marya Avila MD  
polyethylene glycol (MIRALAX) 17 gram packet Take 1 Packet by mouth daily. 11/22/16   Marya Avila MD  
hydrocortisone (ANUSOL-HC) 2.5 % rectal cream Insert  into rectum four (4) times daily.  11/14/16   Annie Fink PA-C  
nitroglycerin (NITROSTAT) 0.4 mg SL tablet 1 Tab by SubLINGual route as needed for Chest Pain. 4/1/16   Princess Manuel MD  
fluticasone Shea Wilkinsolz) 50 mcg/actuation nasal spray 1 spray IEN every day to BID 10/14/15   Alondra Rose MD  
 
 
 
 
Review of Systems Cannot obtain due to AMS. Does admit to lower back pain. Objective:  
 
Visit Vitals /88 Pulse 63 Temp 98 °F (36.7 °C) Resp 20 SpO2 98% No intake/output data recorded. No intake/output data recorded. Data Review:  
Recent Results (from the past 24 hour(s)) EKG, 12 LEAD, INITIAL Collection Time: 06/25/19 11:10 AM  
Result Value Ref Range Ventricular Rate 66 BPM  
 Atrial Rate 133 BPM  
 QRS Duration 106 ms  
 Q-T Interval 458 ms QTC Calculation (Bezet) 480 ms Calculated R Axis 60 degrees Calculated T Axis 151 degrees Diagnosis Atrial fibrillation Low voltage QRS Cannot rule out Anterior infarct (cited on or before 28-NOV-2003) Abnormal ECG When compared with ECG of 22-JUN-2019 07:11, 
ST no longer depressed in Inferior leads CBC WITH AUTOMATED DIFF Collection Time: 06/25/19 11:11 AM  
Result Value Ref Range WBC 11.9 (H) 4.3 - 11.1 K/uL  
 RBC 4.07 (L) 4.23 - 5.6 M/uL HGB 8.3 (L) 13.6 - 17.2 g/dL HCT 29.1 (L) 41.1 - 50.3 % MCV 71.5 (L) 79.6 - 97.8 FL  
 MCH 20.4 (L) 26.1 - 32.9 PG  
 MCHC 28.5 (L) 31.4 - 35.0 g/dL  
 RDW 19.8 (H) 11.9 - 14.6 % PLATELET 420 123 - 315 K/uL MPV 11.0 9.4 - 12.3 FL ABSOLUTE NRBC 0.07 0.0 - 0.2 K/uL  
 DF AUTOMATED NEUTROPHILS 78 43 - 78 % LYMPHOCYTES 11 (L) 13 - 44 % MONOCYTES 7 4.0 - 12.0 % EOSINOPHILS 1 0.5 - 7.8 % BASOPHILS 1 0.0 - 2.0 % IMMATURE GRANULOCYTES 2 0.0 - 5.0 %  
 ABS. NEUTROPHILS 9.3 (H) 1.7 - 8.2 K/UL  
 ABS. LYMPHOCYTES 1.3 0.5 - 4.6 K/UL  
 ABS. MONOCYTES 0.8 0.1 - 1.3 K/UL  
 ABS. EOSINOPHILS 0.1 0.0 - 0.8 K/UL  
 ABS. BASOPHILS 0.1 0.0 - 0.2 K/UL  
 ABS. IMM. GRANS. 0.3 0.0 - 0.5 K/UL METABOLIC PANEL, COMPREHENSIVE  Collection Time: 06/25/19 11:11 AM  
 Result Value Ref Range Sodium 137 136 - 145 mmol/L Potassium 4.9 3.5 - 5.1 mmol/L Chloride 102 98 - 107 mmol/L  
 CO2 24 21 - 32 mmol/L Anion gap 11 7 - 16 mmol/L Glucose 164 (H) 65 - 100 mg/dL BUN 83 (H) 8 - 23 MG/DL Creatinine 2.47 (H) 0.8 - 1.5 MG/DL  
 GFR est AA 33 (L) >60 ml/min/1.73m2 GFR est non-AA 28 (L) >60 ml/min/1.73m2 Calcium 8.7 8.3 - 10.4 MG/DL Bilirubin, total 0.5 0.2 - 1.1 MG/DL  
 ALT (SGPT) 23 12 - 65 U/L  
 AST (SGOT) 26 15 - 37 U/L Alk. phosphatase 222 (H) 50 - 136 U/L Protein, total 7.6 6.3 - 8.2 g/dL Albumin 2.9 (L) 3.2 - 4.6 g/dL Globulin 4.7 (H) 2.3 - 3.5 g/dL A-G Ratio 0.6 (L) 1.2 - 3.5    
TROPONIN I Collection Time: 06/25/19 11:11 AM  
Result Value Ref Range Troponin-I, Qt. <0.02 (L) 0.02 - 0.05 NG/ML Physical Exam:  
 
General:  Alert, cooperative, uncomfortable. Laying flat in bed and limited in mobility due to back pain Eyes:  Conjunctivae/corneas clear. Ears:  Normal TMs and external ear canals both ears. Nose: Nares normal. Septum midline. Mouth/Throat: Dry oral mucosa. Neck:  no JVD. Morbidly obese. Back:   Dry skin. Mild tenderness to palpation around L4  
Lungs:   Limited breathe sounds. Heart:  Regular rate and rhythm, S1, S2 normal  
Abdomen:   Soft, non-tender. Bowel sounds normal. No masses,  No organomegaly. Morbidly obese. Extremities: Dry skin. LE wrapped bilaterally from knees to feet. Right foot has open ulcer without drainage. Mild bleeding seen on bandage from ulcer Pulses: 2+ and symmetric all extremities. Skin: As above Lymph nodes: Cervical, supraclavicular, and axillary nodes normal.  
Neurologic: Can tell me the year and who the president is but cannot tell me where he is. Slow to answer questions. Assessment and Plan Principal Problem: 
  Acute metabolic encephalopathy (4/95/5238) Active Problems: 
  CAD (coronary artery disease)--cath in 2008 noted patent grafts, EF 35-40% (10/18/2010) Overview: [facility] 
 
  CKD (chronic kidney disease) stage 3, GFR 30-59 ml/min ([facility]) (10/18/2010) Morbid obesity ([facility] (10/18/2010) Chronic systolic heart failure ([facility] (10/20/2010) Diabetes mellitus type 2, controlled ([facility] (12/15/2015) Atrial flutter ([facility] (2/3/2016) S/P CABG (coronary artery bypass graft) () Overview: Cath 2010 stable JASON (obstructive sleep apnea) (4/7/2016) Open leg wound (6/25/2019) Acute metabolic encephalopathy - Order UA to ensure no UTI causing AMS. Order TSH and ammonia. ABG ordered. Suspecting AMS from taking his Ambien along with hydrocodone. AMS could be due to pain as well. Avoid over sedation. Chest x ray to me looks more hazy in [place]. Will order procalcitonin. If procalcitonin elevated, will treat for pneumonia. Order breathing treatments. Left humeral fracture - Consult ortho. Discussed patient is at increased risk of surgical complications and delirium due to hx and age. Wife would still like surgery. Consult PT/OT. UZMA- Pre renal. Suspecting from poor PO intake and possible from hypoxia since noted when in ED. IV hydration. CAD s/p CABG with HTN - Hold BP medication in setting of being NPO since altered and BP controlled. Statin. ASA 
 
DM type II - SS. Check A1C. Lower back pain - Order x ray of lumbar and thoracic spine. Hold lyrica. Chronic leg wounds - Consult wound care. Will transfer to [facility] since has multiple co morbidities and may require multiple specialists. Accepting provider - Dr. Emelia Estrella. DVT prophylaxis - SCDs Signed By: Ganesh Licea,   
June 25, 2019

## 2019-06-25 NOTE — ED NOTES
Patient is not at his baseline from when this RN had him at his last visit. There are no focal deficits, however he is \"not himself\" and his wife confirms this. Patient was off of his oxygen for a large portion of last night r/t falls. MD aware.

## 2019-06-26 NOTE — PROGRESS NOTES
Per chart review, pt has a displaced L humerus fracture and is pending orthopedic surgery consult. Will follow up for evaluation post ortho consult to determine parameters/ POC.  
 
Quinton Khan, OT

## 2019-06-26 NOTE — PROGRESS NOTES
SW met with patient and spouse in room to discuss discharge planning needs. Patient participated in discussion, but appeared slightly confused. Information primarily obtained from spouse. SOCIAL: 
Patient and spouse live in a ground level condo (1 level) with 2 steps to entrance and shower/tub with one grab bar. He has 2 adult children who live in [place] and [place]. No other family locally. 3 neighbors who could assist lightly with errands (groceries for example). Wife is primary caregiver. She is able bodied, and is retired. Available round the clock. Patient is not a . His insurance is confirmed as [facility] and Medicare (Medicare is secondary). His prescription costs are affordable. PCP confirmed as Balaji Erazo (last visit on 6-24-19). No POA. No history of mental health or substance abuse issues reported. Source of income is social security (both pt and spouse) and [employer] pension (both). MOBILITY / HISTORY: At baseline, patient ambulates with rolling walker or wheelchair in the home. Spouse assists patient with all ADLs. He does not drive. Wife provides transportation. DME - RW, rollator, wheelchair, shower chair, adjustable bed (not a hospital bed). Home oxygen with [employer] (2L continuous). No dialysis history. No privately paid sitters, aids, or caregivers. Wife considering hiring Noel Buckner in future. No CLTC hours. IRC history approximatley 2-3 years ago. [facility] (confirmed [phone] / fax [phone]). PLAN FOR DISCHARGE: Anticipate DC to home with [facility] resumed for RN wound care. PT/OT evaluations are pending. Possible need for bariatric BSC if patient is discharged to home. Specific plan to be determined pending therapy recommendations. Care Management Interventions PCP Verified by CM: Yes Mode of Transport at Discharge: Other (see comment) Transition of Care Consult (CM Consult): Discharge Planning Discharge Durable Medical Equipment: No 
Physical Therapy Consult: Yes Occupational Therapy Consult: Yes Speech Therapy Consult: No 
Current Support Network: Own Home, Lives with Spouse Confirm Follow Up Transport: Family Plan discussed with Pt/Family/Caregiver: Yes(Spoke with patient and spouse (patient slightly confused). ) Freedom of Choice Offered: Yes Discharge Location Discharge Placement: Unable to determine at this time

## 2019-06-26 NOTE — PROGRESS NOTES
Very histrionic when attempting ADL's w OT and staff Actually , finally able to do a lot more than he is willing to admit. Acts very afraid to sit up in bed ,yet wife says he \"gets around at home with his walker\" Pain always \"10\" regardless of meds/interventions. When sitting up screams \"sitting on my balls\", one minute, then no c/o in same position. Will not even feed himself /asks wife to feed him Complete bath, encouraged max ADL's as tolerated , TCDB and OOB to prevent PN Warned prolonged BR and sedation can increase chance of iatrogenic complications , PN etc. 
Tightened sling to LUE

## 2019-06-26 NOTE — PROGRESS NOTES
Progress Note 2019 Admit Date: 2019  2:33 PM  
NAME: Mendel Kettle :  1950 MRN:  [id number] Attending: Tyler Dawkins MD 
PCP:  Sharlene Dixon MD 
Treatment Team: Attending Provider: Tyler Dawkins MD; Utilization Review: Anna Marie Ordonez RN; Consulting Provider: Caitlyn Casey MD; Physical Therapist: Elizabeth Fox, PT, DPT; Occupational Therapist: Fredrich Bumpers, OT Full Code SUBJECTIVE:  
Mr. Tanya Gallegos is a 67 yo male with PMH of aflutter on coumadin, CAD, cardiomyopathy, chronic systolic CHF, CKD 3, diabetic peripheral neuropathy, HTN, JASON uses CPAP, DM II, chronic ulcerations of LE bilaterally who presented after multiple falls and with increased confusion. Wife reported pt had started taking ambien and hydrocodone since a fall with lower back pain last week. Pt found to have a left humeral fx. Ortho consulted, deemed non surgical.  CT head showed no acute findings. Creatinine elevated at 2.47 on admission, baseline around 1.8. Creatinine trending down today after IVF. UA without signs of infection. Wound care consulted for LE wounds. This morning he was confused during my exam, thinks he is in a [place] in [facility]. Past Medical History:  
Diagnosis Date  Atrial flutter ([address] 2/3/2016  CAD (coronary artery disease)  Cardiomyopathy ([address]  Chronic systolic heart failure ([address] 10/20/2010  CKD (chronic kidney disease) stage 3, GFR 30-59 ml/min ([facility]) 10/18/2010  Diabetic peripheral neuropathy ([address] 9/15/2015  Dyslipidemia 10/18/2010  Edema  Essential hypertension, benign 9/15/2015  Insomnia 9/15/2015  
 sleep apnea - uses CPAP  
 Mixed hyperlipidemia 9/15/2015  Morbid obesity ([address] 10/18/2010  Nephrolithiasis  Peripheral neuropathy  Renal insufficiency  Right heart failure (HCC)  S/P CABG (coronary artery bypass graft)  Type II or unspecified type diabetes mellitus without mention of complication, uncontrolled ([facility] 9/15/2015 Recent Results (from the past 24 hour(s)) GLUCOSE, POC Collection Time: 06/25/19  2:50 PM  
Result Value Ref Range Glucose (POC) 187 (H) 65 - 100 mg/dL POC G3 Collection Time: 06/25/19  3:12 PM  
Result Value Ref Range Device: NASAL CANNULA pH (POC) 7.387 7.35 - 7.45    
 pCO2 (POC) 38.2 35 - 45 MMHG  
 pO2 (POC) 87 75 - 100 MMHG  
 HCO3 (POC) 23.0 22 - 26 MMOL/L  
 sO2 (POC) 97 95 - 98 % Base deficit (POC) 2 mmol/L Allens test (POC) NO Site RIGHT BRACHIAL Patient temp. 98.6 Specimen type (POC) ARTERIAL Performed by Trixie   
 CO2, POC 24 MMOL/L Flow rate (POC) 4.000 L/min COLLECT TIME 1,505 AMMONIA Collection Time: 06/25/19  3:48 PM  
Result Value Ref Range Ammonia 17 11 - 32 UMOL/L  
PROTHROMBIN TIME + INR Collection Time: 06/25/19  3:48 PM  
Result Value Ref Range Prothrombin time 26.8 (H) 11.7 - 14.5 sec INR 2.5 PTT Collection Time: 06/25/19  3:48 PM  
Result Value Ref Range aPTT 43.8 (H) 24.7 - 39.8 SEC  
GLUCOSE, POC Collection Time: 06/25/19  7:53 PM  
Result Value Ref Range Glucose (POC) 206 (H) 65 - 100 mg/dL CBC WITH AUTOMATED DIFF Collection Time: 06/26/19  4:01 AM  
Result Value Ref Range WBC 10.5 4.3 - 11.1 K/uL  
 RBC 4.00 (L) 4.23 - 5.6 M/uL HGB 8.3 (L) 13.6 - 17.2 g/dL HCT 28.6 (L) 41.1 - 50.3 % MCV 71.5 (L) 79.6 - 97.8 FL  
 MCH 20.8 (L) 26.1 - 32.9 PG  
 MCHC 29.0 (L) 31.4 - 35.0 g/dL  
 RDW 19.9 (H) 11.9 - 14.6 % PLATELET 874 142 - 158 K/uL MPV 11.1 9.4 - 12.3 FL ABSOLUTE NRBC 0.04 0.0 - 0.2 K/uL  
 DF AUTOMATED NEUTROPHILS 77 43 - 78 % LYMPHOCYTES 11 (L) 13 - 44 % MONOCYTES 10 4.0 - 12.0 % EOSINOPHILS 1 0.5 - 7.8 % BASOPHILS 1 0.0 - 2.0 % IMMATURE GRANULOCYTES 1 0.0 - 5.0 %  
 ABS. NEUTROPHILS 8.1 1.7 - 8.2 K/UL ABS. LYMPHOCYTES 1.2 0.5 - 4.6 K/UL  
 ABS. MONOCYTES 1.0 0.1 - 1.3 K/UL  
 ABS. EOSINOPHILS 0.1 0.0 - 0.8 K/UL  
 ABS. BASOPHILS 0.1 0.0 - 0.2 K/UL  
 ABS. IMM. GRANS. 0.1 0.0 - 0.5 K/UL METABOLIC PANEL, BASIC Collection Time: 06/26/19  4:01 AM  
Result Value Ref Range Sodium 140 136 - 145 mmol/L Potassium 4.1 3.5 - 5.1 mmol/L Chloride 106 98 - 107 mmol/L  
 CO2 25 21 - 32 mmol/L Anion gap 9 7 - 16 mmol/L Glucose 194 (H) 65 - 100 mg/dL BUN 72 (H) 8 - 23 MG/DL Creatinine 1.87 (H) 0.8 - 1.5 MG/DL  
 GFR est AA 46 (L) >60 ml/min/1.73m2 GFR est non-AA 38 (L) >60 ml/min/1.73m2 Calcium 8.4 8.3 - 10.4 MG/DL PROTHROMBIN TIME + INR Collection Time: 06/26/19  4:01 AM  
Result Value Ref Range Prothrombin time 25.8 (H) 11.7 - 14.5 sec INR 2.4 GLUCOSE, POC Collection Time: 06/26/19  7:36 AM  
Result Value Ref Range Glucose (POC) 215 (H) 65 - 100 mg/dL GLUCOSE, POC Collection Time: 06/26/19 11:54 AM  
Result Value Ref Range Glucose (POC) 234 (H) 65 - 100 mg/dL No Known Allergies Current Facility-Administered Medications Medication Dose Route Frequency Provider Last Rate Last Dose  acetaminophen (TYLENOL) tablet 650 mg  650 mg Oral Q4H PRN Lizama Pall, DO      
 HYDROcodone-acetaminophen [facility]) 5-325 mg per tablet 1 Tab  1 Tab Oral Q4H PRN Lizama Pall, DO   1 Tab at 06/26/19 0355  
 naloxone (NARCAN) injection 0.4 mg  0.4 mg IntraVENous PRN Lizama Pall, DO      
 ondansetron [facility]) injection 4 mg  4 mg IntraVENous Q4H PRN Lizama Pall, DO      
 morphine injection 1 mg  1 mg IntraVENous Q3H PRN Lizama Pall, DO   1 mg at 06/26/19 1028  
 0.9% sodium chloride infusion  75 mL/hr IntraVENous CONTINUOUS Lizama Pall, DO 75 mL/hr at 06/26/19 0903 75 mL/hr at 06/26/19 8285  albuterol (PROVENTIL VENTOLIN) nebulizer solution 2.5 mg  2.5 mg Nebulization Q6H RT Lizama Pall, DO   2.5 mg at 06/26/19 4504  insulin lispro (HUMALOG) injection   SubCUTAneous AC&HS Oralia Gone, DO   6 Units at 19 1158  alcohol 62% (NOZIN) nasal  1 Ampule  1 Ampule Topical Q12H Dorothy Mcnamara MD   1 Ampule at 19 4987 Review of Systems unable to obtain PHYSICAL EXAM  
 
Visit Vitals /50 (BP 1 Location: Right arm, BP Patient Position: At rest) Pulse 60 Temp 98.3 °F (36.8 °C) Resp 20 Wt 154 kg (339 lb 6.4 oz) SpO2 93% BMI 47.34 kg/m² Temp (24hrs), Av °F (36.7 °C), Min:97.7 °F (36.5 °C), Max:98.4 °F (36.9 °C) Oxygen Therapy O2 Sat (%): 93 % (19 1028) Pulse via Oximetry: 76 beats per minute (19 7275) O2 Device: Nasal cannula (19 4440) O2 Flow Rate (L/min): 3 l/min (19 6115) Intake/Output Summary (Last 24 hours) at 2019 1300 Last data filed at 2019 7680 Gross per 24 hour Intake 360 ml Output 3224 ml Net -2864 ml General: No acute distress   
Lungs: CTA bilaterally. Resp even and nonlabored Heart:  S1S2 present without murmurs rubs gallops. RRR. Chronic LE edema Abdomen: Soft, non tender, non distended. BS present Extremities: LUE in sling. 2+ radial pulses bilaterally. Bilateral LE in wraps. No cyanosis Neurologic:  Alert, confused. Follows commands. Results summary of Diagnostic Studies/Procedures copied from within [facility] EMR: 
 
 
ASSESSMENT Active Hospital Problems Diagnosis Date Noted  Acute metabolic encephalopathy   Humeral fracture 2019  UZMA (acute kidney injury) ([facility] 2019  Metabolic encephalopathy   Left humeral fracture 2019  JASON (obstructive sleep apnea) 2016  S/P CABG (coronary artery bypass graft) Cath  stable  Peripheral vascular disease; arterial ([address]  Atrial flutter ([facility] 2016  Diabetes mellitus type 2, controlled ([facility] 12/15/2015  Essential hypertension, benign 09/15/2015  Mixed hyperlipidemia 09/15/2015  Chronic systolic heart failure ([facility] 10/20/2010  CAD (coronary artery disease)--cath in 2008 noted patent grafts, EF 35-40% 10/18/2010 [facility] 
  
 CKD (chronic kidney disease) stage 3, GFR 30-59 ml/min ([facility]) 10/18/2010  Morbid obesity ([facility] 10/18/2010 Plan: 
Acute metabolic encephalopathy UA neg for infection Wife reports chronic LE wounds do not look any different, wound care consulted. Suspecting AMS from taking his Ambien along with hydrocodone.  CXR without acute findings PCT normal 
  
Left humeral fracture Ortho following, deemed non surgical.  Ortho ordering brace Consult PT/OT.  
  
UZMA Creatinine back to baseline Continue IVF 
 
CAD Resume home meds given pt is not a surgical candidate HTN Controlled Restart selected anti-hypertensives  
  
Atrial flutter/fibrilltation Stop heparin gtt, pt INR is still therapeutic Will have pharmacy to dose coumadin 
  
DM type II  
SSI 
A1C 7.6  
  
Lower back pain Xray lumber/thoracic spine neg for fx 
  
Chronic leg wounds Consult wound care Notes, labs, VS, diagnostic testing reviewed Time spent with pt 20 min DVT Prophylaxis: coumadin Plan of Care Discussed with: Supervising MD  Dr. Jacquelyn Walton, care team 
 
 
 
Eric Peterson NP

## 2019-06-26 NOTE — CONSULTS
[facility] 
CONSULTATION Name:  Nabila Kebede 
MR#:  [id number] :  1950 ACCOUNT #:  [de-identified] DATE OF SERVICE:  2019 REASON FOR CONSULTATION:  Left humerus fracture. HISTORY OF PRESENT ILLNESS:  I was asked to see this 64-year-old gentleman for an injury to his left humerus. He was in his normal state of health until yesterday when he presented to the emergency room with multiple falls and confusion. He has multiple chronic medical problems including extreme obesity, atrial flutter on warfarin, chronic kidney disease, diabetes mellitus, peripheral arterial disease, coronary artery disease status post CABG, and chronic bilateral lower extremity ulcerations. He was found to have a humerus fracture. He is admitted to the Hospitalist Service. He takes Ambien chronically and recently began taking hydrocodone for some right-sided mid back pain. The patient states that the patient has been confused, slow and lethargic, which is atypical for him. On presentation, his O2 saturation was 85% on room air. He was placed in a sling for comfort in the emergency room and admitted for his medical problems. The patient is a poor historian and his wife provides the majority of the history. PAST MEDICAL HISTORY:  Atrial flutter, coronary artery disease, cardiomyopathy, chronic heart failure, chronic kidney disease, diabetic peripheral neuropathy, dyslipidemia, edema, essential hypertension, insomnia with sleep apnea using CPAP, hyperlipidemia, morbid obesity, nephrolithiasis, peripheral neuropathy, renal insufficiency, history of CABG, type 2 diabetes mellitus and extreme obesity. MEDICATIONS PRIOR TO ADMISSION:  Reviewed and include Norco, metoprolol, Lyrica, Ambien, Lipitor, Coumadin, vitamin D, Astelin, Optivar, Humulin, Apidra insulin, potassium chloride, Bumex, Cymbalta, zolpidem, Allegra-D 24, aspirin, MiraLax, nitroglycerin as needed, Flonase. SURGERIES:  Noncontributory except as noted above. SOCIAL HISTORY:  Former smoker, quit in [facility]. Nondrinker. He is . FAMILY HISTORY:  Noncontributory except as noted above. REVIEW OF SYSTEMS:  Compromised by the patient's current mental status, but he mostly complains of right-sided mid back pain. He states he does not have significant pain in his left arm. PHYSICAL EXAMINATION:  Demonstrates a somewhat confused, slightly agitated, morbidly obese gentleman, in no acute distress. He is in a left upper extremity sling. His body habitus precludes optimum determination of alignment. He has tenderness to palpation over the midshaft humerus. His elbow, forearm, wrist and hand are nontender. His radial nerve is intact to sensation and motor testing. Compartments are soft and nontender. No acute skin changes. He has diffuse right-sided thoracic tenderness to palpation with no tenderness in the midline. RADIOGRAPHS:  Two views of the left humerus obtained on 06/25/2019 are reviewed and demonstrate a transverse midshaft humerus fracture. IMPRESSION:  Midshaft left humerus fracture. I discussed the nature of this injury as well as treatment options. My recommendation for nonoperative management was a fracture brace. I think that he is at a significant risk of varus deformity due to his habitus. That having been said, I think he would accommodate that deformity quite well. I also think his medical status precludes consideration of any surgical intervention currently. I have ordered an Orthotics consult for a humeral fracture brace. He is nonweightbearing left upper extremity. I will follow him loosely during his hospitalization and follow up as an outpatient after his discharge. Kavitha Juares MD 
 
 
[id number] 
D:  06/26/2019 11:16 
T:  06/26/2019 11:22 
JOB #:  [id number] CC:   Jil Jo MD

## 2019-06-26 NOTE — PROGRESS NOTES
Physical Therapy Note: 
 
Per chart review, patient has a displaced L humerus fracture and is pending orthopedic surgery consult. Will follow up for evaluation per ortho recommendations. Thank you, Bradly Sarmiento, PT, DPT

## 2019-06-26 NOTE — PROGRESS NOTES
Problem: Diabetes Self-Management Goal: *Disease process and treatment process Description Define diabetes and identify own type of diabetes; list 3 options for treating diabetes. Outcome: Progressing Towards Goal 
Goal: *Incorporating nutritional management into lifestyle Description Describe effect of type, amount and timing of food on blood glucose; list 3 methods for planning meals. Outcome: Progressing Towards Goal 
Goal: *Incorporating physical activity into lifestyle Description State effect of exercise on blood glucose levels. Outcome: Progressing Towards Goal 
Goal: *Developing strategies to promote health/change behavior Description Define the ABC's of diabetes; identify appropriate screenings, schedule and personal plan for screenings. Outcome: Progressing Towards Goal 
Goal: *Using medications safely Description State effect of diabetes medications on diabetes; name diabetes medication taking, action and side effects. Outcome: Progressing Towards Goal 
Goal: *Monitoring blood glucose, interpreting and using results Description Identify recommended blood glucose targets  and personal targets. Outcome: Progressing Towards Goal 
Goal: *Prevention, detection, treatment of acute complications Description List symptoms of hyper- and hypoglycemia; describe how to treat low blood sugar and actions for lowering  high blood glucose level. Outcome: Progressing Towards Goal 
Goal: *Prevention, detection and treatment of chronic complications Description Define the natural course of diabetes and describe the relationship of blood glucose levels to long term complications of diabetes. Outcome: Progressing Towards Goal 
Goal: *Developing strategies to address psychosocial issues Description Describe feelings about living with diabetes; identify support needed and support network Outcome: Progressing Towards Goal 
Goal: *Insulin pump training Outcome: Progressing Towards Goal 
 Goal: *Sick day guidelines Outcome: Progressing Towards Goal 
Goal: *Patient Specific Goal (EDIT GOAL, INSERT TEXT) Outcome: Progressing Towards Goal 
  
Problem: Patient Education: Go to Patient Education Activity Goal: Patient/Family Education Outcome: Progressing Towards Goal 
  
Problem: Altered Thought Process (Adult/Pediatric) Goal: *STG: Participates in treatment plan Outcome: Progressing Towards Goal 
Goal: *STG: Remains safe in hospital 
Outcome: Progressing Towards Goal 
Goal: *STG: Seeks staff when feelings of anxiety and fear arise Outcome: Progressing Towards Goal 
Goal: *STG: Complies with medication therapy Outcome: Progressing Towards Goal 
Goal: *STG: Attends activities and groups Outcome: Progressing Towards Goal 
Goal: *STG: Decreased delusional thinking Outcome: Progressing Towards Goal 
Goal: *STG: Decreased hallucinations Outcome: Progressing Towards Goal 
Goal: *STG: Absence of lethality Outcome: Progressing Towards Goal 
Goal: *STG: Demonstrates ability to understand and use improved judgment in daily activities and relationships Outcome: Progressing Towards Goal 
Goal: *LTG: Returns to baseline functioning Outcome: Progressing Towards Goal 
Goal: Interventions Outcome: Progressing Towards Goal 
  
Problem: Patient Education: Go to Patient Education Activity Goal: Patient/Family Education Outcome: Progressing Towards Goal 
  
Problem: Falls - Risk of 
Goal: *Absence of Falls Description Document Juanita Miranda Fall Risk and appropriate interventions in the flowsheet. Outcome: Progressing Towards Goal 
  
Problem: Patient Education: Go to Patient Education Activity Goal: Patient/Family Education Outcome: Progressing Towards Goal

## 2019-06-26 NOTE — WOUND CARE
Attempt to see patient for chronic leg wounds. Currently patient is having upper right back pain that is severe, he is not agreeable to any attempt to discuss legs. Spouse in room, states he normally has the bandages changed 3 times per week, will hold today until patient is more agreeable to discuss treatment options. Offered sergey bed for comfort as patient is too tall for the floor bed he is currently in and is obese, patient is agreeable to changing beds when he is off the floor for surgery. Nurse updated patient needs something for pain and to change beds while patient is off floor for surgery. Will attempt to revisit today for leg bandage changes.

## 2019-06-26 NOTE — PHYSICIAN ADVISORY
Letter of Determination:  Outpatient status receiving Observation Services This patient was originally hospitalized as Inpatient Status on 6/25/2019 for acute mental status changes. At this time this patient does not appear to meet the medical necessity requirements to support an inpatient level of care. It is our recommendation that this patient's hospitalization status should be changed from INPATIENT to [place] receiving OBSERVATION services.  
  
This may change due to the medical condition of the patient and new clinical evidence as the patients care progreses. The final decision regarding the patient's hospitalization status depends on the attending physician's judgement. Rakan Paige MD, TEGAN, Physician Advisor [address] [place] [address].

## 2019-06-26 NOTE — WOUND CARE
Revisit to patient after pain meds and after finding out no surgery is needed. Patient continues to refuse dressing changes today. Will put on list for Friday should patient not discharger prior. Notified case management that home health and wound clinic should be resumed at discharge for chronic leg wounds. Notified Alex Gonzalez NP of patients refusal today. Supplies were ordered and taken to room.

## 2019-06-26 NOTE — PROGRESS NOTES
Warfarin dosing per pharmacist 
 
Felix Josue is a 68 y.o. male. Weight: 154 kg (339 lb 6.4 oz) Indication:  A fib Goal INR:  2-3 Home dose:  5 mg (5 mg x 1) every Mon, Wed, Fri; 2.5 mg (5 mg x 0.5) all other days Risk factors or significant drug interactions:  Allopurinol Other anticoagulants:  Heparin drip stopped 6/26 Daily Monitoring Date  INR     Warfarin dose HGB              Notes 6/26  2.4  5 mg  8.3 Pharmacy consulted to assist in dosing patient's warfarin. Patient was started on a heparin drip upon admission with therapeutic INR. INR today within therapeutic range and heparin was subsequently discontinued. Patient did not take warfarin dose last evening. Will resume patient home regimen and give 5 mg tonight. Daily INR ordered. Thank you, Joss Hernandez, PharmD Clinical Pharmacist 
[phone]

## 2019-06-26 NOTE — PROGRESS NOTES
Problem: Self Care Deficits Care Plan (Adult) Goal: *Acute Goals and Plan of Care (Insert Text) Description 1. Pt will complete UB bathing and dressing with min A using adaptive techniques/ AE PRN 2. Pt will complete bed mobility with min A in prep for ADLs 3. Pt will complete functional transfers for ADLs with mod A 4. Pt will maintain sitting balance for ADls with SBA 5. Pt will complete grooming and hygiene with set up 6. Pt will demonstrate independence with HEP to promote improved L elbow, wrist, and hand strength and ROM for ADLs 7. Pt will toilet with mod A Timeframe: 7 days Outcome: Progressing Towards Goal 
  
OCCUPATIONAL THERAPY: Initial Assessment and Daily Note 6/26/2019 OBSERVATION:   
Payor: Baron Gardner / Plan: [employer] HMO / Product Type: HMO /  
  
NAME/AGE/GENDER: Jael Greer is a 68 y.o. male PRIMARY DIAGNOSIS:  UZMA (acute kidney injury) ([facility] [address] [N17.9] Metabolic encephalopathy [Y58.19] Left humeral fracture [S42.302A] UZMA (acute kidney injury) ([facility] [address] [N17.9] Acute metabolic encephalopathy [Y24.23] Acute metabolic encephalopathy Acute metabolic encephalopathy ICD-10: Treatment Diagnosis:  
 History of falling (Z91.81) Precautions/Allergies: 
  Falls, LUE NWB, sling Patient has no known allergies. ASSESSMENT:  
Mr. Juan Jose Key was admitted with AMS, multiple recent falls, had a recent admission for back pain. Pt landed on L side during one fall, workup + displaced L humerus fracture. Per ortho consult, LUE NWB and no surgical intervention is planned. Pt lives with his wife and requires assistance for bathing and bed mobility, is able to dress and toilet w/o assistance. Pt uses RW for mobility, has had 3 falls within the last week. This session, pt presented with deficits in mobility, balance, cognition, strength, and endurance impacting ADLs.  Pt very limited by pain, endorsed 10/10 pain LUE,  back, and LLE, acute on chronic. Pt A&O X4, however confused during conversation, agitated throughout session with limited insight and impaired attention, problem solving, and safety awareness. LUE immobilized in sling; elbow, wrist, and hand ROM generally decreased d/t pain. RUE assessment WNL. Pt required max-total A for bed mobility, max A to scoot to the edge, min A for sitting balance. Pt blew nose and completed simple grooming and hygiene with CGA, currently requires max-total A for all bathing, dressing, and toileting. Pt with self limiting behaviors and continually stated \"I can't\" to all requested tasks, required max encouragement to even attempt blowing nose. Pt returned to bed with total A X2-3. Pt is below his functional baseline and would benefit from skilled OT services to address deficits. Rec d/c to SNF. This section established at most recent assessment PROBLEM LIST (Impairments causing functional limitations): 
Decreased Strength Decreased ADL/Functional Activities Decreased Transfer Abilities Decreased Balance Increased Pain Decreased Activity Tolerance Decreased Cognition INTERVENTIONS PLANNED: (Benefits and precautions of occupational therapy have been discussed with the patient.) Activities of daily living training Adaptive equipment training Balance training Cognitive training Donning&doffing training [employer] tech training Hygiene training Therapeutic activity Therapeutic exercise TREATMENT PLAN: Frequency/Duration: Follow patient 3 times/ week to address above goals. Rehabilitation Potential For Stated Goals: Fair REHAB RECOMMENDATIONS (at time of discharge pending progress):   
Placement: It is my opinion, based on this patient's performance to date, that Mr. Vladimir Jimenez may benefit from d/c to SNF. Equipment:  
None at this time OCCUPATIONAL PROFILE AND HISTORY:  
History of Present Injury/Illness (Reason for Referral): 
See H&P 
 Past Medical History/Comorbidities: Mr. Jimbo Del Rosario  has a past medical history of Atrial flutter ([facility] [address] (2/3/2016), CAD (coronary artery disease), Cardiomyopathy ([address] Chronic systolic heart failure ([facility] [address] (10/20/2010), CKD (chronic kidney disease) stage 3, GFR 30-59 ml/min ([facility] [address] (10/18/2010), Diabetic peripheral neuropathy ([address] (9/15/2015), Dyslipidemia (10/18/2010), Edema, Essential hypertension, benign (9/15/2015), Insomnia (9/15/2015), Mixed hyperlipidemia (9/15/2015), Morbid obesity ([facility] [address] (10/18/2010), Nephrolithiasis, Peripheral neuropathy, Renal insufficiency, Right heart failure ([facility] [address] S/P CABG (coronary artery bypass graft), and Type II or unspecified type diabetes mellitus without mention of complication, uncontrolled ([facility] [address] (9/15/2015). Mr. Jimbo Del Rosario  has a past surgical history that includes hx urological; hx coronary stent placement (Right); hx cataract removal; and pr cardiac surg procedure unlist. 
Social History/Living Environment:  
Home Environment: Private residence # Steps to Enter: 0 One/Two Story Residence: One story Living Alone: No 
Support Systems: Spouse/Significant Other/Partner Patient Expects to be Discharged to[de-identified] Unknown Current DME Used/Available at Home: Grab bars, Shower chair, Walker, rolling Tub or Shower Type: Tub/Shower combination Prior Level of Function/Work/Activity: 
Pt lives with his wife and requires assistance for bathing and bed mobility, is able to dress and toilet w/o assistance. Pt uses RW for mobility, has had 3 falls within the last week. Number of Personal Factors/Comorbidities that affect the Plan of Care: Extensive review of physical, cognitive, and psychosocial performance (3+):  HIGH COMPLEXITY ASSESSMENT OF OCCUPATIONAL PERFORMANCE[de-identified]  
Activities of Daily Living:  
Basic ADLs (From Assessment) Complex ADLs (From Assessment) Feeding: Minimum assistance Oral Facial Hygiene/Grooming: Minimum assistance Bathing: Total assistance Upper Body Dressing: Maximum assistance, Total assistance Lower Body Dressing: Total assistance Toileting: Total assistance Instrumental ADL Meal Preparation: Total assistance Homemaking: Adaptive equipment Medication Management: Maximum assistance Financial Management: Maximum assistance Grooming/Bathing/Dressing Activities of Daily Living Grooming Washing Face: Contact guard assistance Washing Hands: Minimum assistance Cognitive Retraining Safety/Judgement: Decreased insight into deficits Bed/Mat Mobility Supine to Sit: Maximum assistance; Total assistance;Assist x2 Sit to Supine: Maximum assistance; Total assistance;Assist x2 Scooting: Maximum assistance Most Recent Physical Functioning:  
Gross Assessment: 
  
         
  
Posture: 
  
Balance: 
Sitting: Impaired Sitting - Static: Fair (occasional) Sitting - Dynamic: Poor (constant support) Bed Mobility: 
Supine to Sit: Maximum assistance; Total assistance;Assist x2 Sit to Supine: Maximum assistance; Total assistance;Assist x2 Scooting: Maximum assistance Wheelchair Mobility: 
  
Transfers: 
   
 
    
 
Patient Vitals for the past 6 hrs: 
 BP BP Patient Position SpO2 Pulse  
06/26/19 1028 133/50 At rest 93 % 60 Mental Status Neurologic State: Alert, Confused, Irritable Orientation Level: Oriented X4 Cognition: Follows commands, Decreased attention/concentration, Poor safety awareness Perception: Appears intact Perseveration: Perseverates during conversation Safety/Judgement: Decreased insight into deficits Physical Skills Involved: 
Range of Motion Balance Strength Activity Tolerance Fine Motor Control Gross Motor Control Pain (acute) Pain (Chronic) Cognitive Skills Affected (resulting in the inability to perform in a timely and safe manner): Executive Function Sustained Attention Divided Attention Psychosocial Skills Affected: 
Habits/Routines Environmental Adaptation Emotional Regulation Self-Awareness Number of elements that affect the Plan of Care: 5+:  HIGH COMPLEXITY CLINICAL DECISION MAKING:  
[facility] AM-PAC? ?6 Clicks? Daily Activity Inpatient Short Form How much help from another person does the patient currently need. .. Total A Lot A Little None 1. Putting on and taking off regular lower body clothing? ? 1   ? 2   ? 3   ? 4  
2. Bathing (including washing, rinsing, drying)? ? 1   ? 2   ? 3   ? 4  
3. Toileting, which includes using toilet, bedpan or urinal?   ? 1   ? 2   ? 3   ? 4  
4. Putting on and taking off regular upper body clothing? ? 1   ? 2   ? 3   ? 4  
5. Taking care of personal grooming such as brushing teeth? ? 1   ? 2   ? 3   ? 4  
6. Eating meals? ? 1   ? 2   ? 3   ? 4  
© 2007, Trustees of [facility], under license to [employer]. All rights reserved Score:  Initial: 10 Most Recent: X (Date: -- ) Interpretation of Tool:  Represents activities that are increasingly more difficult (i.e. Bed mobility, Transfers, Gait). Medical Necessity:    
Patient is expected to demonstrate progress in strength, range of motion, balance, coordination and functional technique 
 to decrease assistance required with ADLs Christian Brito Reason for Services/Other Comments: 
Patient continues to require present interventions due to patient's inability to complete ADLs Christian Brito Use of outcome tool(s) and clinical judgement create a POC that gives a: HIGH COMPLEXITY  
 
 
 
TREATMENT:  
(In addition to Assessment/Re-Assessment sessions the following treatments were rendered) Pre-treatment Symptoms/Complaints:   
Pain: Initial:  
Pain Intensity 1: 10 
Pain Location 1: Arm, Back, Leg 
Pain Intervention(s) 1: Repositioned, Rest(pre-medicated)  Post Session:  10 Therapeutic Activity: (    15 min): Therapeutic activities including Bed transfers to improve mobility, balance and overall endurance for ADLs . Required moderate cues  to promote static and dynamic balance in sitting. Braces/Orthotics/Lines/Etc:  
O2 Device: Nasal cannula Treatment/Session Assessment:   
Response to Treatment:  pain Interdisciplinary Collaboration: Occupational Therapist 
Registered Nurse Certified Nursing Assistant/Patient Care Technician After treatment position/precautions:  
Supine in bed Bed/Chair-wheels locked Bed in low position Call light within reach Family at bedside Nurse at bedside Compliance with Program/Exercises: Noncompliant some of the time, Will assess as treatment progresses. Recommendations/Intent for next treatment session: \"Next visit will focus on advancements to more challenging activities and reduction in assistance provided\". Total Treatment Duration: OT Patient Time In/Time Out Time In: 6387 Time Out: 2006 Ricky Montenegro, OT

## 2019-06-26 NOTE — PROGRESS NOTES
Spiritual Care Visit, initial visit. Visited with patient and his wife at bedside. Listened as patient spoke of of events which he finds disabling. He says his arm was broken in a recent fall; He is unsure of how doctors will proceed in treating it. He has had multiple falls. He exhibits anxiety and bewilderment in response to this. Occupational therapist was at door to speak with him about helping him.  agreed to return tomorrow and spend some time listening. Prayed for patient's healing and health. Visit by [facility], Staff .  Amber., Th.B., B.A.

## 2019-06-27 NOTE — PROGRESS NOTES
Orthopedic Progress Note 2019 Admit Date: 2019 Admit Diagnosis: UZMA (acute kidney injury) ([facility] [address] [N17.9] Metabolic encephalopathy [U90.08] Left humeral fracture [S42.302A] UZMA (acute kidney injury) ([facility] [address] [N17.9] Acute metabolic encephalopathy [B32.45] Post Op day: * No surgery found * Subjective:  
 
Towanda Inola Uncomfortable. No orthotic note. Dyspneic after lying flat to move up in bed Objective:  
 
Vital Signs:   
Temp (24hrs), Av.2 °F (36.8 °C), Min:97.6 °F (36.4 °C), Max:98.7 °F (37.1 °C) LAB:   
[unfilled] Lab Results Component Value Date/Time INR 2.0 2019 03:39 AM  
 INR 2.4 2019 04:01 AM  
 
Lab Results Component Value Date/Time HGB 8.3 (L) 2019 03:39 AM  
 HGB 8.3 (L) 2019 04:01 AM  
 
 
Physical Exam: 
 
No apparent distress No neurovascular issues reported No gross problems noted L arm in sling, radial nerve intact Plan:  
 
Continue PT/OT rehab as indicated Nursing and SS for disposition plans  Ortho needs to see. IM to manage medically Signed By: Beau Villeda MD

## 2019-06-27 NOTE — PROGRESS NOTES
Progress Note 2019 Admit Date: 2019  2:33 PM  
NAME: Kameron Ceja :  1950 MRN:  [id number] Attending: Vignesh Matthews MD 
PCP:  Author Lori MD 
Treatment Team: Attending Provider: Vignesh Matthews MD; Utilization Review: Ramona Buck RN; Consulting Provider: Andriy Baker MD; Physical Therapist: Lesa Farias, PT, DPT; Care Manager: Ayesha Webb Full Code SUBJECTIVE:  
Mr. Jimbo Del Rosario is a 67 yo male with PMH of aflutter on coumadin, CAD, cardiomyopathy, chronic systolic CHF, CKD 3, diabetic peripheral neuropathy, HTN, JASON uses CPAP, DM II, chronic ulcerations of LE bilaterally who presented after multiple falls and with increased confusion. Wife reported pt had started taking ambien and hydrocodone since a fall with lower back pain last week. Pt found to have a left humeral fx. Ortho consulted, deemed non surgical.  CT head showed no acute findings. Creatinine elevated at 2.47 on admission, baseline around 1.8. Creatinine trending down today after IVF. UA without signs of infection. Wound care consulted for LE wounds. Today pt is A/O X3. Wife at bedside. Pt c/o uncontrolled pain and refusing PT. He thinks it is ideal to have total pain control before PT even if that takes months. Explained to pt we would change pain medications to try to control pain so he can work with PT. Pt very angry we are asking him to work with PT. His wife is at bedside and very pleasant, attempting to encourage pt the importance of working with PT. Ortho ordered brace from  Ortho, awaiting. Pt c/o back pain and left shoulder pain. Denies CP, SOB. Past Medical History:  
Diagnosis Date  Atrial flutter ([address] 2/3/2016  CAD (coronary artery disease)  Cardiomyopathy ([address]  Chronic systolic heart failure ([facility] 10/20/2010  CKD (chronic kidney disease) stage 3, GFR 30-59 ml/min ([facility]) 10/18/2010  Diabetic peripheral neuropathy ([facility] 9/15/2015  Dyslipidemia 10/18/2010  Edema  Essential hypertension, benign 9/15/2015  Insomnia 9/15/2015  
 sleep apnea - uses CPAP  
 Mixed hyperlipidemia 9/15/2015  Morbid obesity ([facility] 10/18/2010  Nephrolithiasis  Peripheral neuropathy  Renal insufficiency  Right heart failure (HCC)  S/P CABG (coronary artery bypass graft)  Type II or unspecified type diabetes mellitus without mention of complication, uncontrolled ([facility] 9/15/2015 Recent Results (from the past 24 hour(s)) GLUCOSE, POC Collection Time: 06/26/19  4:16 PM  
Result Value Ref Range Glucose (POC) 337 (H) 65 - 100 mg/dL GLUCOSE, POC Collection Time: 06/26/19  8:31 PM  
Result Value Ref Range Glucose (POC) 308 (H) 65 - 100 mg/dL CBC WITH AUTOMATED DIFF Collection Time: 06/27/19  3:39 AM  
Result Value Ref Range WBC 8.7 4.3 - 11.1 K/uL  
 RBC 4.00 (L) 4.23 - 5.6 M/uL HGB 8.3 (L) 13.6 - 17.2 g/dL HCT 28.4 (L) 41.1 - 50.3 % MCV 71.0 (L) 79.6 - 97.8 FL  
 MCH 20.8 (L) 26.1 - 32.9 PG  
 MCHC 29.2 (L) 31.4 - 35.0 g/dL RDW 20.4 (H) 11.9 - 14.6 % PLATELET 616 860 - 607 K/uL MPV 11.1 9.4 - 12.3 FL ABSOLUTE NRBC 0.04 0.0 - 0.2 K/uL  
 DF AUTOMATED NEUTROPHILS 74 43 - 78 % LYMPHOCYTES 14 13 - 44 % MONOCYTES 9 4.0 - 12.0 % EOSINOPHILS 2 0.5 - 7.8 % BASOPHILS 1 0.0 - 2.0 % IMMATURE GRANULOCYTES 1 0.0 - 5.0 %  
 ABS. NEUTROPHILS 6.4 1.7 - 8.2 K/UL  
 ABS. LYMPHOCYTES 1.2 0.5 - 4.6 K/UL  
 ABS. MONOCYTES 0.7 0.1 - 1.3 K/UL  
 ABS. EOSINOPHILS 0.2 0.0 - 0.8 K/UL  
 ABS. BASOPHILS 0.1 0.0 - 0.2 K/UL  
 ABS. IMM. GRANS. 0.1 0.0 - 0.5 K/UL METABOLIC PANEL, BASIC Collection Time: 06/27/19  3:39 AM  
Result Value Ref Range Sodium 140 136 - 145 mmol/L Potassium 3.6 3.5 - 5.1 mmol/L Chloride 106 98 - 107 mmol/L  
 CO2 27 21 - 32 mmol/L Anion gap 7 7 - 16 mmol/L Glucose 258 (H) 65 - 100 mg/dL BUN 54 (H) 8 - 23 MG/DL Creatinine 1.47 0.8 - 1.5 MG/DL  
 GFR est AA >60 >60 ml/min/1.73m2 GFR est non-AA 50 (L) >60 ml/min/1.73m2 Calcium 8.4 8.3 - 10.4 MG/DL PROTHROMBIN TIME + INR Collection Time: 06/27/19  3:39 AM  
Result Value Ref Range Prothrombin time 22.2 (H) 11.7 - 14.5 sec INR 2.0 GLUCOSE, POC Collection Time: 06/27/19  7:57 AM  
Result Value Ref Range Glucose (POC) 272 (H) 65 - 100 mg/dL GLUCOSE, POC Collection Time: 06/27/19 11:19 AM  
Result Value Ref Range Glucose (POC) 292 (H) 65 - 100 mg/dL No Known Allergies Current Facility-Administered Medications Medication Dose Route Frequency Provider Last Rate Last Dose  warfarin (COUMADIN) tablet 2.5 mg  2.5 mg Oral ONCE Luana Boyd MD      
 insulin glargine (LANTUS) injection 15 Units  15 Units SubCUTAneous DAILY Mariya Clas, NP   15 Units at 06/27/19 6344  lidocaine 4 % patch 2 Patch  2 Patch TransDERmal Q24H Mariya Clas, NP      
 traMADol [facility]) tablet 50 mg  50 mg Oral Q6H PRN Mariya Clas, NP      
 metaxalone [facility]) tablet 400 mg  400 mg Oral TID PRN Mariya Clas, NP      
 atorvastatin (LIPITOR) tablet 40 mg  40 mg Oral DAILY Veldon Manna B, NP   40 mg at 06/27/19 0855  
 azelastine (OPTIVAR) 0.05 % ophthalmic solution drop 1 Drop (Patient Supplied)  1 Drop Both Eyes BID PRN Mariya Clas, NP      
 DULoxetine (CYMBALTA) capsule 60 mg  60 mg Oral DAILY Veldon Manna B, NP   60 mg at 06/27/19 0557  metoprolol tartrate (LOPRESSOR) tablet 12.5 mg  12.5 mg Oral BID Veldon Manna B, NP   12.5 mg at 06/27/19 5454  polyethylene glycol (MIRALAX) packet 17 g  17 g Oral DAILY Veldon Manna B, NP   17 g at 06/27/19 8453  warfarin (COUMADIN) tablet 5 mg  5 mg Oral Once per day on Mon Wed Daren Boyd MD   5 mg at 06/26/19 7524  warfarin (COUMADIN) tablet 2.5 mg  2.5 mg Oral Once per day on Sun Tue [facility] Meredith MD      
 acetaminophen (TYLENOL) tablet 650 mg  650 mg Oral Q4H PRN Oralia Gone, DO      
 naloxone [facility]) injection 0.4 mg  0.4 mg IntraVENous PRN Oralia Gone, DO      
 [facility]ron [facility]) injection 4 mg  4 mg IntraVENous Q4H PRN Oralia Gone, DO      
 0.9% sodium chloride infusion  75 mL/hr IntraVENous CONTINUOUS Oralia Gone, DO 75 mL/hr at 19 0030 75 mL/hr at 19 0030  
 albuterol (PROVENTIL VENTOLIN) nebulizer solution 2.5 mg  2.5 mg Nebulization Q6H RT Oralia Gone, DO   2.5 mg at 19 1332  
 insulin lispro (HUMALOG) injection   SubCUTAneous AC&HS Oralia Gone, DO   6 Units at 19 1122  
 alcohol 62% (NOZIN) nasal  1 Ampule  1 Ampule Topical Q12H Dorothy Mcnamara MD   1 Ampule at 19 6086 Review of Systems negative with exception of pertinent positives noted above PHYSICAL EXAM  
 
Visit Vitals BP (!) 175/92 (BP 1 Location: Left arm, BP Patient Position: At rest) Pulse 85 Temp 97.6 °F (36.4 °C) Resp 16 Wt 154 kg (339 lb 6.4 oz) SpO2 94% BMI 47.34 kg/m² Temp (24hrs), Av.2 °F (36.8 °C), Min:97.6 °F (36.4 °C), Max:98.7 °F (37.1 °C) Oxygen Therapy O2 Sat (%): 94 % (19 1334) Pulse via Oximetry: 89 beats per minute (19 133) O2 Device: Nasal cannula (19 133) O2 Flow Rate (L/min): 3 l/min (19 133) Intake/Output Summary (Last 24 hours) at 2019 1354 Last data filed at 2019 4647 Gross per 24 hour Intake  Output 950 ml Net -950 ml General:       No acute distress, agitated   
Lungs:          CTA bilaterally. Resp even and nonlabored Heart:            S1S2 present without murmurs rubs gallops. RRR. Chronic LE edema Abdomen:    Soft, non tender, non distended. BS present Extremities: LUE in sling. 2+ radial pulses bilaterally. Bilateral LE in wraps. No cyanosis Neurologic:  Alert/Oriented X3. Results summary of Diagnostic Studies/Procedures copied from within [facility] EMR:  
 
 
ASSESSMENT Active Hospital Problems Diagnosis Date Noted  Acute metabolic encephalopathy 34/82/5462  Humeral fracture 06/25/2019  UZMA (acute kidney injury) ([address] 06/25/2019  Metabolic encephalopathy 64/30/5958  Left humeral fracture 06/25/2019  JASON (obstructive sleep apnea) 04/07/2016  S/P CABG (coronary artery bypass graft) Cath 2010 stable  Peripheral vascular disease; arterial ([address]  Atrial flutter ([address] 02/03/2016  Diabetes mellitus type 2, controlled ([address] 12/15/2015  Essential hypertension, benign 09/15/2015  Mixed hyperlipidemia 09/15/2015  Chronic systolic heart failure ([address] 10/20/2010  CAD (coronary artery disease)--cath in 2008 noted patent grafts, EF 35-40% 10/18/2010 [facility] 
  
 CKD (chronic kidney disease) stage 3, GFR 30-59 ml/min ([facility]) 10/18/2010  Morbid obesity ([address] 10/18/2010 Plan: 
Acute metabolic encephalopathy Resolved UA neg for infection Wife reports chronic LE wounds do not look any different, wound care consulted. Suspecting AMS from taking his Ambien along with hydrocodone.  CXR without acute findings PCT normal 
  
Left humeral fracture Ortho following, deemed non surgical.  Ortho ordering brace. Will need brace prior to DC 
PT/OT, pt refusing, encouraged participation 
  
UZMA Creatinine back to baseline Continue IVF 
  
CAD Resume home meds given pt is not a surgical candidate 
  
HTN Controlled Restart selected anti-hypertensives  
  
Atrial flutter/fibrilltation 6/26 Stopped heparin gtt 
 pharmacy to dose coumadin 
  
DM type II  
SSI 
A1C 7.6  
  
Lower back pain Xray lumber/thoracic spine neg for fx Add lidocaine patch Start ultram, stop oxycodone and morphine Start skelaxin 
  
Chronic leg wounds Consult wound care 
  
  
Notes, labs, VS, diagnostic testing reviewed Time spent with pt 20 min 
  
 DVT Prophylaxis: coumadin Plan of Care Discussed with: Supervising MD Dr. Celia Carlos, care team 
  
  
  
Sari Sandoval NP

## 2019-06-27 NOTE — PROGRESS NOTES
Warfarin dosing per pharmacist 
 
Doris Galicia is a 68 y.o. male. Weight: 154 kg (339 lb 6.4 oz) Indication:  A fib Goal INR:  2-3 Home dose:  5 mg (5 mg x 1) every Mon, Wed, Fri; 2.5 mg (5 mg x 0.5) all other days Risk factors or significant drug interactions:  Allopurinol Other anticoagulants:  Heparin drip stopped 6/26 Daily Monitoring Date  INR     Warfarin dose HGB              Notes 6/26  2.4  5 mg  8.3 
6/27  2  5 mg  8.3 Pharmacy consulted to assist in dosing patient's warfarin. Patient was started on a heparin drip upon admission with therapeutic INR. INR within therapeutic range and heparin was subsequently discontinued. Patient did not take warfarin on 6/26. INR down to 2. Will give 5 mg tonight and resume home regimen tomorrow. Daily INR ordered. Thank you, Lamar Lewis, PharmD Clinical Pharmacist 
[phone]

## 2019-06-27 NOTE — PROGRESS NOTES
Patient is now being recommended for STR, and he is agreeable to this plan for discharge. Patient and wife requesting referrals to [facility] and [facility]. Referrals placed in [address] on this date. Insurance precert will be needed once a bed is located. SW will continue to follow case.

## 2019-06-27 NOTE — PROGRESS NOTES
Problem: Mobility Impaired (Adult and Pediatric) Goal: *Acute Goals and Plan of Care (Insert Text) Description STG: 
(1.)Mr. Brianne Rojas will move from supine to sit and sit to supine , scoot up and down and roll side to side with MODERATE ASSIST within 3 treatment day(s). (2.)Mr. Brianne Rojas will transfer from bed to chair and chair to bed with MODERATE ASSIST using the least restrictive device within 3 treatment day(s). (3.)Mr. Brianne Rojas will ambulate with MAXIMAL ASSIST for 3 feet with the least restrictive device within 3 treatment day(s). (4.)Mr. Brianne Rojas will perform seated static and dynamic balance activities x 15 minutes with STAND BY ASSIST to improve safety within 3 day(s). (5.)Mr. Brianne Rojas will perform standing static and dynamic balance activities x 5 minutes with MAXIMAL ASSIST to improve safety within 3 day(s). (6.)Mr. Brianne Rojas will maintain NWB LUE throughout all functional mobility within 3 days. LTG: 
(1.)Mr. Brianne Rojas will move from supine to sit and sit to supine , scoot up and down and roll side to side in bed with MINIMAL ASSIST within 7 treatment day(s). (2.)Mr. Brianne Rojas will transfer from bed to chair and chair to bed with MINIMAL ASSIST using the least restrictive device within 7 treatment day(s). (3.)Mr. Brianne Rojas will ambulate with MODERATE ASSIST for 10 feet with the least restrictive device within 7 treatment day(s). (4.)Mr. Biranne Rojas will perform standing static and dynamic balance activities x 10 minutes with MODERATE ASSIST to improve safety within 7 day(s). ________________________________________________________________________________________________ Outcome: Progressing Towards Goal 
  
PHYSICAL THERAPY: Initial Assessment, Daily Note and PM 6/27/2019 OBSERVATION: PT Visit Days : 1 Payor: Janice Wyatt / Plan: [employer] HMO / Product Type: HMO /   
MANISHA REYES  
NAME/AGE/GENDER: Nancie Singh is a 68 y.o. male PRIMARY DIAGNOSIS: UZMA (acute kidney injury) ([facility] [N17.9] Metabolic encephalopathy [K19.22] Left humeral fracture [S42.302A] UZMA (acute kidney injury) ([facility] [N17.9] Acute metabolic encephalopathy [N84.74] Acute metabolic encephalopathy Acute metabolic encephalopathy ICD-10: Treatment Diagnosis:  
 Generalized Muscle Weakness (M62.81) Difficulty in walking, Not elsewhere classified (R26.2) Repeated Falls (R29.6) History of falling (Z91.81) Precaution/Allergies: 
Patient has no known allergies. ASSESSMENT:  
 
Mr. Theron Villatoro is a 68 y.o. Male admitted for UZMA, metabolic encephalopathy and L humeral fracture (non-operative). He lives with spouse in a single story home and typically ambulates without assistance ~30-50' with a rolling walker, requires assistance for ADLs and admits to 4-5 falls in the past 6 months. He was premedicated prior to physical therapy session and continues to endorse significant pain this PM, but is agreeable to physical therapy. He required additional time for all mobility this session. Transferred to sitting with maximal assist x2 and required maximal assist x2 to scoot to edge of bed. Initially requiring moderate assist x2 for sitting balance. Treatment initiated to include sitting balance activities to improve balance and tolerance. Patient sat edge of bed x15 minutes and improved to requiring stand by assist and not using RUE for prop sitting. Treatment continued to include bed mobility with maximal to total assist x2 and scooting up in bed requiring total assist. Able to pull up to sit several times to assist with repositioning with RUE. SpO2 91% post mobility on O2 and respiratory therapist notified. He is currently well below his baseline of ambulatory with a rolling walker, has exhibited several falls in the past 6 months and unable to stand at this time. Exhibits high risk of falling.  Reshma Mcginnis is currently functioning below his baseline and would benefit from skilled PT during acute care stay to maximize safety and independence with functional mobility. This section established at most recent assessment PROBLEM LIST (Impairments causing functional limitations): 
Decreased Strength Decreased ADL/Functional Activities Decreased Transfer Abilities Decreased Ambulation Ability/Technique Decreased Balance Increased Pain Decreased Activity Tolerance Decreased Pacing Skills Increased Shortness of Breath Decreased Knowledge of Precautions Decreased [place] with Home Exercise Program 
Decreased Cognition INTERVENTIONS PLANNED: (Benefits and precautions of physical therapy have been discussed with the patient.) Balance Exercise Bed Mobility Family Education Gait Training Home Exercise Program (HEP) Therapeutic Activites Therapeutic Exercise/Strengthening Transfer Training TREATMENT PLAN: Frequency/Duration: 3 times a week for duration of hospital stay Rehabilitation Potential For Stated Goals: Fair REHAB RECOMMENDATIONS (at time of discharge pending progress):   
Placement: It is my opinion, based on this patient's performance to date, that Mr. Kim Boyce may benefit from intensive therapy at a [address] [place] [address] after discharge due to the functional deficits listed above that are likely to improve with skilled rehabilitation and concerns that he/she may be unsafe to be unsupervised at home due to frequent falls, requiring significantly more assist for all mobility, unable to transfer/ambulate at this time and now NWB LUE which he utilizes for transfers and ambulation at baseline . Equipment:  
None at this time HISTORY:  
History of Present Injury/Illness (Reason for Referral): 
Patient is a 68 y.o. male who presented to the ED for cc multiple falls with increased confusion.  Hx of atrial flutter on warfarin, CKD stage III, DM type II insulin dependent, PAD, CAD s/p CABG, and chronic ulcerations of lower legs bilaterally. Wife is at bedside who gives most of the hx. No fevers at home and no worsening lesions to legs. Patient has been taking ambien along with his new prescription of hydrocodone since discharge last week for fall with low back pain. Wife states patient is slow to respond and lethargic. Vitals - HR 58. 85% on RA. Labs- WBC 11.9, Hg 8.3, MCV 71.5, Creatine 2.47 from baseline of 1.6. CT head showed no acute abnormality Left arm x ray showed left humeral fracture Past Medical History/Comorbidities: Mr. Asha Judge  has a past medical history of Atrial flutter ([address] (2/3/2016), CAD (coronary artery disease), Cardiomyopathy ([address] Chronic systolic heart failure ([address] (10/20/2010), CKD (chronic kidney disease) stage 3, GFR 30-59 ml/min ([address] (10/18/2010), Diabetic peripheral neuropathy ([address] (9/15/2015), Dyslipidemia (10/18/2010), Edema, Essential hypertension, benign (9/15/2015), Insomnia (9/15/2015), Mixed hyperlipidemia (9/15/2015), Morbid obesity ([address] (10/18/2010), Nephrolithiasis, Peripheral neuropathy, Renal insufficiency, Right heart failure ([address] S/P CABG (coronary artery bypass graft), and Type II or unspecified type diabetes mellitus without mention of complication, uncontrolled ([address] (9/15/2015). Mr. Asha Judge  has a past surgical history that includes hx urological; hx coronary stent placement (Right); hx cataract removal; and pr cardiac surg procedure unlist. 
Social History/Living Environment:  
Home Environment: Private residence # Steps to Enter: 2 One/Two Story Residence: One story Living Alone: No 
Support Systems: Spouse/Significant Other/Partner Patient Expects to be Discharged to[de-identified] Unknown Current DME Used/Available at Home: Daylene Kenneth, rolling, Walker, rollator, Wheelchair, Shower chair, Oxygen, portable Tub or Shower Type: Tub/Shower combination Prior Level of Function/Work/Activity: He lives with spouse in a single story home and typically ambulates without assistance ~30-50' with a rolling walker, requires assistance for ADLs and admits to 4-5 falls in the past 6 months. Number of Personal Factors/Comorbidities that affect the Plan of Care: 3+: HIGH COMPLEXITY EXAMINATION:  
Most Recent Physical Functioning:  
Gross Assessment: 
AROM: Generally decreased, functional 
PROM: Generally decreased, functional 
Strength: Generally decreased, functional 
         
  
Posture: 
Posture (WDL): Exceptions to [facility] Posture Assessment: Forward head, Rounded shoulders Balance: 
Sitting: Impaired Sitting - Static: Fair (occasional) Sitting - Dynamic: Fair (occasional); Prop sitting Bed Mobility: 
Supine to Sit: Maximum assistance;Assist x2 Sit to Supine: Total assistance;Assist x2 Scooting: Maximum assistance Wheelchair Mobility: 
  
Transfers: 
  
Gait: 
  
   
  
Body Structures Involved: 
Nerves Metabolic Endocrine Bones Joints Muscles Ligaments Body Functions Affected: 
Mental 
Sensory/Pain Neuromusculoskeletal 
Movement Related Metobolic/Endocrine Activities and Participation Affected: 
Learning and Applying Knowledge Mobility Self Care Domestic Life Interpersonal Interactions and Relationships Community, Social and [place]iko [place] Number of elements that affect the Plan of Care: 4+: HIGH COMPLEXITY CLINICAL PRESENTATION:  
Presentation: Evolving clinical presentation with changing clinical characteristics: MODERATE COMPLEXITY CLINICAL DECISION MAKING:  
[employer] AM-PAC? ?6 Clicks? Basic Mobility Inpatient Short Form How much difficulty does the patient currently have. .. Unable A Lot A Little None 1. Turning over in bed (including adjusting bedclothes, sheets and blankets)? ? 1   ? 2   ? 3   ? 4  
2. Sitting down on and standing up from a chair with arms ( e.g., wheelchair, bedside commode, etc.)   ?  1   ? 2   ? 3   ? 4  
 3. Moving from lying on back to sitting on the side of the bed?   ? 1   ? 2   ? 3   ? 4 How much help from another person does the patient currently need. .. Total A Lot A Little None 4. Moving to and from a bed to a chair (including a wheelchair)? ? 1   ? 2   ? 3   ? 4  
5. Need to walk in hospital room? ? 1   ? 2   ? 3   ? 4  
6. Climbing 3-5 steps with a railing? ? 1   ? 2   ? 3   ? 4  
© 2007, Trustees of [address] [place] [address] [address] [address], under license to [employer]. All rights reserved Score:  Initial: 8 Most Recent: X (Date: -- ) Interpretation of Tool:  Represents activities that are increasingly more difficult (i.e. Bed mobility, Transfers, Gait). Medical Necessity:    
Patient demonstrates fair 
 rehab potential due to higher previous functional level. Reason for Services/Other Comments: 
Patient continues to require modification of therapeutic interventions to increase complexity of exercises Nataliya Light Use of outcome tool(s) and clinical judgement create a POC that gives a: Questionable prediction of patient's progress: MODERATE COMPLEXITY  
  
 
 
 
TREATMENT:  
(In addition to Assessment/Re-Assessment sessions the following treatments were rendered) Pre-treatment Symptoms/Complaints:  L UE pain, premedicated prior to therapy. Pain: Initial:  
Pain Intensity 1: 10  Post Session:  10/10 Therapeutic Activity: (    23 minutes): Therapeutic activities including Bed transfers and sitting balance/activity tolerance activities at edge of bed  to improve mobility, strength, balance and coordination. Required moderate verbal/manual cues   to promote static and dynamic balance in sitting. Braces/Orthotics/Lines/Etc:  
IV 
O2 Device: Nasal cannula Treatment/Session Assessment:   
Response to Treatment:  Patient experienced desaturation to 91% on O2 during therapy. Interdisciplinary Collaboration:  
Physical Therapist 
Registered Nurse Rehabilitation Attendant Respiratory Therapist 
 After treatment position/precautions:  
Supine in bed Bed/Chair-wheels locked Bed in low position Call light within reach RN notified Family at bedside Respiratory therapist at bedside Compliance with Program/Exercises: Will assess as treatment progresses Recommendations/Intent for next treatment session: \"Next visit will focus on advancements to more challenging activities and reduction in assistance provided\". Total Treatment Duration: PT Patient Time In/Time Out Time In: 1300 Time Out: 1340 Bradly Sarmiento PT, DPT

## 2019-06-27 NOTE — PROGRESS NOTES
Problem: Diabetes Self-Management Goal: *Disease process and treatment process Description Define diabetes and identify own type of diabetes; list 3 options for treating diabetes. Outcome: Progressing Towards Goal 
Goal: *Incorporating nutritional management into lifestyle Description Describe effect of type, amount and timing of food on blood glucose; list 3 methods for planning meals. Outcome: Progressing Towards Goal 
Goal: *Incorporating physical activity into lifestyle Description State effect of exercise on blood glucose levels. Outcome: Progressing Towards Goal 
Goal: *Developing strategies to promote health/change behavior Description Define the ABC's of diabetes; identify appropriate screenings, schedule and personal plan for screenings. Outcome: Progressing Towards Goal 
Goal: *Using medications safely Description State effect of diabetes medications on diabetes; name diabetes medication taking, action and side effects. Outcome: Progressing Towards Goal 
Goal: *Monitoring blood glucose, interpreting and using results Description Identify recommended blood glucose targets  and personal targets. Outcome: Progressing Towards Goal 
Goal: *Prevention, detection, treatment of acute complications Description List symptoms of hyper- and hypoglycemia; describe how to treat low blood sugar and actions for lowering  high blood glucose level. Outcome: Progressing Towards Goal 
Goal: *Prevention, detection and treatment of chronic complications Description Define the natural course of diabetes and describe the relationship of blood glucose levels to long term complications of diabetes. Outcome: Progressing Towards Goal 
Goal: *Developing strategies to address psychosocial issues Description Describe feelings about living with diabetes; identify support needed and support network Outcome: Progressing Towards Goal 
  
Problem: Altered Thought Process (Adult/Pediatric) Goal: *STG: Participates in treatment plan Outcome: Progressing Towards Goal 
Goal: *STG: Remains safe in hospital 
Outcome: Progressing Towards Goal 
Goal: *STG: Seeks staff when feelings of anxiety and fear arise Outcome: Progressing Towards Goal 
Goal: *STG: Complies with medication therapy Outcome: Progressing Towards Goal 
Goal: *STG: Attends activities and groups Outcome: Progressing Towards Goal 
Goal: *STG: Decreased delusional thinking Outcome: Progressing Towards Goal 
Goal: *STG: Decreased hallucinations Outcome: Progressing Towards Goal 
Goal: *STG: Absence of lethality Outcome: Progressing Towards Goal 
Goal: *STG: Demonstrates ability to understand and use improved judgment in daily activities and relationships Outcome: Progressing Towards Goal 
  
Problem: Falls - Risk of 
Goal: *Absence of Falls Description Document [facility] Fall Risk and appropriate interventions in the flowsheet.  
Outcome: Progressing Towards Goal

## 2019-06-27 NOTE — PROGRESS NOTES
Beds offered at [facility]. Patient and wife notified. Awaiting their decision to accept bed offer at their preferred facility. Anticipate decision by 6-28-19 early morning.

## 2019-06-27 NOTE — PROGRESS NOTES
Physical Therapy Note: Attempted initial evaluation this AM, patient currently with uncontrolled pain and discussing with NP. Making changes in pain medications and will attempt this PM as patient is agreeable and schedule permits. Discussed with NP and RN. Thank you, Gorge Marmolejo, PT, DPT

## 2019-06-27 NOTE — PROGRESS NOTES
Spiritual Care Visit, Follow up visit. Patient was receiving other care. Khadra Garvey Visit by Tanesha Lima, Staff .  Amber., Emil.B., B.A.

## 2019-06-28 NOTE — PROGRESS NOTES
Patient's insurance authorization for STR at [facility] has approved. Anticipate DC to STR tomorrow if medically ready.

## 2019-06-28 NOTE — PROGRESS NOTES
Problem: Diabetes Self-Management Goal: *Disease process and treatment process Description Define diabetes and identify own type of diabetes; list 3 options for treating diabetes. Outcome: Progressing Towards Goal 
Goal: *Incorporating nutritional management into lifestyle Description Describe effect of type, amount and timing of food on blood glucose; list 3 methods for planning meals. Outcome: Progressing Towards Goal 
Goal: *Incorporating physical activity into lifestyle Description State effect of exercise on blood glucose levels. Outcome: Progressing Towards Goal 
Goal: *Developing strategies to promote health/change behavior Description Define the ABC's of diabetes; identify appropriate screenings, schedule and personal plan for screenings. Outcome: Progressing Towards Goal 
Goal: *Using medications safely Description State effect of diabetes medications on diabetes; name diabetes medication taking, action and side effects. Outcome: Progressing Towards Goal 
Goal: *Monitoring blood glucose, interpreting and using results Description Identify recommended blood glucose targets  and personal targets. Outcome: Progressing Towards Goal 
Goal: *Prevention, detection, treatment of acute complications Description List symptoms of hyper- and hypoglycemia; describe how to treat low blood sugar and actions for lowering  high blood glucose level. Outcome: Progressing Towards Goal 
Goal: *Prevention, detection and treatment of chronic complications Description Define the natural course of diabetes and describe the relationship of blood glucose levels to long term complications of diabetes. Outcome: Progressing Towards Goal 
Goal: *Developing strategies to address psychosocial issues Description Describe feelings about living with diabetes; identify support needed and support network Outcome: Progressing Towards Goal 
Goal: *Insulin pump training Outcome: Progressing Towards Goal 
 Goal: *Sick day guidelines Outcome: Progressing Towards Goal 
Goal: *Patient Specific Goal (EDIT GOAL, INSERT TEXT) Outcome: Progressing Towards Goal 
  
Problem: Patient Education: Go to Patient Education Activity Goal: Patient/Family Education Outcome: Progressing Towards Goal 
  
Problem: Altered Thought Process (Adult/Pediatric) Goal: *STG: Participates in treatment plan Outcome: Progressing Towards Goal 
Goal: *STG: Remains safe in hospital 
Outcome: Progressing Towards Goal 
Goal: *STG: Seeks staff when feelings of anxiety and fear arise Outcome: Progressing Towards Goal 
Goal: *STG: Complies with medication therapy Outcome: Progressing Towards Goal 
Goal: *STG: Attends activities and groups Outcome: Progressing Towards Goal 
Goal: *STG: Decreased delusional thinking Outcome: Progressing Towards Goal 
Goal: *STG: Decreased hallucinations Outcome: Progressing Towards Goal 
Goal: *STG: Absence of lethality Outcome: Progressing Towards Goal 
Goal: *STG: Demonstrates ability to understand and use improved judgment in daily activities and relationships Outcome: Progressing Towards Goal 
Goal: *LTG: Returns to baseline functioning Outcome: Progressing Towards Goal 
Goal: Interventions Outcome: Progressing Towards Goal 
  
Problem: Patient Education: Go to Patient Education Activity Goal: Patient/Family Education Outcome: Progressing Towards Goal 
  
Problem: Falls - Risk of 
Goal: *Absence of Falls Description Document Tessa Rider Fall Risk and appropriate interventions in the flowsheet. Outcome: Progressing Towards Goal 
  
Problem: Patient Education: Go to Patient Education Activity Goal: Patient/Family Education Outcome: Progressing Towards Goal 
  
Problem: Patient Education: Go to Patient Education Activity Goal: Patient/Family Education Outcome: Progressing Towards Goal 
  
Problem: Breathing Pattern - Ineffective Goal: *Absence of hypoxia Outcome: Progressing Towards Goal 
 Goal: *Use of effective breathing techniques Outcome: Progressing Towards Goal 
Goal: *PALLIATIVE CARE:  Alleviation of Dyspnea Outcome: Progressing Towards Goal 
  
Problem: Pressure Injury - Risk of 
Goal: *Prevention of pressure injury Description Document Jose Scale and appropriate interventions in the flowsheet. Outcome: Progressing Towards Goal 
  
Problem: Patient Education: Go to Patient Education Activity Goal: Patient/Family Education Outcome: Progressing Towards Goal 
  
Problem: Patient Education: Go to Patient Education Activity Goal: Patient/Family Education Outcome: Progressing Towards Goal

## 2019-06-28 NOTE — WOUND CARE
Pt seen for BLE wraps. Removed BLE wraps. Cleansed BLE with hibclens and normal saline. Noted multiple open wounds, partial thickness,  to medial and lateral aspect of lower extremity. And one partial thickness wound on anterior aspect of LLE. Pt stated that his leg wraps have not been changed since Monday. Pt refused to have it changed on Wednesday. Applied lotion to BLE. Covered each wound with aquacel AG and ABD pads. Applied Unna boot wrap, kerlix and coban. Pt tolerated well. Would recommend dressing changes to be done by wound care nurse MWF. Wound team will continue to follow.

## 2019-06-28 NOTE — PROGRESS NOTES
Interdisciplinary team rounds were held 6/28/2019 with the following team members:Care Management, Physical Therapy and . Anticipate discharge To [facility] tomorrow pending insurance approval and improved hypoxia. Plan of care discussed. See clinical pathway and/or care plan for interventions and desired outcomes.

## 2019-06-28 NOTE — CONSULTS
Urology Consult Subjective:  
 
Date of Consultation:  June 28, 2019 Referring Physician: Darshan Girard MD 
 
Reason for Consultation:  Difficult de la cruz placement History of Present Illness:  
 
Nupur Desouza is a 68 y.o.  male who is being seen for difficult de la cruz placement. He was admitted to the hospital for UZMA (acute kidney injury) ([address] [N17.9] Metabolic encephalopathy [J55.01] Left humeral fracture [S42.302A] UZMA (acute kidney injury) ([address] [N17.9] Acute metabolic encephalopathy [N60.06]. De La Cruz needs placed for accurate I&Os. Pt currently has soaked brief with clear urine. Pt is obese. With help from RN, penis was visualized. Unable to insert 16F de la cruz due to distal urethral stricture. Dilated urethra with syringe tip and then placed 16F coude catheter with some difficulty. Immediate return of clear yellow urine. Past Medical History:  
Diagnosis Date  Atrial flutter ([address] 2/3/2016  CAD (coronary artery disease)  Cardiomyopathy ([address]  Chronic systolic heart failure ([address] 10/20/2010  CKD (chronic kidney disease) stage 3, GFR 30-59 ml/min ([facility]) 10/18/2010  Diabetic peripheral neuropathy ([address] 9/15/2015  Dyslipidemia 10/18/2010  Edema  Essential hypertension, benign 9/15/2015  Insomnia 9/15/2015  
 sleep apnea - uses CPAP  
 Mixed hyperlipidemia 9/15/2015  Morbid obesity ([address] 10/18/2010  Nephrolithiasis  Peripheral neuropathy  Renal insufficiency  Right heart failure ([facility])  S/P CABG (coronary artery bypass graft)  Type II or unspecified type diabetes mellitus without mention of complication, uncontrolled ([address] 9/15/2015 Past Surgical History:  
Procedure Laterality Date  CARDIAC SURG PROCEDURE UNLIST By-pass x 5  
 HX CATARACT REMOVAL    
 HX CORONARY STENT PLACEMENT Right   
 femoral artery stent  HX UROLOGICAL Kidney stone removal  
  
Family History Problem Relation Age of Onset  No Known Problems Mother  Coronary Artery Disease Father  No Known Problems Sister Social History Tobacco Use  Smoking status: Former Smoker Last attempt to quit: 1986 Years since quittin.5  Smokeless tobacco: Never Used Substance Use Topics  Alcohol use: No  
  Comment: quit No Known Allergies Prior to Admission medications Medication Sig Start Date End Date Taking? Authorizing Provider  
allopurinol (ZYLOPRIM) 100 mg tablet Take 100 mg by mouth daily. Yes Provider, Historical  
HYDROcodone-acetaminophen (NORCO) 7.5-325 mg per tablet Take 1 Tab by mouth every six (6) hours as needed for Pain. Yes Provider, Historical  
warfarin (COUMADIN) 5 mg tablet Take 2.5 mg by mouth every Tuesday, Thursday, Saturday & . Takes 5 mg on Monday , Wednesday and Friday   Yes Provider, Historical  
warfarin (COUMADIN) 5 mg tablet Take 5 mg by mouth every Monday, Wednesday, Friday. Takes 2.5 mg all other days   Yes Provider, Historical  
insulin glulisine U-100 (APIDRA U-100 INSULIN) 100 unit/mL injection by SubCUTAneous route Before breakfast, lunch, and dinner. 100 units daily or adjusts according to glucose per sliding scale   Yes Provider, Historical  
metoprolol tartrate (LOPRESSOR) 25 mg tablet Take 0.5 Tabs by mouth two (2) times a day. 19   Orville Oakley MD  
pregabalin (LYRICA) 300 mg capsule 1 bid Patient taking differently: Take 300 mg by mouth two (2) times a day. Indications: Diabetic Complication causing Injury to some Body Nerves 19   Andrew Arango MD  
zolpidem (AMBIEN) 10 mg tablet /2 to 1 qhs 
Patient taking differently: Take  by mouth nightly as needed. 2 to 1 qhs  Indications: Difficulty Falling Asleep 19   Andrew Arango MD  
atorvastatin (LIPITOR) 40 mg tablet 1 every day for cholesterol (replaces simvastatin) Patient taking differently: Take 40 mg by mouth daily.  Indications: combined high blood cholesterol and triglyceride level 2/1/19   Debora Ernandez MD  
VITAMIN D2 50,000 unit capsule 1 qweek for Vitamin D replacement Patient taking differently: Take 50,000 Units by mouth every Tuesday. 6/11/18   Debora Ernandez MD  
azelastine (ASTELIN) 137 mcg (0.1 %) nasal spray Use in each nostril bid Patient taking differently: 2 Sprays by Both Nostrils route two (2) times a day. 6/1/18   Debora Ernandez MD  
azelastine (OPTIVAR) 0.05 % ophthalmic solution Use in affected eye bid to tid for allergies Patient taking differently: Administer 1 Drop to both eyes two (2) times a day. 6/1/18   Debora Ernandez MD  
insulin NPH (HUMULIN N NPH U-100 INSULIN) 100 unit/mL injection 100 to 150 units bid per sliding scale   DX E11.65  Indications: type 2 diabetes mellitus Patient taking differently: by SubCUTAneous route Before breakfast and dinner. 100 units or  Adjusts  according to glucose per sliding scale  Indications: type 2 diabetes mellitus 6/1/18   Debora Ernandez MD  
Insulin Syringe-Needle U-100 (BD INSULIN SYRINGE) 1 mL 28 gauge x 1/2\" syrg Use 5 x a day. Dx E11.65  Indications: 5x a day 6/1/18   Debora Ernandez MD  
glucose blood VI test strips (ONETOUCH ULTRA TEST) strip Use bid to tid   DX: E 11.65   Disp with lancets of formulary  Indications: dispense with lancets 6/1/18   Debora Ernandez MD  
bumetanide (BUMEX) 1 mg tablet 1 to 2 every day for fluid Patient taking differently: Take  by mouth daily. 1 to 2 every day for fluid 2/23/18   Debora Ernandez MD  
potassium chloride (KLOR-CON) 10 mEq tablet 1 every day for potassium Patient taking differently: Take 10 mEq by mouth daily. 2/23/18   Debora Ernandez MD  
DULoxetine (CYMBALTA) 60 mg capsule Take 1 Cap by mouth daily.  Indications: ANXIETY WITH DEPRESSION, NEUROPATHIC PAIN 2/23/18   Debora Ernandez MD  
lancets [facility] LANCETS) 30 gauge misc Use up to tid   Dx E11.65 18   Fouzia Calixto MD  
losartan (COZAAR) 100 mg tablet 1 every day for BP  Indications: hypertension Patient taking differently: Take 100 mg by mouth daily. Indications: high blood pressure 18   Fouzia Calixto MD  
Blood-Glucose Meter [facility]) monitoring kit Use bid to tid   DX E11.65  If another product preferred on formulary please let me know which to select from. jlb 17   Fouzia Calixto MD  
fexofenadine-pseudoephedrine (ALLEGRA-D 24) 180-240 mg per tablet Take 1 Tab by mouth daily as needed. Indications: ALLERGIC RHINITIS 16   Chester Palencia MD  
aspirin 81 mg chewable tablet Take 1 Tab by mouth daily. 16   Chester Palencia MD  
polyethylene glycol (MIRALAX) 17 gram packet Take 1 Packet by mouth daily. 16   Chester Palencia MD  
hydrocortisone (ANUSOL-HC) 2.5 % rectal cream Insert  into rectum four (4) times daily. 16   Annie Fink PA-C  
nitroglycerin (NITROSTAT) 0.4 mg SL tablet 1 Tab by SubLINGual route as needed for Chest Pain. 16   Marcos Joyner MD  
fluticasone (FLONASE) 50 mcg/actuation nasal spray 1 spray IEN every day to BID Patient taking differently: 1 Spray by Both Nostrils route daily. 10/14/15   Fouzia Calixto MD  
 
 
 
Review of Systems:  A comprehensive review of systems was negative except for that written in the HPI. Objective:  
 
Patient Vitals for the past 8 hrs: 
 BP Temp Pulse Resp SpO2 Weight  
19 1342     97 %   
19 1115 124/73 97.9 °F (36.6 °C) (!) 53 18 96 %   
19 1032      (!) 356 lb 11.2 oz (161.8 kg) 19 0730 122/72 97.7 °F (36.5 °C) 65 18 95 %  Temp (24hrs), Av.1 °F (36.7 °C), Min:97.7 °F (36.5 °C), Max:98.4 °F (36.9 °C) Intake and Output:  
 1901 -  0700 In: -  
Out: 450 [Urine:450] Physical Exam: 
          General:    alert, cooperative, obese Skin:  no rash or abnormalities HEENT:  KIMBERLYN Throat/Neck:  neck supple Lungs:  dyspnea with exertion Cardiovascular:  RRR, S1 S2 Abdomen[de-identified]  soft, obese Genitalia:  penis difficult to access due to obesity, pt with urethral stricture, de la cruz placed and now draining Extremities:  peripheral pulses 2+ and symmetric Assessment:  
 
Principal Problem: 
  Acute metabolic encephalopathy (8/34/7919) Active Problems: 
  CAD (coronary artery disease)--cath in 2008 noted patent grafts, EF 35-40% (10/18/2010) Overview: [facility] 
 
  CKD (chronic kidney disease) stage 3, GFR 30-59 ml/min ([facility]) (10/18/2010) Morbid obesity ([address] (10/18/2010) Chronic systolic heart failure ([address] (10/20/2010) Essential hypertension, benign (9/15/2015) Mixed hyperlipidemia (9/15/2015) Diabetes mellitus type 2, controlled ([address] (12/15/2015) Atrial flutter ([address] (2/3/2016) Peripheral vascular disease; arterial ([facility]) () 
 
  S/P CABG (coronary artery bypass graft) () Overview: Cath 2010 stable JASON (obstructive sleep apnea) (4/7/2016) Humeral fracture (6/25/2019) UZMA (acute kidney injury) ([address] (6/25/2019) Metabolic encephalopathy (3/39/9873) Left humeral fracture (6/25/2019) difficult de la cruz placement. Plan:  
 
Continue de la cruz as per primary team.  Remove at their discretion. Thank you for the opportunity to assist in the care of this patient.   
 
 
 
  
Signed By: Mehul Modi NP

## 2019-06-28 NOTE — PROGRESS NOTES
Warfarin dosing per pharmacist 
 
Winston Wynn is a 68 y.o. male. Weight: 154 kg (339 lb 6.4 oz) Indication:  A fib Goal INR:  2-3 Home dose:  5 mg (5 mg x 1) every Mon, Wed, Fri; 2.5 mg (5 mg x 0.5) all other days Risk factors or significant drug interactions:  Allopurinol Other anticoagulants:  Heparin drip stopped 6/26 Daily Monitoring Date  INR     Warfarin dose HGB              Notes 6/26  2.4  5 mg  8.3 
6/27  2  5 mg  8.3 
6/28  1.9  5 mg  --- Pharmacy consulted to assist in dosing patient's warfarin. Patient was started on a heparin drip upon admission with therapeutic INR. INR within therapeutic range and heparin was subsequently discontinued. Patient did not take warfarin on 6/26. INR down to 1.9. Will continue home regimen of 5 mg tonight. Thank you, Elieser Calixto, PharmD Clinical Pharmacist 
[phone]

## 2019-06-28 NOTE — PROGRESS NOTES
Bed offered and accepted at [facility]. SW requested that [facility], Soledad Lewis RN, initiate insurance precert on this date. Awaiting insurance authorization for DC to STR. Patient and spouse notified that DC will be ordered once insurance approves and patient is medically stable.

## 2019-06-28 NOTE — PROGRESS NOTES
Patient c/o SOB, patient anxious. Wife at bedside. Patient A & O x4. Respiratory paged and at bedside. Hospitalist paged x2, no return phone call. Oncoming primary RN at bedside. Bedside report given.

## 2019-06-28 NOTE — PROGRESS NOTES
PT attempted treatment this afternoon but the patient and his wife politely refused stating he sat up in the bedside chair for a long time and transferred to/from the bedside commode and he says he is \"worn out\". PT will check back tomorrow.   
 
Becca Farias, SENIAT

## 2019-06-28 NOTE — PROGRESS NOTES
Problem: Self Care Deficits Care Plan (Adult) Goal: *Acute Goals and Plan of Care (Insert Text) Description 1. Pt will complete UB bathing and dressing with min A using adaptive techniques/ AE PRN 2. Pt will complete bed mobility with min A in prep for ADLs 3. Pt will complete functional transfers for ADLs with mod A 4. Pt will maintain sitting balance for ADls with SBA 5. Pt will complete grooming and hygiene with set up 6. Pt will demonstrate independence with HEP to promote improved L elbow, wrist, and hand strength and ROM for ADLs 7. Pt will toilet with mod A Timeframe: 7 days Outcome: Progressing Towards Goal 
  
OCCUPATIONAL THERAPY: Daily Note, AM and PM  
 6/28/2019 OBSERVATION: OT Visit Days: 1 Payor: Juanito He / Plan: [employer] / Product Type: HMO /  
  
NAME/AGE/GENDER: Ebony Cai is a 68 y.o. male PRIMARY DIAGNOSIS:  UZMA (acute kidney injury) ([facility] [address] [N17.9] Metabolic encephalopathy [A22.04] Left humeral fracture [S42.302A] [facility] [N17.9] Acute metabolic encephalopathy [N96.97] Acute metabolic encephalopathy Acute metabolic encephalopathy ICD-10: Treatment Diagnosis:  
 · History of falling (Z91.81) Precautions/Allergies: 
  Falls, LUE NWB, sling Patient has no known allergies. ASSESSMENT:  
Mr. Henny Cannon was admitted with AMS, multiple recent falls, had a recent admission for back pain. Pt landed on L side during one fall, workup + displaced L humerus fracture. Per ortho consult, LUE NWB and no surgical intervention is planned. Pt lives with his wife and requires assistance for bathing and bed mobility, is able to dress and toilet w/o assistance. Pt uses RW for mobility, has had 3 falls within the last week. 6/28/2019 Pt presents in supine upon arrival. Pt a little apprehensive about getting up initially, but did fine afterward.  Pt required max a x's 2 for supine to sit transfer and mod a x's 2 with multiple for multiple sit to stand transfers with a rolling walker. Pt was positioned up in the chair with belongings in reach. Addendum: Later in the afternoon, this therapist was called back into the room twice to assist with toilet transfer and additional sit to stand transfers. Pt left on [facility] with staff and wife in room. Good effort with encouragement. Continue POC. This section established at most recent assessment PROBLEM LIST (Impairments causing functional limitations): 1. Decreased Strength 2. Decreased ADL/Functional Activities 3. Decreased Transfer Abilities 4. Decreased Balance 5. Increased Pain 6. Decreased Activity Tolerance 7. Decreased Cognition INTERVENTIONS PLANNED: (Benefits and precautions of occupational therapy have been discussed with the patient.) 1. Activities of daily living training 2. Adaptive equipment training 3. Balance training 4. Cognitive training 5. Donning&doffing training 6. Primitivo tech training 7. Hygiene training 8. Therapeutic activity 9. Therapeutic exercise TREATMENT PLAN: Frequency/Duration: Follow patient 3 times/ week to address above goals. Rehabilitation Potential For Stated Goals: Fair REHAB RECOMMENDATIONS (at time of discharge pending progress):   
Placement: It is my opinion, based on this patient's performance to date, that Mr. Tanya Gallegos may benefit from d/c to SNF. Equipment:  
? None at this time OCCUPATIONAL PROFILE AND HISTORY:  
History of Present Injury/Illness (Reason for Referral): 
See H&P Past Medical History/Comorbidities: Mr. Tanya Gallegos  has a past medical history of Atrial flutter ([facility] [address] (2/3/2016), CAD (coronary artery disease), Cardiomyopathy ([address] Chronic systolic heart failure ([address] (10/20/2010), CKD (chronic kidney disease) stage 3, GFR 30-59 ml/min ([address] (10/18/2010), Diabetic peripheral neuropathy ([address] (9/15/2015), Dyslipidemia (10/18/2010), Edema, Essential hypertension, benign (9/15/2015), Insomnia (9/15/2015), Mixed hyperlipidemia (9/15/2015), Morbid obesity ([facility] [address] (10/18/2010), Nephrolithiasis, Peripheral neuropathy, Renal insufficiency, Right heart failure ([facility] [address] S/P CABG (coronary artery bypass graft), and Type II or unspecified type diabetes mellitus without mention of complication, uncontrolled ([facility] [address] (9/15/2015). Mr. Henny Cannon  has a past surgical history that includes hx urological; hx coronary stent placement (Right); hx cataract removal; and pr cardiac surg procedure unlist. 
Social History/Living Environment:  
Home Environment: Private residence # Steps to Enter: 2 One/Two Story Residence: One story Living Alone: No 
Support Systems: Spouse/Significant Other/Partner Patient Expects to be Discharged to[de-identified] Unknown Current DME Used/Available at Home: Sherol Saris, rolling, Walker, rollator, Wheelchair, Shower chair, Oxygen, portable Tub or Shower Type: Tub/Shower combination Prior Level of Function/Work/Activity: 
Pt lives with his wife and requires assistance for bathing and bed mobility, is able to dress and toilet w/o assistance. Pt uses RW for mobility, has had 3 falls within the last week. Number of Personal Factors/Comorbidities that affect the Plan of Care: Extensive review of physical, cognitive, and psychosocial performance (3+):  HIGH COMPLEXITY ASSESSMENT OF OCCUPATIONAL PERFORMANCE[de-identified]  
Activities of Daily Living:  
Basic ADLs (From Assessment) Complex ADLs (From Assessment) Feeding: Minimum assistance Oral Facial Hygiene/Grooming: Minimum assistance Bathing: Total assistance Upper Body Dressing: Maximum assistance, Total assistance Lower Body Dressing: Total assistance Toileting: Total assistance Instrumental ADL Meal Preparation: Total assistance Homemaking: Adaptive equipment Medication Management: Maximum assistance Financial Management: Maximum assistance Grooming/Bathing/Dressing Activities of Daily Living Functional Transfers Toilet Transfer : Moderate assistance;Assist x2 Bed/Mat Mobility Rolling: Maximum assistance Supine to Sit: Maximum assistance;Assist x2 Scooting: Maximum assistance Most Recent Physical Functioning:  
Gross Assessment: 
  
         
  
Posture: 
Posture (WDL): Exceptions to [facility] Posture Assessment: Forward head, Rounded shoulders Balance: 
Sitting: Impaired Sitting - Static: Good (unsupported) Sitting - Dynamic: Prop sitting Standing: Impaired Standing - Static: Constant support Standing - Dynamic : Constant support; Fair Bed Mobility: 
Rolling: Maximum assistance Supine to Sit: Maximum assistance;Assist x2 Scooting: Maximum assistance Wheelchair Mobility: 
  
Transfers: 
   
 
    
 
Patient Vitals for the past 6 hrs: 
 BP BP Patient Position SpO2 O2 Flow Rate (L/min) Pulse  
06/28/19 1115 124/73 Sitting 96 %  (!) 53  
06/28/19 1342   97 % 2 l/min  Mental Status Neurologic State: Alert, Confused Orientation Level: Oriented to person, Oriented to place, Oriented to time Cognition: Follows commands Perception: Appears intact Perseveration: No perseveration noted Safety/Judgement: Fall prevention Physical Skills Involved: 1. Range of Motion 2. Balance 3. Strength 4. Activity Tolerance 5. Fine Motor Control 6. Gross Motor Control 7. Pain (acute) 8. Pain (Chronic) Cognitive Skills Affected (resulting in the inability to perform in a timely and safe manner): 1. Executive Function 2. Sustained Attention 3. Divided Attention Psychosocial Skills Affected: 1. Habits/Routines 2. Environmental Adaptation 3. Emotional Regulation 4. Self-Awareness Number of elements that affect the Plan of Care: 5+:  HIGH COMPLEXITY CLINICAL DECISION MAKING:  
[employer] AM-PAC 6 Clicks Daily Activity Inpatient Short Form How much help from another person does the patient currently need. .. Total A Lot A Little None 1. Putting on and taking off regular lower body clothing? ? 1   ? 2   ? 3   ? 4  
2. Bathing (including washing, rinsing, drying)? ? 1   ? 2   ? 3   ? 4  
3. Toileting, which includes using toilet, bedpan or urinal?   ? 1   ? 2   ? 3   ? 4  
4. Putting on and taking off regular upper body clothing? ? 1   ? 2   ? 3   ? 4  
5. Taking care of personal grooming such as brushing teeth? ? 1   ? 2   ? 3   ? 4  
6. Eating meals? ? 1   ? 2   ? 3   ? 4  
© 2007, Trustees of [address] [place] [address] [address] [id number], under license to [employer]. All rights reserved Score:  Initial: 10 Most Recent: X (Date: -- ) Interpretation of Tool:  Represents activities that are increasingly more difficult (i.e. Bed mobility, Transfers, Gait). Medical Necessity:    
· Patient is expected to demonstrate progress in strength, range of motion, balance, coordination and functional technique ·  to decrease assistance required with ADLs · . Reason for Services/Other Comments: 
· Patient continues to require present interventions due to patient's inability to complete ADLs · . Use of outcome tool(s) and clinical judgement create a POC that gives a: HIGH COMPLEXITY  
 
 
 
TREATMENT:  
(In addition to Assessment/Re-Assessment sessions the following treatments were rendered) Pre-treatment Symptoms/Complaints:   
Pain: Initial:  
Pain Intensity 1: 0  Post Session:  10 Therapeutic Activity: (40 minutes): Therapeutic activities including Bed transfers, Chair transfers and static/dyanmic standing to improve mobility, strength and balance. Required moderate assist to promote static and dynamic balance in standing. PM Tx:  
Therapeutic Activity: (8 minutes):   Therapeutic activities including Toilet transfers and sit to stand transfers and static/dynamic standing to improve mobility, strength and balance. Required moderate assist  to promote static and dynamic balance in standing. Braces/Orthotics/Lines/Etc:  
· O2 Device: Nasal cannula Treatment/Session Assessment:   
· Response to Treatment:  pain · Interdisciplinary Collaboration:  
o Certified Occupational Therapy Assistant 
o Registered Nurse 
o Certified Nursing Assistant/Patient Care Technician · After treatment position/precautions:  
o Call light within reach 
o Family at bedside 
o Pt on [facility] with PCT in room · Compliance with Program/Exercises: Noncompliant some of the time, Will assess as treatment progresses. · Recommendations/Intent for next treatment session: \"Next visit will focus on advancements to more challenging activities and reduction in assistance provided\". Total Treatment Duration: OT Patient Time In/Time Out Time In: 7128(1900) Time Out: 3761(5568) Jean Kim

## 2019-06-28 NOTE — PROGRESS NOTES
Hospitalist Progress Note Subjective:  
Daily Progress Note: 6/28/2019 9:26 AM 
 
Mr. Joel Pike is a 69 yo morbidly obese white male with a PMH of aflutter on coumadin, CAD, cardiomyopathy, chronic systolic CHF, CKD 3, diabetic peripheral neuropathy, HTN, JASON using CPAP, DM II, chronic ulcerations of LE bilaterally who presented 6/25 to hospital after multiple falls and with increased confusion.  Wife reported pt had started taking ambien and hydrocodone since a fall with lower back pain last week.  Pt found to have a left humeral fx. Ortho consulted, deemed non surgical.  CT head showed no acute findings.  Creatinine elevated at 2.47 on admission, baseline around 1.8, has trended down to 1.4 with IVFs.  Wound care consulted for LE wounds. Ortho ordered brace from [facility], awaiting. Patient has been noncompliant with working with PT wanting to be completely without pain. Re emphasized how this is an unrealistic expectation and how narcotics decrease respiratory drive and led to his fall and altered mental status. Overnight became SOB and is currently on 5L NC. No accessory muscle use but has crackles and is edematous. No chest pain. Wife at bedside, all questions answered. Current Facility-Administered Medications Medication Dose Route Frequency  furosemide (LASIX) injection 40 mg  40 mg IntraVENous DAILY  acetaminophen (TYLENOL) tablet 650 mg  650 mg Oral Q8H  
 traMADol (ULTRAM) tablet 50 mg  50 mg Oral Q8H PRN  
 insulin glargine (LANTUS) injection 15 Units  15 Units SubCUTAneous DAILY  lidocaine 4 % patch 2 Patch  2 Patch TransDERmal Q24H  
 metaxalone (SKELAXIN) tablet 400 mg  400 mg Oral TID PRN  
 tuberculin injection 5 Units  5 Units IntraDERMal ONCE  
 atorvastatin (LIPITOR) tablet 40 mg  40 mg Oral DAILY  azelastine (OPTIVAR) 0.05 % ophthalmic solution drop 1 Drop (Patient Supplied)  1 Drop Both Eyes BID PRN  
 DULoxetine (CYMBALTA) capsule 60 mg  60 mg Oral DAILY  metoprolol tartrate (LOPRESSOR) tablet 12.5 mg  12.5 mg Oral BID  polyethylene glycol (MIRALAX) packet 17 g  17 g Oral DAILY  warfarin (COUMADIN) tablet 5 mg  5 mg Oral Once per day on   warfarin (COUMADIN) tablet 2.5 mg  2.5 mg Oral Once per day on Sun Tue Thu Sat  
 naloxone [facility]) injection 0.4 mg  0.4 mg IntraVENous PRN  
 ondansetron (ZOFRAN) injection 4 mg  4 mg IntraVENous Q4H PRN  
 albuterol (PROVENTIL VENTOLIN) nebulizer solution 2.5 mg  2.5 mg Nebulization Q6H RT  
 insulin lispro (HUMALOG) injection   SubCUTAneous AC&HS  
 alcohol 62% (NOZIN) nasal  1 Ampule  1 Ampule Topical Q12H Review of Systems A comprehensive review of systems was negative except for that written in the HPI. Objective:  
 
Visit Vitals /72 (BP 1 Location: Right arm, BP Patient Position: At rest) Pulse 65 Temp 97.7 °F (36.5 °C) Resp 18 Wt 154 kg (339 lb 6.4 oz) SpO2 95% BMI 47.34 kg/m² O2 Flow Rate (L/min): 2 l/min(Home O2) O2 Device: Nasal cannula Temp (24hrs), Av °F (36.7 °C), Min:97.6 °F (36.4 °C), Max:98.4 °F (36.9 °C) No intake/output data recorded.  1901 -  0700 In: -  
Out: 450 [Urine:450] General: awake, alert, oriented, morbidly obese Eyes; non icteric, EOMI Neck; supple Cv: IRIR, normal rate Pulm; shallow, diminished in bases, crackles present, no accessory muscle use Abd; soft, non tender Ext: adequate turgor, 2+ pitting edema of all extremities Additional comments:I reviewed the patient's new clinical lab test results. consult notes Data Review Recent Results (from the past 24 hour(s)) GLUCOSE, POC Collection Time: 19 11:19 AM  
Result Value Ref Range Glucose (POC) 292 (H) 65 - 100 mg/dL GLUCOSE, POC Collection Time: 19  4:15 PM  
Result Value Ref Range Glucose (POC) 241 (H) 65 - 100 mg/dL PROTHROMBIN TIME + INR Collection Time: 19  3:44 AM  
Result Value Ref Range Prothrombin time 21.5 (H) 11.7 - 14.5 sec INR 1.9 GLUCOSE, POC Collection Time: 06/28/19  6:59 AM  
Result Value Ref Range Glucose (POC) 317 (H) 65 - 100 mg/dL Assessment/Plan:  
 
Principal Problem: 
  Acute metabolic encephalopathy (1/56/0475) Active Problems: 
  CAD (coronary artery disease)--cath in 2008 noted patent grafts, EF 35-40% (10/18/2010) Overview: ASCAD 
 
  CKD (chronic kidney disease) stage 3, GFR 30-59 ml/min ([facility]) (10/18/2010) Morbid obesity ([address] (10/18/2010) Chronic systolic heart failure ([address] (10/20/2010) Essential hypertension, benign (9/15/2015) Mixed hyperlipidemia (9/15/2015) Diabetes mellitus type 2, controlled ([address] (12/15/2015) Atrial flutter ([address] (2/3/2016) On coumadin Peripheral vascular disease; arterial ([facility]) () 
 
  S/P CABG (coronary artery bypass graft) () Overview: Cath 2010 stable JASON (obstructive sleep apnea) (4/7/2016) Humeral fracture (6/25/2019) UZMA (acute kidney injury) ([address] (6/25/2019) Resolved with IVFs, now overloaded. Stop IVFs Metabolic encephalopathy (7/84/1385) Due to polypharmacy. Narcotics/sleep aids are not a good idea in this morbidly obese male with heart failure. Will decrease ultram to 50 q8 prn and schedule tylenol 650 mg q8. He is also on skelaxin prn and cymbalta. Left humeral fracture (6/25/2019) PT, OT. Needs STR, awaiting precert from insurance as he is accepted to [facility] Acute hypoxic respiratory failure. Stop ivfs. Lasix 40 mg iv daily. Daily weights. Insert de la cruz for I's and O's. Care Plan discussed with: Patient/Family, Nurse and

## 2019-06-28 NOTE — PROGRESS NOTES
ORTH FRACTURE PROGRESS NOTE 2019 Admit Date:  
2019 Post Op day: * No surgery found * Subjective:   
Anhse Trevino complains of arm pain. Has recently received left arm brace PT/OT:  
Gait:    
            
 
Vital Signs:   
Patient Vitals for the past 8 hrs: 
 BP Temp Pulse Resp SpO2 Weight  
19 1032      (!) 161.8 kg (356 lb 11.2 oz)  
19 0730 122/72 97.7 °F (36.5 °C) 65 18 95 %   
19 0714     96 %   
19 0653 189/80      Temp (24hrs), Av.1 °F (36.7 °C), Min:97.7 °F (36.5 °C), Max:98.4 °F (36.9 °C) Pain Control:  
Pain Assessment Pain Scale 1: FLACC Pain Intensity 1: 6 Pain Onset 1: Acute Pain Location 1: Back Pain Orientation 1: Lower Pain Description 1: Aching Pain Intervention(s) 1: Medication (see MAR) Meds: 
 
Current Facility-Administered Medications Medication Dose Route Frequency  furosemide (LASIX) injection 40 mg  40 mg IntraVENous DAILY  acetaminophen (TYLENOL) tablet 650 mg  650 mg Oral Q8H  
 traMADol (ULTRAM) tablet 50 mg  50 mg Oral Q8H PRN  
 insulin glargine (LANTUS) injection 15 Units  15 Units SubCUTAneous DAILY  lidocaine 4 % patch 2 Patch  2 Patch TransDERmal Q24H  
 metaxalone (SKELAXIN) tablet 400 mg  400 mg Oral TID PRN  
 tuberculin injection 5 Units  5 Units IntraDERMal ONCE  
 atorvastatin (LIPITOR) tablet 40 mg  40 mg Oral DAILY  azelastine (OPTIVAR) 0.05 % ophthalmic solution drop 1 Drop (Patient Supplied)  1 Drop Both Eyes BID PRN  
 DULoxetine (CYMBALTA) capsule 60 mg  60 mg Oral DAILY  metoprolol tartrate (LOPRESSOR) tablet 12.5 mg  12.5 mg Oral BID  polyethylene glycol (MIRALAX) packet 17 g  17 g Oral DAILY  warfarin (COUMADIN) tablet 5 mg  5 mg Oral Once per day on   warfarin (COUMADIN) tablet 2.5 mg  2.5 mg Oral Once per day on Sun Tue Thu Sat  
 naloxone [facility]) injection 0.4 mg  0.4 mg IntraVENous PRN  
  ondansetron (ZOFRAN) injection 4 mg  4 mg IntraVENous Q4H PRN  
 albuterol (PROVENTIL VENTOLIN) nebulizer solution 2.5 mg  2.5 mg Nebulization Q6H RT  
 insulin lispro (HUMALOG) injection   SubCUTAneous AC&HS  
 alcohol 62% (NOZIN) nasal  1 Ampule  1 Ampule Topical Q12H  
 
 
LAB:   
Recent Labs  
  06/28/19 
0344 06/27/19 
4253 HCT  --  28.4* HGB  --  8.3* INR 1.9 2.0  
 
 
24 Hour Assessment Issues:   
Oriented Discharge Planning: SNF Transfuse PRBC's:   
 
Assessment & Physician's Comment: 
Neurovascular checks within normal limits left arm Principal Problem: 
  Acute metabolic encephalopathy (3/67/4280) Active Problems: 
  CAD (coronary artery disease)--cath in 2008 noted patent grafts, EF 35-40% (10/18/2010) Overview: ASCAD 
 
  CKD (chronic kidney disease) stage 3, GFR 30-59 ml/min ([facility]) (10/18/2010) Morbid obesity ([facility] [address] (10/18/2010) Chronic systolic heart failure ([facility] [address] (10/20/2010) Essential hypertension, benign (9/15/2015) Mixed hyperlipidemia (9/15/2015) Diabetes mellitus type 2, controlled ([address] (12/15/2015) Atrial flutter ([address] (2/3/2016) Peripheral vascular disease; arterial ([facility]) () 
 
  S/P CABG (coronary artery bypass graft) () Overview: Cath 2010 stable JASON (obstructive sleep apnea) (4/7/2016) Humeral fracture (6/25/2019) UZMA (acute kidney injury) ([facility] [address] (6/25/2019) Metabolic encephalopathy (4/73/2777) Left humeral fracture (6/25/2019) Plan:use of fracture brace 24/7 Explained to patient . Will require follow up with Ortho 2-3 weeks after discharge Alden Ledezma DO

## 2019-06-29 NOTE — PROGRESS NOTES
Problem: Mobility Impaired (Adult and Pediatric) Goal: *Acute Goals and Plan of Care (Insert Text) Description STG: 
(1.)Mr. Liz Gonzalez will move from supine to sit and sit to supine , scoot up and down and roll side to side with MODERATE ASSIST within 3 treatment day(s). (2.)Mr. Liz Gonzalez will transfer from bed to chair and chair to bed with MODERATE ASSIST using the least restrictive device within 3 treatment day(s). (3.)Mr. Liz Gonzalez will ambulate with MAXIMAL ASSIST for 3 feet with the least restrictive device within 3 treatment day(s). (4.)Mr. Liz Gonzalez will perform seated static and dynamic balance activities x 15 minutes with STAND BY ASSIST to improve safety within 3 day(s). (5.)Mr. Liz Gonzalez will perform standing static and dynamic balance activities x 5 minutes with MAXIMAL ASSIST to improve safety within 3 day(s). (6.)Mr. Liz Gonzalez will maintain NWB LUE throughout all functional mobility within 3 days. LTG: 
(1.)Mr. Liz Gonzalez will move from supine to sit and sit to supine , scoot up and down and roll side to side in bed with MINIMAL ASSIST within 7 treatment day(s). (2.)Mr. Liz Gonzalez will transfer from bed to chair and chair to bed with MINIMAL ASSIST using the least restrictive device within 7 treatment day(s). (3.)Mr. Liz Gonzalez will ambulate with MODERATE ASSIST for 10 feet with the least restrictive device within 7 treatment day(s). (4.)Mr. Liz Gonzalez will perform standing static and dynamic balance activities x 10 minutes with MODERATE ASSIST to improve safety within 7 day(s). ________________________________________________________________________________________________ Outcome: Progressing Towards Goal 
  
PHYSICAL THERAPY: Daily Note and PM 6/29/2019 OBSERVATION: PT Visit Days : 1 Payor: Renae Mccall / Plan: [address] HMO / Product Type: HMO /   
LUE NWB  
NAME/AGE/GENDER: Boni Hebert is a 68 y.o. male PRIMARY DIAGNOSIS: [facility] [N17.9] Metabolic encephalopathy [G11.21] Left humeral fracture [S42.302A] UZMA (acute kidney injury) ([facility] [N17.9] Acute metabolic encephalopathy [I79.82] Acute metabolic encephalopathy Acute metabolic encephalopathy ICD-10: Treatment Diagnosis:  
 · Generalized Muscle Weakness (M62.81) · Difficulty in walking, Not elsewhere classified (R26.2) · Repeated Falls (R29.6) · History of falling (Z91.81) Precaution/Allergies: 
Patient has no known allergies. ASSESSMENT:  
Mr. Kentrell Maldonado is a 68 y.o. Male admitted for UZMA, metabolic encephalopathy and L humeral fracture (non-operative). He lives with spouse in a single story home and typically ambulates without assistance ~30-50' with a rolling walker, requires assistance for ADLs and admits to 4-5 falls in the past 6 months. Patient is supine and cognitively a little off. He gets anxious then does not think he can do an activity taking a lot of time and encouragement to try. He performed supine exercises below not needing help but thinking he does. He sat up on the EOB with maximal assist x2 due to not being able to use his L arm and his size. He participated well with constant encouragement. He sat EOB a long time doing seated exercises and being talked into trying to stand. He stood from slightly elevated bed with minimal assist (HHA R) 3 times. On the third time I insisted he try to take steps along the EOB which he did with fair ability. He needed Moderate assist x2 to lay back down. Progress made and will need consistent encouragement which he should get at rehab. This section established at most recent assessment PROBLEM LIST (Impairments causing functional limitations): 1. Decreased Strength 2. Decreased ADL/Functional Activities 3. Decreased Transfer Abilities 4. Decreased Ambulation Ability/Technique 5. Decreased Balance 6. Increased Pain 7. Decreased Activity Tolerance 8. Decreased Pacing Skills 9. Increased Shortness of Breath 10. Decreased Knowledge of Precautions 11. Decreased [place] with Home Exercise Program 
12. Decreased Cognition INTERVENTIONS PLANNED: (Benefits and precautions of physical therapy have been discussed with the patient.) 1. Balance Exercise 2. Bed Mobility 3. Family Education 4. Gait Training 5. Home Exercise Program (HEP) 6. Therapeutic Activites 7. Therapeutic Exercise/Strengthening 8. Transfer Training TREATMENT PLAN: Frequency/Duration: 3 times a week for duration of hospital stay Rehabilitation Potential For Stated Goals: Fair REHAB RECOMMENDATIONS (at time of discharge pending progress):   
Placement: It is my opinion, based on this patient's performance to date, that Mr. Tanya Gallegos may benefit from intensive therapy at a [facility] after discharge due to the functional deficits listed above that are likely to improve with skilled rehabilitation and concerns that he/she may be unsafe to be unsupervised at home due to frequent falls, requiring significantly more assist for all mobility, unable to transfer/ambulate at this time and now NWB LUE which he utilizes for transfers and ambulation at baseline . Equipment:  
? None at this time HISTORY:  
History of Present Injury/Illness (Reason for Referral): 
Patient is a 68 y.o. male who presented to the ED for cc multiple falls with increased confusion. Hx of atrial flutter on warfarin, CKD stage III, DM type II insulin dependent, PAD, CAD s/p CABG, and chronic ulcerations of lower legs bilaterally. Wife is at bedside who gives most of the hx. No fevers at home and no worsening lesions to legs. Patient has been taking ambien along with his new prescription of hydrocodone since discharge last week for fall with low back pain. Wife states patient is slow to respond and lethargic. Vitals - HR 58. 85% on RA. Labs- WBC 11.9, Hg 8.3, MCV 71.5, Creatine 2.47 from baseline of 1.6. CT head showed no acute abnormality Left arm x ray showed left humeral fracture Past Medical History/Comorbidities: Mr. Megan Nguyen  has a past medical history of Atrial flutter ([facility] (2/3/2016), CAD (coronary artery disease), Cardiomyopathy ([facility] Chronic systolic heart failure ([facility] (10/20/2010), CKD (chronic kidney disease) stage 3, GFR 30-59 ml/min ([facility] (10/18/2010), Diabetic peripheral neuropathy ([address] (9/15/2015), Dyslipidemia (10/18/2010), Edema, Essential hypertension, benign (9/15/2015), Insomnia (9/15/2015), Mixed hyperlipidemia (9/15/2015), Morbid obesity ([facility] (10/18/2010), Nephrolithiasis, Peripheral neuropathy, Renal insufficiency, Right heart failure ([facility] S/P CABG (coronary artery bypass graft), and Type II or unspecified type diabetes mellitus without mention of complication, uncontrolled ([facility] (9/15/2015). Mr. Megan Nguyen  has a past surgical history that includes hx urological; hx coronary stent placement (Right); hx cataract removal; and pr cardiac surg procedure unlist. 
Social History/Living Environment:  
Home Environment: Private residence # Steps to Enter: 2 One/Two Story Residence: One story Living Alone: No 
Support Systems: Spouse/Significant Other/Partner Patient Expects to be Discharged to[de-identified] Unknown Current DME Used/Available at Home: Jeff Bond, rolling, Walker, rollator, Wheelchair, Shower chair, Oxygen, portable Tub or Shower Type: Tub/Shower combination Prior Level of Function/Work/Activity: 
He lives with spouse in a single story home and typically ambulates without assistance ~30-50' with a rolling walker, requires assistance for ADLs and admits to 4-5 falls in the past 6 months. Number of Personal Factors/Comorbidities that affect the Plan of Care: 3+: HIGH COMPLEXITY EXAMINATION:  
Most Recent Physical Functioning:  
Gross Assessment: 
  
         
  
Posture: 
  
Balance: 
  Bed Mobility: 
Supine to Sit: Maximum assistance;Assist x2 
 Sit to Supine: Maximum assistance;Assist x2 Wheelchair Mobility: 
  
Transfers: 
Sit to Stand: Minimum assistance Stand to Sit: Minimum assistance Gait: 
  
Gait Abnormalities: Decreased step clearance; Steppage gait Distance (ft): 2 Feet (ft)(3 steps elong EOB) Ambulation - Level of Assistance: Minimal assistance;Assist x2 Body Structures Involved: 1. Nerves 2. Metabolic 3. Endocrine 4. Bones 5. Joints 6. Muscles 7. Ligaments Body Functions Affected: 1. Mental 
2. Sensory/Pain 3. Neuromusculoskeletal 
4. Movement Related 5. Metobolic/Endocrine Activities and Participation Affected: 1. Learning and Applying Knowledge 2. Mobility 3. Self Care 4. Domestic Life 5. Interpersonal Interactions and Relationships 6. Community, Social and [place] Number of elements that affect the Plan of Care: 4+: HIGH COMPLEXITY CLINICAL PRESENTATION:  
Presentation: Evolving clinical presentation with changing clinical characteristics: MODERATE COMPLEXITY CLINICAL DECISION MAKIN[address] [place] [id number] AM-PAC 6 Clicks Basic Mobility Inpatient Short Form How much difficulty does the patient currently have. .. Unable A Lot A Little None 1. Turning over in bed (including adjusting bedclothes, sheets and blankets)? ? 1   ? 2   ? 3   ? 4  
2. Sitting down on and standing up from a chair with arms ( e.g., wheelchair, bedside commode, etc.)   ? 1   ? 2   ? 3   ? 4  
3. Moving from lying on back to sitting on the side of the bed?   ? 1   ? 2   ? 3   ? 4 How much help from another person does the patient currently need. .. Total A Lot A Little None 4. Moving to and from a bed to a chair (including a wheelchair)? ? 1   ? 2   ? 3   ? 4  
5. Need to walk in hospital room? ? 1   ? 2   ? 3   ? 4  
6. Climbing 3-5 steps with a railing? ? 1   ? 2   ? 3   ? 4  
© 2007, Trustees of [address] [place] [address] [address] [id number], under license to [employer]. All rights reserved Score:  Initial: 8 Most Recent: X (Date: -- ) Interpretation of Tool:  Represents activities that are increasingly more difficult (i.e. Bed mobility, Transfers, Gait). Medical Necessity:    
· Patient demonstrates fair ·  rehab potential due to higher previous functional level. Reason for Services/Other Comments: 
· Patient continues to require modification of therapeutic interventions to increase complexity of exercises · . Use of outcome tool(s) and clinical judgement create a POC that gives a: Questionable prediction of patient's progress: MODERATE COMPLEXITY  
  
 
 
 
TREATMENT:  
(In addition to Assessment/Re-Assessment sessions the following treatments were rendered) Pre-treatment Symptoms/Complaints:  L UE pain, premedicated prior to therapy. Pain: Initial:  
Pain Intensity 1: 2 Pain Location 1: Generalized  Post Session:  10/10 Therapeutic Activity: (    25 minutes): Therapeutic activities including Bed transfers and sitting balance/activity tolerance activities at edge of bed, sit to stand 3 times and steps along EOB to improve mobility, strength, balance and coordination. Required no assistance to promote static and dynamic balance in sitting and moderate assist x2 to stand along EOB. Therapeutic Exercise: (25 Minutes):  Exercises per grid below to improve mobility and strength. Required maximal verbal cues to promote proper body mechanics. Progressed complexity of movement as indicated. Date: 
6/29/19 Date: 
 Date: Activity/Exercise Parameters Parameters Parameters Supine AP 20x B Supine heel slides 10x2 B Supine quad sets 10x B 3 sec hold Seated TKE 5x2 B Seated marching 5x2 B Braces/Orthotics/Lines/Etc:  
· O2 Device: Nasal cannula Treatment/Session Assessment:   
· Response to Treatment:  Patient experienced desaturation to 91% on O2 during therapy. · Interdisciplinary Collaboration:  
o Physical Therapy Assistant o Registered Nurse · After treatment position/precautions:  
o Supine in bed 
o Bed/Chair-wheels locked 
o Bed in low position 
o Call light within reach 
o RN notified 
o Family at bedside · Compliance with Program/Exercises: Compliant most of the time · Recommendations/Intent for next treatment session: \"Next visit will focus on advancements to more challenging activities and reduction in assistance provided\". Total Treatment Duration: PT Patient Time In/Time Out Time In: 5717 Time Out: 1610 Lisandra Herron, PTA

## 2019-06-29 NOTE — PROGRESS NOTES
Problem: Diabetes Self-Management Goal: *Disease process and treatment process Description Define diabetes and identify own type of diabetes; list 3 options for treating diabetes. 6/28/2019 2018 by Tanya Barbosa RN Outcome: Progressing Towards Goal 
6/28/2019 0637 by Tanya Barbosa RN Outcome: Progressing Towards Goal 
Goal: *Incorporating nutritional management into lifestyle Description Describe effect of type, amount and timing of food on blood glucose; list 3 methods for planning meals. 6/28/2019 2018 by Tanya Barbosa RN Outcome: Progressing Towards Goal 
6/28/2019 0637 by Tanya Barbosa RN Outcome: Progressing Towards Goal 
Goal: *Incorporating physical activity into lifestyle Description State effect of exercise on blood glucose levels. 6/28/2019 2018 by Tanya Barbosa RN Outcome: Progressing Towards Goal 
6/28/2019 0637 by Tanya Barbosa RN Outcome: Progressing Towards Goal 
Goal: *Developing strategies to promote health/change behavior Description Define the ABC's of diabetes; identify appropriate screenings, schedule and personal plan for screenings. 6/28/2019 2018 by Tanya Barbosa RN Outcome: Progressing Towards Goal 
6/28/2019 0637 by Tanya Barbosa RN Outcome: Progressing Towards Goal 
Goal: *Using medications safely Description State effect of diabetes medications on diabetes; name diabetes medication taking, action and side effects. 6/28/2019 2018 by Tanya Barbosa RN Outcome: Progressing Towards Goal 
6/28/2019 0637 by Tanya Barbosa RN Outcome: Progressing Towards Goal 
Goal: *Monitoring blood glucose, interpreting and using results Description Identify recommended blood glucose targets  and personal targets. 6/28/2019 2018 by Tanya Barbosa RN Outcome: Progressing Towards Goal 
6/28/2019 0637 by Tanya Barbosa RN Outcome: Progressing Towards Goal 
Goal: *Prevention, detection, treatment of acute complications Description List symptoms of hyper- and hypoglycemia; describe how to treat low blood sugar and actions for lowering  high blood glucose level. 6/28/2019 2018 by Jay Quintero RN Outcome: Progressing Towards Goal 
6/28/2019 0637 by Jay Quintero RN Outcome: Progressing Towards Goal 
Goal: *Prevention, detection and treatment of chronic complications Description Define the natural course of diabetes and describe the relationship of blood glucose levels to long term complications of diabetes. 6/28/2019 2018 by Jay Quintero RN Outcome: Progressing Towards Goal 
6/28/2019 0637 by Jay Quintero RN Outcome: Progressing Towards Goal 
Goal: *Developing strategies to address psychosocial issues Description Describe feelings about living with diabetes; identify support needed and support network 6/28/2019 2018 by Jay Quintero RN Outcome: Progressing Towards Goal 
6/28/2019 0637 by Jay Quintero RN Outcome: Progressing Towards Goal 
Goal: *Insulin pump training 6/28/2019 2018 by Jay Quintero RN Outcome: Progressing Towards Goal 
6/28/2019 0637 by Jay uQintero RN Outcome: Progressing Towards Goal 
Goal: *Sick day guidelines 6/28/2019 2018 by Jay Quintero RN Outcome: Progressing Towards Goal 
6/28/2019 0637 by Jay Quintero RN Outcome: Progressing Towards Goal 
Goal: *Patient Specific Goal (EDIT GOAL, INSERT TEXT) 
6/28/2019 2018 by Jay Quintero RN Outcome: Progressing Towards Goal 
6/28/2019 0637 by Jay Quintero RN Outcome: Progressing Towards Goal 
  
Problem: Patient Education: Go to Patient Education Activity Goal: Patient/Family Education 6/28/2019 2018 by Jay Quintero RN Outcome: Progressing Towards Goal 
6/28/2019 0637 by Jay Quintero RN Outcome: Progressing Towards Goal 
  
Problem: Altered Thought Process (Adult/Pediatric) Goal: *STG: Participates in treatment plan 6/28/2019 2018 by Jay Quintero RN Outcome: Progressing Towards Goal 
 6/28/2019 0147 by Gilbert Menchaca RN Outcome: Progressing Towards Goal 
Goal: *STG: Remains safe in hospital 
6/28/2019 2018 by Gilbert Menchaca RN Outcome: Progressing Towards Goal 
6/28/2019 0637 by Gilbert Menchaca RN Outcome: Progressing Towards Goal 
Goal: *STG: Seeks staff when feelings of anxiety and fear arise 6/28/2019 2018 by Gilbert Menchaca RN Outcome: Progressing Towards Goal 
6/28/2019 0637 by Gilbert Menchaca RN Outcome: Progressing Towards Goal 
Goal: *STG: Complies with medication therapy 6/28/2019 2018 by Gilbert Menchaca RN Outcome: Progressing Towards Goal 
6/28/2019 0637 by Gilbert Menchaca RN Outcome: Progressing Towards Goal 
Goal: *STG: Attends activities and groups 6/28/2019 2018 by Gilbert Menchaca RN Outcome: Progressing Towards Goal 
6/28/2019 0637 by Gilbert Menchaca RN Outcome: Progressing Towards Goal 
Goal: *STG: Decreased delusional thinking 6/28/2019 2018 by Gilbert Menchaca, RN Outcome: Progressing Towards Goal 
6/28/2019 0637 by Gilbert Menchaca RN Outcome: Progressing Towards Goal 
Goal: *STG: Decreased hallucinations 6/28/2019 2018 by Gilbert Menchaca RN Outcome: Progressing Towards Goal 
6/28/2019 0637 by Gilbert Menchaca RN Outcome: Progressing Towards Goal 
Goal: *STG: Absence of lethality 6/28/2019 2018 by Gilbert Menchaca RN Outcome: Progressing Towards Goal 
6/28/2019 0637 by Gilbert Menchaca RN Outcome: Progressing Towards Goal 
Goal: *STG: Demonstrates ability to understand and use improved judgment in daily activities and relationships 6/28/2019 2018 by Gilbert Menchaca RN Outcome: Progressing Towards Goal 
6/28/2019 0637 by Gilbert Menchaca RN Outcome: Progressing Towards Goal 
Goal: *LTG: Returns to baseline functioning 6/28/2019 2018 by Gilbert Menchaca RN Outcome: Progressing Towards Goal 
6/28/2019 0637 by Gilbert Menchaca RN Outcome: Progressing Towards Goal 
Goal: Interventions 6/28/2019 2018 by Gilbert Menchaca RN 
 Outcome: Progressing Towards Goal 
6/28/2019 0637 by Tanya Barbosa RN Outcome: Progressing Towards Goal 
  
Problem: Patient Education: Go to Patient Education Activity Goal: Patient/Family Education 6/28/2019 2018 by Tanya Barbosa RN Outcome: Progressing Towards Goal 
6/28/2019 0637 by Tanya Barbosa RN Outcome: Progressing Towards Goal 
  
Problem: Falls - Risk of 
Goal: *Absence of Falls Description Document [place] Fall Risk and appropriate interventions in the flowsheet. 6/28/2019 2018 by Tanya Barbosa RN Outcome: Progressing Towards Goal 
6/28/2019 0637 by Tanya Barbosa RN Outcome: Progressing Towards Goal 
  
Problem: Patient Education: Go to Patient Education Activity Goal: Patient/Family Education 6/28/2019 2018 by Tanya Barbosa RN Outcome: Progressing Towards Goal 
6/28/2019 0637 by Tanya Barbosa RN Outcome: Progressing Towards Goal 
  
Problem: Patient Education: Go to Patient Education Activity Goal: Patient/Family Education 6/28/2019 2018 by Tanya Barbosa RN Outcome: Progressing Towards Goal 
6/28/2019 0637 by Tanya Barbosa RN Outcome: Progressing Towards Goal 
  
Problem: Breathing Pattern - Ineffective Goal: *Absence of hypoxia 6/28/2019 2018 by Tanya Barbosa RN Outcome: Progressing Towards Goal 
6/28/2019 0637 by Tanya Barbosa RN Outcome: Progressing Towards Goal 
Goal: *Use of effective breathing techniques 6/28/2019 2018 by Tanya Barbosa RN Outcome: Progressing Towards Goal 
6/28/2019 0637 by Tanya Barbosa RN Outcome: Progressing Towards Goal 
Goal: *PALLIATIVE CARE:  Alleviation of Dyspnea 6/28/2019 2018 by Tanya Barbosa RN Outcome: Progressing Towards Goal 
6/28/2019 0637 by Tanya Barbosa RN Outcome: Progressing Towards Goal 
  
Problem: Pressure Injury - Risk of 
Goal: *Prevention of pressure injury Description Document Jose Scale and appropriate interventions in the flowsheet.  
6/28/2019 2018 by Tanya Barbosa RN 
 Outcome: Progressing Towards Goal 
6/28/2019 0637 by Vijay Ramirez RN Outcome: Progressing Towards Goal 
  
Problem: Patient Education: Go to Patient Education Activity Goal: Patient/Family Education 6/28/2019 2018 by Vijay Ramirez RN Outcome: Progressing Towards Goal 
6/28/2019 0637 by Vijay Ramirez RN Outcome: Progressing Towards Goal 
  
Problem: Patient Education: Go to Patient Education Activity Goal: Patient/Family Education 6/28/2019 2018 by Vijay Ramirez RN Outcome: Progressing Towards Goal 
6/28/2019 0637 by Vijay Ramirez RN Outcome: Progressing Towards Goal 
  
Problem: Interdisciplinary Rounds Goal: Interdisciplinary Rounds Outcome: Progressing Towards Goal

## 2019-06-29 NOTE — DISCHARGE INSTRUCTIONS
Discharge Instructions  - Follow up with primary care physician, ortho and cardiology  - Call with any problems or questions. - Take the listed and prescribed medications as directed. - Record BP daily and bring in log to PCP for adjustments as needed in blood pressure medications. - Call your cardiologist for signs of excess fluid: weight gain greater than 3 pounds, increased swelling, increased SOB, unable to lay flat and breath well. - Record your daily weight.   - Continue your diuretic (water pill)  - Limit fluid to 1.5 Liters a day  - Record blood sugar fasting before breakfast and 2 hours after dinner daily and bring in log to your PCP. - Take a stool softener daily or twice daily while on opioid therapy. - Increase fiber in your diet and may also take metamucil.  - only use laxatives if no bowel movement in more than 3 days. Do not use laxatives chronically.   - Return for worsening symptoms     Activity: PT/OT Eval and Treat  Diet: Diabetic Diet and Low fat, Low cholesterol  Wound Care: Keep wound clean and dry and Reinforce dressing PRN

## 2019-06-29 NOTE — DISCHARGE SUMMARY
Hospitalist Discharge Summary Patient ID: 
Felix Josue [id number] 
39 y.o. 
1950 Admit date: 6/25/2019  2:33 PM 
Discharge date and time: 6/30/2019 Attending: Susan Lee MD 
PCP:  Guillermo Rivero MD 
Treatment Team: Attending Provider: Susan Lee MD; Utilization Review: Cristian Nath RN; Consulting Provider: Kwan Jason MD; Care Manager: Nataly Young; Consulting Provider: Twan Salguero NP; Primary Nurse: Fang Mauro RN; Student Nurse: Moni Griffin Charge Nurse: Fatemeh Isabel Principal Diagnosis Acute metabolic encephalopathy Principal Problem: 
  Acute metabolic encephalopathy (7/79/1923) Active Problems: 
  CAD (coronary artery disease)--cath in 2008 noted patent grafts, EF 35-40% (10/18/2010) Overview: [facility] 
 
  CKD (chronic kidney disease) stage 3, GFR 30-59 ml/min ([facility]) (10/18/2010) Morbid obesity ([address] (10/18/2010) Chronic systolic heart failure ([address] (10/20/2010) Essential hypertension, benign (9/15/2015) Mixed hyperlipidemia (9/15/2015) Diabetes mellitus type 2, controlled ([address] (12/15/2015) Atrial flutter ([address] (2/3/2016) Peripheral vascular disease; arterial ([facility]) () 
 
  S/P CABG (coronary artery bypass graft) () Overview: Cath 2010 stable JASON (obstructive sleep apnea) (4/7/2016) Humeral fracture (6/25/2019) UZMA (acute kidney injury) ([address] (6/25/2019) Metabolic encephalopathy (7/46/0349) Left humeral fracture (6/25/2019) Hospital Course: 
Please refer to the admission H&P for details of presentation.  In summary, the Felix Josue is a 68 y.o. morbidly obese white male with a PMH of aflutter on coumadin, CAD, cardiomyopathy, chronic systolic CHF, CKD 3, diabetic peripheral neuropathy, HTN, JASON using CPAP, DM II A1C 7.6, chronic ulcerations of LE bilaterally who presented 6/25 to hospital after multiple falls and with increased confusion.  Wife reported pt had started taking ambien and hydrocodone since a fall with lower back pain last week.  Pt found to have a left humeral fx. Ortho consulted, deemed non surgical. Placed in brace and will need follow up as OP in 2-3 weeks. Continue PT/OT ast STR. Acute Metabolic Encephalopathy due to polypharmacy. CT head showed no acute findings. Narcotics/sleep aids are not a good idea in this morbidly obese male with heart failure. Continue new regimen: decreased ultram to 50 q8 prn and schedule tylenol 650 mg q8, skelaxin prn, lyrica and cymbalta. Acute kidney injury on CKD. Creatinine elevated at 2.47 on admission, baseline around 1.8, has trended down to 1.4 with IVFs.   
 
Acute on chronic systolic CHF EF 97-17% with acute on chronic resp failure. Given IV lasix, monitors I&Os. O2 demand likely now due to deconditioning. Stable to follow up as OP. Discharging with o2 at 3L. Aflutter on coumadin, CAD s/p CABG, cardiomyopathy: continue coumadin and INR monitoring. Significant Diagnostic Studies:  
Labs: Results:  
   
Chemistry Recent Labs  
  06/30/19 
7220 *   
K 3.7  CO2 31 BUN 37* CREA 1.20 CA 8.4 AGAP 6*  
  
CBC w/Diff Recent Labs  
  06/30/19 
0548 WBC 9.5  
RBC 4.32  
HGB 8.8* HCT 30.5*  Cardiac Enzymes No results for input(s): CPK, CKND1, CHAGO in the last 72 hours. No lab exists for component: Zuleima Ann Coagulation Recent Labs  
  06/30/19 
0548 06/29/19 
0504 PTP 29.7* 26.6* INR 2.9 2.5 Lipid Panel Lab Results Component Value Date/Time Cholesterol, total 109 09/28/2018 08:02 AM  
 HDL Cholesterol 23 (L) 09/28/2018 08:02 AM  
 LDL, calculated 29 09/28/2018 08:02 AM  
 VLDL, calculated 57 (H) 09/28/2018 08:02 AM  
 Triglyceride 285 (H) 09/28/2018 08:02 AM  
 CHOL/HDL Ratio 4.9 10/19/2010 04:44 AM  
  
BNP No results for input(s): BNPP in the last 72 hours. Liver Enzymes No results for input(s): TP, ALB, TBIL, AP, SGOT, GPT in the last 72 hours. No lab exists for component: DBIL Thyroid Studies Lab Results Component Value Date/Time TSH 1.910 06/25/2019 11:11 AM  
    
 
IMAGING/PROCEDURES: 
6/28: CXR 1. Cardiomegaly and central pulmonary infiltrates favored to represent 
pulmonary edema. These findings are most suggestive of heart failure. 6/25 XR SPINE THORAC: No acute bony abnormality XR SPINE LUMB:No acute bony abnormality CT HEAD: 1. Atrophy. 2. No acute intracranial abnormality. 3. Fluid in the mastoid air cells bilaterally XR HUMERUS:Fracture left humerus XR SHOULDER: Fracture proximal left humerus CXR: No acute abnormality Discharge Exam: 
Visit Vitals /70 (BP 1 Location: Right arm, BP Patient Position: At rest) Pulse (!) 47 Temp 98.4 °F (36.9 °C) Resp 20 Wt (!) 161.8 kg (356 lb 11.2 oz) SpO2 94% BMI 49.75 kg/m² S: Patient doesn't want to be discharge today for fear of pain during moving to another facility. - Encouraged and may provide pain medication prior to transfer. General: Alert and oriented, no acute distress, morbidly obese, wife at bedside. Eye: EOMI, Normal conjunctiva, Vision Unchanged. HENT: Normocephalic, atraumatic, Normal hearing, moist mucous membranes. Neck: Supple, Non-tender. Respiratory: diminished due to body habitus, Respirations are non-labored. No w/r/r o2 @3L via NC Cardiovascular: irregular rate and rhythm Gastrointestinal: Soft, Non-tender, positive bowel sounds Musculoskeletal: +1 edema left arm, left arm brace. Integumentary: LE BL dressings c/d/i. Neurologic: Alert, Oriented, No focal deficits. Cognition and Speech: Oriented, Speech clear and coherent. Psychiatric: Cooperative, tearful Disposition: STR Discharge Condition: stable Patient Instructions:  
Current Discharge Medication List  
  
START taking these medications Details acetaminophen (TYLENOL) 325 mg tablet Take 2 Tabs by mouth every eight (8) hours for 7 days. Indications: Pain 
Qty: 42 Tab, Refills: 0 Saccharomyces boulardii (FLORASTOR) 250 mg capsule Take 1 Cap by mouth two (2) times a day for 7 days. Qty: 14 Cap, Refills: 0  
  
simethicone (MYLICON) 80 mg chewable tablet Take 1 Tab by mouth four (4) times daily as needed for Flatulence. Qty: 12 Tab, Refills: 0  
  
traMADol (ULTRAM) 50 mg tablet Take 1 Tab by mouth every eight (8) hours as needed for Pain for up to 3 days. Max Daily Amount: 150 mg. Indications: Pain 
Qty: 9 Tab, Refills: 0 Associated Diagnoses: Other closed nondisplaced fracture of proximal end of left humerus with routine healing, subsequent encounter CONTINUE these medications which have NOT CHANGED Details  
allopurinol (ZYLOPRIM) 100 mg tablet Take 100 mg by mouth daily. !! warfarin (COUMADIN) 5 mg tablet Take 2.5 mg by mouth every Tuesday, Thursday, Saturday & Sunday. Takes 5 mg on Monday , Wednesday and Friday  
  
!! warfarin (COUMADIN) 5 mg tablet Take 5 mg by mouth every Monday, Wednesday, Friday. Takes 2.5 mg all other days  
  
insulin glulisine U-100 (APIDRA U-100 INSULIN) 100 unit/mL injection by SubCUTAneous route Before breakfast, lunch, and dinner. 100 units daily or adjusts according to glucose per sliding scale  
  
metoprolol tartrate (LOPRESSOR) 25 mg tablet Take 0.5 Tabs by mouth two (2) times a day. Qty: 30 Tab, Refills: 0  
  
pregabalin (LYRICA) 300 mg capsule 1 bid 
Qty: 180 Cap, Refills: 5 Associated Diagnoses: Diabetic peripheral neuropathy (HCC)  
  
zolpidem (AMBIEN) 10 mg tablet 1/2 to 1 qhs 
Qty: 30 Tab, Refills: 5 Associated Diagnoses: Insomnia, unspecified type  
  
atorvastatin (LIPITOR) 40 mg tablet 1 every day for cholesterol (replaces simvastatin) Qty: 90 Tab, Refills: 12 VITAMIN D2 50,000 unit capsule 1 qweek for Vitamin D replacement 
Qty: 12 Cap, Refills: 12 azelastine (ASTELIN) 137 mcg (0.1 %) nasal spray Use in each nostril bid 
Qty: 3 Bottle, Refills: 9 Associated Diagnoses: Allergic rhinitis, unspecified seasonality, unspecified trigger  
  
azelastine (OPTIVAR) 0.05 % ophthalmic solution Use in affected eye bid to tid for allergies Qty: 6 mL, Refills: 12  
 Associated Diagnoses: Vernal conjunctivitis of both eyes  
  
insulin NPH (HUMULIN N NPH U-100 INSULIN) 100 unit/mL injection 100 to 150 units bid per sliding scale   DX E11.65  Indications: type 2 diabetes mellitus Qty: 20 Vial, Refills: 12  
 Associated Diagnoses: Controlled type 2 diabetes mellitus with diabetic polyneuropathy, with long-term current use of insulin ([address] Insulin Syringe-Needle U-100 (BD INSULIN SYRINGE) 1 mL 28 gauge x 1/2\" syrg Use 5 x a day. Dx E11.65  Indications: 5x a day 
Qty: 500 Syringe, Refills: 12  
 Associated Diagnoses: Controlled type 2 diabetes mellitus with diabetic polyneuropathy, with long-term current use of insulin ([address] glucose blood VI test strips (ONETOUCH ULTRA TEST) strip Use bid to tid   DX: E 11.65   Disp with lancets of formulary  Indications: dispense with lancets Qty: 300 Strip, Refills: 12  
 Associated Diagnoses: Controlled type 2 diabetes mellitus with diabetic polyneuropathy, with long-term current use of insulin (HCC) bumetanide (BUMEX) 1 mg tablet 1 to 2 every day for fluid 
Qty: 60 Tab, Refills: 12  
 Associated Diagnoses: Chronic systolic heart failure ([address] Essential hypertension, benign  
  
potassium chloride (KLOR-CON) 10 mEq tablet 1 every day for potassium 
Qty: 30 Tab, Refills: 12  
 Associated Diagnoses: Chronic systolic heart failure ([address] Essential hypertension, benign DULoxetine (CYMBALTA) 60 mg capsule Take 1 Cap by mouth daily. Indications: ANXIETY WITH DEPRESSION, NEUROPATHIC PAIN Qty: 90 Cap, Refills: 12  
 Associated Diagnoses: Depression, unspecified depression type lancets (ONETOUCH DELICA LANCETS) 30 gauge misc Use up to tid   Dx E11.65 Qty: 300 Lancet, Refills: 12  
 Associated Diagnoses: Controlled type 2 diabetes mellitus with diabetic polyneuropathy, with long-term current use of insulin (HCC)  
  
losartan (COZAAR) 100 mg tablet 1 every day for BP  Indications: hypertension 
Qty: 90 Tab, Refills: 12 Blood-Glucose Meter (ONETOUCH ULTRA2) monitoring kit Use bid to tid   DX E11.65  If another product preferred on formulary please let me know which to select from. jlb Qty: 1 Kit, Refills: 12  
 Associated Diagnoses: Controlled type 2 diabetes mellitus with diabetic polyneuropathy, with long-term current use of insulin ([address] fexofenadine-pseudoephedrine (ALLEGRA-D 24) 180-240 mg per tablet Take 1 Tab by mouth daily as needed. Indications: ALLERGIC RHINITIS Qty: 90 Tab, Refills: 12  
  
aspirin 81 mg chewable tablet Take 1 Tab by mouth daily. Qty: 30 Tab, Refills: 0 Associated Diagnoses: Chronic systolic heart failure (HCC)  
  
polyethylene glycol (MIRALAX) 17 gram packet Take 1 Packet by mouth daily. Qty: 1 Each, Refills: 3 Associated Diagnoses: Chronic systolic heart failure (HCC)  
  
hydrocortisone (ANUSOL-HC) 2.5 % rectal cream Insert  into rectum four (4) times daily. Qty: 30 g, Refills: 0  
  
nitroglycerin (NITROSTAT) 0.4 mg SL tablet 1 Tab by SubLINGual route as needed for Chest Pain. Qty: 25 Bottle, Refills: 11  
  
fluticasone (FLONASE) 50 mcg/actuation nasal spray 1 spray IEN every day to BID Qty: 3 Bottle, Refills: 9  
  
 !! - Potential duplicate medications found. Please discuss with provider. STOP taking these medications HYDROcodone-acetaminophen (NORCO) 7.5-325 mg per tablet Comments:  
Reason for Stopping:   
   
 HYDROcodone-acetaminophen (NORCO) 5-325 mg per tablet Comments:  
Reason for Stopping:   
   
  
 
Discharge Instructions - Follow up with primary care physician, ortho and cardiology - Call with any problems or questions. - Take the listed and prescribed medications as directed. - Record BP daily and bring in log to PCP for adjustments as needed in blood pressure medications. - Call your cardiologist for signs of excess fluid: weight gain greater than 3 pounds, increased swelling, increased SOB, unable to lay flat and breath well. - Record your daily weight. - Limit fluid to 1.5 Liters a day - Record blood sugar fasting before breakfast and 2 hours after dinner daily and bring in log to your PCP. - Take a stool softener daily or twice daily while on opioid therapy. - Increase fiber in your diet and may also take metamucil. 
- only use laxatives if no bowel movement in more than 3 days. Do not use laxatives chronically. - Return for worsening symptoms Activity: PT/OT Eval and Treat Diet: Diabetic Diet and Low fat, Low cholesterol Wound Care: Keep wound clean and dry and Reinforce dressing PRN Discussed with Dr. Elicia Greer Patient was seen, examined and stable prior to discharge. 35 minutes was spent in the discharge of this patient; at bedside, explaining the instructions, reviewing notes, discussing the case. Patient and wife verbalizes understanding of the instructions and questions were answered to complete satisfaction. Patient is aware of the need to follow up and take medications as prescribed.  
 
Signed: 
Val Graf PA-C, MPAS 
6/30/2019 
3:46 PM

## 2019-06-30 NOTE — PROGRESS NOTES
TRANSFER - OUT REPORT: 
 
Verbal report given to Audelia on Aidan Foreman  being transferred to [place] for routine progression of care Report consisted of patients Situation, Background, Assessment and  
Recommendations(SBAR). Information from the following report(s) SBAR, Kardex, [facility] and Recent Results was reviewed with the receiving nurse. Lines:    
 
Opportunity for questions and clarification was provided. Patient transported with: 
 O2 @ 3 liters; EMT

## 2019-07-01 NOTE — PROGRESS NOTES
Patient has accepted a bed at [facility] and will be d/c to [facility] today. Patient will be transported by [employer] and patient / family agreeable. Patient has met all treatment goals / milestones. CM will continue to monitor. Care Management Interventions PCP Verified by CM: Yes Mode of Transport at Discharge: Other (see comment)([facility] ) Transition of Care Consult (CM Consult): Discharge Planning, SNF(Patient has accpted a bed at [facility]) Discharge Durable Medical Equipment: No 
Physical Therapy Consult: Yes Occupational Therapy Consult: Yes Speech Therapy Consult: No 
Current Support Network: Own Home, Lives with Spouse Confirm Follow Up Transport: Other (see comment)([facility] ) Plan discussed with Pt/Family/Caregiver: Yes(Spoke with patient and spouse (patient slightly confused). ) Freedom of Choice Offered: Yes [employer] Information Provided?: No 
Discharge Location Discharge Placement: Skilled nursing facility(Patient will be d/c to [facility])

## 2019-07-17 PROBLEM — S42.322A CLOSED DISPLACED TRANSVERSE FRACTURE OF SHAFT OF LEFT HUMERUS: Status: ACTIVE | Noted: 2019-01-01

## 2019-07-17 NOTE — H&P
[facility] HISTORY AND PHYSICAL    Subjective:     Patient is a 68 y.o. RHD MALE WITH LEFT ARM PAIN. SEE OFFICE NOTE.     Patient Active Problem List    Diagnosis Date Noted    Closed displaced transverse fracture of shaft of left humerus 07/17/2019    Acute metabolic encephalopathy 16/13/8080    Open leg wound 06/25/2019    Humeral fracture 06/25/2019    UZMA (acute kidney injury) ([address] 72/45/9504    Metabolic encephalopathy 85/84/4379    Left humeral fracture 06/25/2019    Persistent atrial fibrillation ([address] 01/30/2019    Toenail deformity 12/28/2018    Paronychia of great toe of right foot 12/28/2018    Microcytic anemia 12/28/2018    Long term (current) use of anticoagulants 04/27/2018    Type 2 diabetes mellitus with nephropathy ([address] 12/26/2017    Debility 11/14/2016    Rectal bleed 11/08/2016    JASON (obstructive sleep apnea) 04/07/2016    Edema     Nephrolithiasis     Peripheral vascular disease; arterial (HCC)     S/P CABG (coronary artery bypass graft)     Atrial flutter ([address] 02/03/2016    Diabetes mellitus type 2, controlled ([address] 12/15/2015    Essential hypertension, benign 09/15/2015    Mixed hyperlipidemia 09/15/2015    Insomnia 09/15/2015    Diabetic peripheral neuropathy ([address] 09/15/2015    Chronic systolic heart failure ([address] 10/20/2010    CAD (coronary artery disease)--cath in 2008 noted patent grafts, EF 35-40% 10/18/2010    CKD (chronic kidney disease) stage 3, GFR 30-59 ml/min ([address] 10/18/2010    Morbid obesity ([address] 10/18/2010     Past Medical History:   Diagnosis Date    Atrial flutter ([address] 2/3/2016    CAD (coronary artery disease)     Cardiomyopathy ([address]     Chronic systolic heart failure ([address] 10/20/2010    CKD (chronic kidney disease) stage 3, GFR 30-59 ml/min ([address] 10/18/2010    Diabetic peripheral neuropathy ([address] 9/15/2015    Dyslipidemia 10/18/2010    Edema     Essential hypertension, benign 9/15/2015    Insomnia 9/15/2015 sleep apnea - uses CPAP    Mixed hyperlipidemia 9/15/2015    Morbid [facility] 10/18/2010    Nephrolithiasis     Peripheral neuropathy     Renal insufficiency     Right heart failure (HCC)     S/P CABG (coronary artery bypass graft)     Type II or unspecified type diabetes mellitus without mention of complication, uncontrolled ([facility] 9/15/2015      Past Surgical History:   Procedure Laterality Date    CARDIAC SURG PROCEDURE UNLIST      By-pass x 5    HX CATARACT REMOVAL      HX CORONARY STENT PLACEMENT Right     femoral artery stent    HX UROLOGICAL      Kidney stone removal      Prior to Admission medications    Medication Sig Start Date End Date Taking? Authorizing Provider   simethicone (MYLICON) 80 mg chewable tablet Take 1 Tab by mouth four (4) times daily as needed for Flatulence. 6/29/19   HIRAM Foster   allopurinol (ZYLOPRIM) 100 mg tablet Take 100 mg by mouth daily. Provider, Historical   warfarin (COUMADIN) 5 mg tablet Take 2.5 mg by mouth every Tuesday, Thursday, Saturday & Sunday. Takes 5 mg on Monday , Wednesday and Friday    Provider, Historical   warfarin (COUMADIN) 5 mg tablet Take 5 mg by mouth every Monday, Wednesday, Friday. Takes 2.5 mg all other days    Provider, Historical   insulin glulisine U-100 (APIDRA U-100 INSULIN) 100 unit/mL injection by SubCUTAneous route Before breakfast, lunch, and dinner. 100 units daily or adjusts according to glucose per sliding scale    Provider, Historical   metoprolol tartrate (LOPRESSOR) 25 mg tablet Take 0.5 Tabs by mouth two (2) times a day. 6/22/19   Radha Webster MD   pregabalin (LYRICA) 300 mg capsule 1 bid  Patient taking differently: Take 300 mg by mouth two (2) times a day. Indications: Diabetic Complication causing Injury to some Body Nerves 2/18/19   Krystin Funk MD   zolpidem (AMBIEN) 10 mg tablet 1/2 to 1 qhs  Patient taking differently: Take  by mouth nightly as needed.  1/2 to 1 qhs  Indications: Difficulty Falling Asleep 2/18/19   Evelina Wagner MD   atorvastatin (LIPITOR) 40 mg tablet 1 every day for cholesterol (replaces simvastatin)  Patient taking differently: Take 40 mg by mouth daily. Indications: combined high blood cholesterol and triglyceride level 2/1/19   Evelina Wagner MD   VITAMIN D2 50,000 unit capsule 1 qweek for Vitamin D replacement  Patient taking differently: Take 50,000 Units by mouth every Tuesday. 6/11/18   Evelina Wagner MD   azelastine (ASTELIN) 137 mcg (0.1 %) nasal spray Use in each nostril bid  Patient taking differently: 2 Sprays by Both Nostrils route two (2) times a day. 6/1/18   Evelina Wagner MD   azelastine (OPTIVAR) 0.05 % ophthalmic solution Use in affected eye bid to tid for allergies  Patient taking differently: Administer 1 Drop to both eyes two (2) times a day. 6/1/18   Evelina Wagner MD   insulin NPH (HUMULIN N NPH U-100 INSULIN) 100 unit/mL injection 100 to 150 units bid per sliding scale   DX E11.65  Indications: type 2 diabetes mellitus  Patient taking differently: by SubCUTAneous route Before breakfast and dinner. 100 units or  Adjusts  according to glucose per sliding scale  Indications: type 2 diabetes mellitus 6/1/18   Evelina Wagner MD   Insulin Syringe-Needle U-100 (BD INSULIN SYRINGE) 1 mL 28 gauge x 1/2\" syrg Use 5 x a day. Dx E11.65  Indications: 5x a day 6/1/18   Evelina Wagner MD   glucose blood VI test strips (ONETOUCH ULTRA TEST) strip Use bid to tid   DX: E 11.65   Disp with lancets of formulary  Indications: dispense with lancets 6/1/18   Evelina Wagner MD   bumetanide (BUMEX) 1 mg tablet 1 to 2 every day for fluid  Patient taking differently: Take  by mouth daily. 1 to 2 every day for fluid 2/23/18   Evelina Wagner MD   potassium chloride (KLOR-CON) 10 mEq tablet 1 every day for potassium  Patient taking differently: Take 10 mEq by mouth daily.  2/23/18   Evelina Wagner MD   DULoxetine (CYMBALTA) 60 mg capsule Take 1 Cap by mouth daily. Indications: ANXIETY WITH DEPRESSION, NEUROPATHIC PAIN 18   Albertina Reyes MD   lancets [facility] DELICA LANCETS) 30 gauge misc Use up to tid   Dx E11.65 18   Albertina Reyes MD   losartan (COZAAR) 100 mg tablet 1 every day for BP  Indications: hypertension  Patient taking differently: Take 100 mg by mouth daily. Indications: high blood pressure 18   Albertina Reyes MD   Blood-Glucose Meter [facility] [place]) monitoring kit Use bid to tid   DX E11.65  If another product preferred on formulary please let me know which to select from. jlb 17   Albertina Reyes MD   fexofenadine-pseudoephedrine (ALLEGRA-D 24) 180-240 mg per tablet Take 1 Tab by mouth daily as needed. Indications: ALLERGIC RHINITIS 16   Mary Irwin MD   aspirin 81 mg chewable tablet Take 1 Tab by mouth daily. 16   Mary Irwin MD   polyethylene glycol (MIRALAX) 17 gram packet Take 1 Packet by mouth daily. 16   Mary Irwin MD   hydrocortisone (ANUSOL-HC) 2.5 % rectal cream Insert  into rectum four (4) times daily. 16   Annie Fink PA-C   nitroglycerin (NITROSTAT) 0.4 mg SL tablet 1 Tab by SubLINGual route as needed for Chest Pain. 16   Ian Rubio MD   fluticasone (FLONASE) 50 mcg/actuation nasal spray 1 spray IEN every day to BID  Patient taking differently: 1 Spray by Both Nostrils route daily. 10/14/15   Albertina Reyes MD     No Known Allergies   Social History     Tobacco Use    Smoking status: Former Smoker     Last attempt to quit: 1986     Years since quittin.5    Smokeless tobacco: Never Used   Substance Use Topics    Alcohol use: No     Comment: quit      Family History   Problem Relation Age of Onset    No Known Problems Mother     Coronary Artery Disease Father     No Known Problems Sister       Review of Systems  A comprehensive review of systems was negative except for that written in the HPI.     Objective:     No data found.  There were no vitals taken for this visit. General:  Alert, cooperative, no distress, appears stated age. Head:  Normocephalic, without obvious abnormality, atraumatic. Back:   Symmetric, no curvature. ROM normal. No CVA tenderness. Lungs:   Clear to auscultation bilaterally. Chest wall:  No tenderness or deformity. Heart:  Regular rate and rhythm, S1, S2 normal, no murmur, click, rub or gallop. Extremities: Extremities normal, atraumatic, no cyanosis or edema. Pulses: 2+ and symmetric all extremities. Skin: Skin color, texture, turgor normal. No rashes or lesions   Lymph nodes: Cervical, supraclavicular, and axillary nodes normal.   Neurologic: CNII-XII intact. Normal strength, sensation and reflexes throughout. Assessment:     Principal Problem:    Closed displaced transverse fracture of shaft of left humerus (7/17/2019)        Plan:     The various methods of treatment have been discussed with the patient and family. PATIENT HAS EXHAUSTED NON-OPERATIVE MODALITIES     After consideration of risks, benefits and other options for treatment, the patient has consented to surgical intervention.     SEE OFFICE NOTE    Josh Veloz MD

## 2019-07-18 NOTE — PERIOP NOTES
Dr Kavitha Kwon:      Patient: Alannah Gordon, DOS 7/22/19    During a recent visit to the surgical preadmission testing center, the above mentioned patient was found to have a non-fasting blood glucose level of 263 mg/dL. This may indicate inadequate diabetic management and raises concerns that the patient is not medically optimized for surgery. It is our standard practice to postpone elective surgery for patients who present fasting blood glucose level >300 mg/dL on the day of their procedure. The patient has been advised of this policy and counseled on the importance of glucose control. We feel that this patient is at increased risk of cancellation; however, their blood glucose may be in acceptable range when they are NPO. Therefore, we will leave the decision to you whether to delay the surgery and refer the patient to their primary care provider or keep them as currently scheduled. Our goal is to prevent as many delays and cancellations as possible while ensuring patient safety.     Sincerely,    Niharika Greenwood [facility]

## 2019-07-18 NOTE — ANESTHESIA PREPROCEDURE EVALUATION
Relevant Problems   No relevant active problems       Anesthetic History   No history of anesthetic complications            Review of Systems / Medical History  Patient summary reviewed and pertinent labs reviewed    Pulmonary        Sleep apnea (4 liters oxygen q 24): CPAP           Neuro/Psych   Within defined limits           Cardiovascular    Hypertension: well controlled      CHF  Dysrhythmias : atrial flutter  Past MI, CAD, CABG (x 5; 2004) and hyperlipidemia    Exercise tolerance: <4 METS: walker  Comments: Has had multiple falls recently and she is routinely calling fire department to get 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 He had a fall on 6/25 and refused to go to hospital.  He then became confused and fell again and fx left humerus. Originally treated with immobilization, now fx is worse. In rehab now. CM 35-40%  Sees Shannon Lovett and he ok'd off coumadin for 5 days. GI/Hepatic/Renal  Within defined limits       Renal disease (stage 3): CRI and stones       Endo/Other    Diabetes: well controlled, type 2, using insulin    Morbid obesity (Supramorbid Obesity)     Other Findings   Comments: Acute metabolic encephalopathy    Admitted 7/4 with pneumonia; completed antibiotic x 5 days         Physical Exam    Airway  Mallampati: II  TM Distance: 4 - 6 cm  Neck ROM: normal range of motion   Mouth opening: Normal     Cardiovascular    Rhythm: regular  Rate: normal         Dental  No notable dental hx       Pulmonary  Breath sounds clear to auscultation               Abdominal  GI exam deferred       Other Findings            Anesthetic Plan    ASA: 4  Anesthesia type: general      Post-op pain plan if not by surgeon: peripheral nerve block single    Induction: Intravenous  Anesthetic plan and risks discussed with: Patient and Spouse      Patient/facility did not follow instructions given and patient took Coumadin up until yesterday. INR 2.2. Blood Glc 266. Treating Glc with Regular and Lispro Insulin.  Will Type and Screen patient given coagulopathy. Discusses possibility of postop ventilation with patient and wife.

## 2019-07-18 NOTE — PERIOP NOTES
Patient arrived via stretcher with wife. Dr. Washington Sheets here to see patient and wife. Dr. Washington Sheets reviewed discharge summaries from 6/29/19 & 7/4//19, last cardiology office note dated 1/30/19, Echo dated 11/9/16, Stress test dated 3/19/18 and EKG's dated 1/30/19 & 6/25/19. CBC, CMP, Mag, PT, PTT and MRSA nasal swab done. Unable to obtain a UA due to pt having a de la cruz; Will have to be collected on DOS. Reviewed medical history and medication sheet from [facility] rehab with patient's wife. Partial PAT assessment done due to pt being in extreme pain. Wife will be called to complete assessment. Guide to surgery, Supriya-hex and instructions given to wife.

## 2019-07-18 NOTE — PERIOP NOTES
BUN elevated and Hgb 9.6. Will have anesthesia review abnormal lab results. Lab results faxed to surgeon. All other lab results within anesthesia guidelines. Recent Results (from the past 12 hour(s))   CBC W/O DIFF    Collection Time: 07/18/19  3:56 PM   Result Value Ref Range    WBC 9.2 4.3 - 11.1 K/uL    RBC 4.69 4.23 - 5.6 M/uL    HGB 9.6 (L) 13.6 - 17.2 g/dL    HCT 33.7 (L) 41.1 - 50.3 %    MCV 71.9 (L) 79.6 - 97.8 FL    MCH 20.5 (L) 26.1 - 32.9 PG    MCHC 28.5 (L) 31.4 - 35.0 g/dL    RDW 21.7 (H) 11.9 - 14.6 %    PLATELET 356 902 - 983 K/uL    MPV 10.8 9.4 - 12.3 FL    ABSOLUTE NRBC 0.00 0.0 - 0.2 K/uL   MAGNESIUM    Collection Time: 07/18/19  3:56 PM   Result Value Ref Range    Magnesium 1.6 (L) 1.8 - 2.4 mg/dL   METABOLIC PANEL, COMPREHENSIVE    Collection Time: 07/18/19  3:56 PM   Result Value Ref Range    Sodium 137 136 - 145 mmol/L    Potassium 4.3 3.5 - 5.1 mmol/L    Chloride 97 (L) 98 - 107 mmol/L    CO2 34 (H) 21 - 32 mmol/L    Anion gap 6 (L) 7 - 16 mmol/L    Glucose 263 (H) 65 - 100 mg/dL    BUN 43 (H) 8 - 23 MG/DL    Creatinine 1.35 0.8 - 1.5 MG/DL    GFR est AA >60 >60 ml/min/1.73m2    GFR est non-AA 55 (L) >60 ml/min/1.73m2    Calcium 8.7 8.3 - 10.4 MG/DL    Bilirubin, total 0.6 0.2 - 1.1 MG/DL    ALT (SGPT) 26 12 - 65 U/L    AST (SGOT) 27 15 - 37 U/L    Alk.  phosphatase 269 (H) 50 - 136 U/L    Protein, total 7.4 6.3 - 8.2 g/dL    Albumin 2.4 (L) 3.2 - 4.6 g/dL    Globulin 5.0 (H) 2.3 - 3.5 g/dL    A-G Ratio 0.5 (L) 1.2 - 3.5     PROTHROMBIN TIME + INR    Collection Time: 07/18/19  3:56 PM   Result Value Ref Range    Prothrombin time 23.4 (H) 11.7 - 14.5 sec    INR 2.1     PTT    Collection Time: 07/18/19  3:56 PM   Result Value Ref Range    aPTT 44.1 (H) 24.7 - 39.8 SEC

## 2019-07-19 NOTE — PERIOP NOTES
Dr. Amish Skinner, anesthesia, reviewed BUN, HGB and glucose from 7/18/19. Aware T&C ordered by surgeon. Ok to proceed. Chart to preop.

## 2019-07-22 PROBLEM — S31.000A SACRAL WOUND: Status: ACTIVE | Noted: 2019-01-01

## 2019-07-22 PROBLEM — S42.321A CLOSED DISPLACED TRANSVERSE FRACTURE OF SHAFT OF RIGHT HUMERUS: Status: ACTIVE | Noted: 2019-01-01

## 2019-07-22 PROBLEM — I95.9 HYPOTENSION: Status: ACTIVE | Noted: 2019-01-01

## 2019-07-22 PROBLEM — R57.1 HYPOVOLEMIC SHOCK (HCC): Status: ACTIVE | Noted: 2019-01-01

## 2019-07-22 NOTE — DISCHARGE SUMMARY
[address] [facility] Discharge Summary      Patient ID:  Cyndi Grant  [id number]  89 y.o.  1950    Allergies: Patient has no known allergies.     Admit date: 7/22/2019    Discharge date and time: 7/25/2019    Admitting Physician: Jas Gutierrez DO     Discharge Physician: Martin Stanton MD      * Admission Diagnoses: Displaced transverse fracture of shaft of humerus, right arm, initial encounter for closed fracture [S42.321A]  Closed displaced transverse fracture of shaft of right humerus, initial encounter [S42.321A]  Closed displaced transverse fracture of shaft of left humerus [S42.322A]  Hypotension [I95.9]    * Discharge Diagnoses:   Hospital Problems as of 7/25/2019 Date Reviewed: 2/8/2019          Codes Class Noted - Resolved POA    Closed displaced transverse fracture of shaft of right humerus ICD-10-CM: S42.321A  ICD-9-CM: 812.21  7/22/2019 - Present Unknown        Hypotension ICD-10-CM: I95.9  ICD-9-CM: 458.9  7/22/2019 - Present Unknown        * (Principal) Hypovolemic shock ([facility] ICD-10-CM: R57.1  ICD-9-CM: 785.59  7/22/2019 - Present Unknown        Sacral wound ICD-10-CM: S31.000A  ICD-9-CM: 959.19  7/22/2019 - Present Unknown        Closed displaced transverse fracture of shaft of left humerus ICD-10-CM: X36.266B  ICD-9-CM: 812.21  7/17/2019 - Present Yes        JASON (obstructive sleep apnea) (Chronic) ICD-10-CM: G47.33  ICD-9-CM: 327.23  4/7/2016 - Present Yes        Diabetes mellitus type 2, controlled ([facility] (Chronic) ICD-10-CM: E11.9  ICD-9-CM: 250.00  12/15/2015 - Present Yes        Essential hypertension, benign (Chronic) ICD-10-CM: I10  ICD-9-CM: 401.1  9/15/2015 - Present Yes        CKD (chronic kidney disease) stage 3, GFR 30-59 ml/min (HCC) (Chronic) ICD-10-CM: N18.3  ICD-9-CM: 585.3  10/18/2010 - Present Yes        Morbid obesity ([address] (Chronic) ICD-10-CM: E66.01  ICD-9-CM: 278.01  10/18/2010 - Present Yes              Surgeon: Martin Stanton MD      Preoperative Medical Clearance: yes    * Procedure: Procedure(s):  LEFT HUMERUS SHAFT  OPEN REDUCTION INTERNAL FIXATION            Perioperative Antibiotics: Ancef  ___                                                Vancomycin  _x__          Post Op complications: none      * Discharge Condition: good  Wound appears to be healing without any evidence of infection.          * Discharged [facility]: [facility]    * Follow-up Care/Discharge instructions:  - Resume pre hospital diet            - Resume home medications per medical continuation form     CONTINUE PHYSICAL THERAPY  Sling left arm  - Follow up in office as scheduled       Signed:  Chelsea Guzman MD  7/25/2019  2:39 PM

## 2019-07-22 NOTE — ANESTHESIA POSTPROCEDURE EVALUATION
Procedure(s):  LEFT HUMERUS SHAFT  OPEN REDUCTION INTERNAL FIXATION .     general, regional    Anesthesia Post Evaluation      Multimodal analgesia: multimodal analgesia used between 6 hours prior to anesthesia start to PACU discharge  Patient location during evaluation: bedside  Patient participation: complete - patient participated  Level of consciousness: awake and alert  Pain score: 3  Pain management: adequate  Airway patency: patent  Anesthetic complications: no  Cardiovascular status: acceptable and hypotensive  Respiratory status: acceptable and nasal cannula  Hydration status: hypovolemic  Post anesthesia nausea and vomiting:  none      Vitals Value Taken Time   BP 89/45 7/22/2019  7:14 PM   Temp 36.8 °C (98.2 °F) 7/22/2019  6:14 PM   Pulse 55 7/22/2019  7:14 PM   Resp 16 7/22/2019  7:14 PM   SpO2 97 % 7/22/2019  7:14 PM

## 2019-07-22 NOTE — PERIOP NOTES
TRANSFER - OUT REPORT:    Verbal report given to [facility] RN on Russell Vazquez  being transferred to [facility] for routine post - op       Report consisted of patients Situation, Background, Assessment and   Recommendations(SBAR). Information from the following report(s) SBAR was reviewed with the receiving nurse. Opportunity for questions and clarification was provided.       Patient transported with:   O2 @ 4 liters  Melvin Cummings

## 2019-07-22 NOTE — H&P
Date of Surgery Update:  Terrie Harrison was seen and examined. History and physical has been reviewed. The patient has been examined.  There have been no significant clinical changes since the completion of the originally dated History and Physical.    Signed By: Marleen Howell MD     July 22, 2019 12:48 PM

## 2019-07-23 NOTE — PROGRESS NOTES
Orthopedic Joint Progress Note    2019  Admit Date: 2019  Admit Diagnosis: Displaced transverse fracture of shaft of humerus, right arm, initial encounter for closed fracture [S42.321A]  Closed displaced transverse fracture of shaft of right humerus, initial encounter [S42.321A]  Closed displaced transverse fracture of shaft of left humerus [S42.322A]  Hypotension [I95.9]    1 Day Post-Op    Subjective: awake some confusion     David Winters     Review of Systems: Pertinent items are noted in HPI. Objective:     PT/OT:     PATIENT MOBILITY                           Vital Signs:    Blood pressure 112/84, pulse 85, temperature 98 °F (36.7 °C), resp. rate 22, height 6' (1.829 m), weight 152 kg (335 lb 3.2 oz), SpO2 91 %.   Temp (24hrs), Av.2 °F (36.8 °C), Min:98 °F (36.7 °C), Max:98.3 °F (36.8 °C)      Pain Control:   Pain Assessment  Pain Scale 1: Numeric (0 - 10)  Pain Intensity 1: 0    Meds:  Current Facility-Administered Medications   Medication Dose Route Frequency    alcohol 62% (NOZIN) nasal  1 Ampule  1 Ampule Topical Q12H    celecoxib (CELEBREX) capsule 200 mg  200 mg Oral ONCE    0.9% sodium chloride infusion  75 mL/hr IntraVENous CONTINUOUS    sodium chloride (NS) flush 5-40 mL  5-40 mL IntraVENous Q8H    sodium chloride (NS) flush 5-40 mL  5-40 mL IntraVENous PRN    bisacodyl (DULCOLAX) suppository 10 mg  10 mg Rectal DAILY PRN    sodium phosphate (FLEET'S) enema 1 Enema  1 Enema Rectal PRN    promethazine (PHENERGAN) tablet 25 mg  25 mg Oral Q4H PRN    docusate sodium (COLACE) capsule 100 mg  100 mg Oral BID    ferrous sulfate tablet 325 mg  1 Tab Oral BID WITH MEALS    temazepam (RESTORIL) capsule 15 mg  15 mg Oral QHS PRN    tuberculin injection 5 Units  5 Units IntraDERMal ONCE    HYDROmorphone (PF) (DILAUDID) injection 1 mg  1 mg IntraVENous Q1H PRN    oxyCODONE IR (ROXICODONE) tablet 5 mg  5 mg Oral Q3H PRN    oxyCODONE IR (ROXICODONE) tablet 10 mg 10 mg Oral Q4H PRN    oxyCODONE IR (ROXICODONE) tablet 15 mg  15 mg Oral Q3H PRN    magnesium sulfate 2 g/50 ml IVPB (premix or compounded)  2 g IntraVENous ONCE    vancomycin (VANCOCIN) 2000 mg in  ml infusion  2,000 mg IntraVENous Q12H    0.9% sodium chloride infusion 250 mL  250 mL IntraVENous PRN    PHENYLephrine (GILMAR-SYNEPHRINE) 40 mg in 250 mL NS infusion   mcg/min IntraVENous TITRATE    insulin lispro (HUMALOG) injection   SubCUTAneous AC&HS    albuterol-ipratropium (DUO-NEB) 2.5 MG-0.5 MG/3 ML  3 mL Nebulization Q4H RT    LORazepam (ATIVAN) tablet 0.5 mg  0.5 mg Oral Q8H PRN    atorvastatin (LIPITOR) tablet 40 mg  40 mg Oral QHS    azelastine (ASTELIN) 137mcg/spray nasal spray (Patient Supplied)  1 Spray Both Nostrils BID    azelastine (OPTIVAR) 0.05 % ophthalmic solution drop 1 Drop (Patient Supplied)  1 Drop Both Eyes BID PRN    simethicone (MYLICON) tablet 80 mg  80 mg Oral QID PRN        LAB:    Lab Results   Component Value Date/Time    INR 2.2 07/23/2019 01:29 AM    INR 2.4 07/22/2019 06:42 PM    INR 2.1 07/18/2019 03:56 PM    INR (POC) 2.2 (H) 07/22/2019 12:43 PM     Lab Results   Component Value Date/Time    HGB 10.0 (L) 07/23/2019 01:29 AM    HGB 9.3 (L) 07/22/2019 06:42 PM    HGB 9.6 (L) 07/18/2019 03:56 PM       Wound Sacral/coccyx (Active)   Dressing Status  Breakthrough drainage 7/23/2019  2:33 AM   Dressing Type  Collagens/cell matrix 7/23/2019  2:33 AM   Non-Pressure Injury Partial thickness (epider/derm) 7/23/2019  2:33 AM   Pressure Injury Stage 2 7/23/2019  2:33 AM   Number of days: 1202       Wound Shoulder Left (Active)   Dressing Status Clean, dry, and intact 7/23/2019  5:01 AM   Dressing Type Compression dressing 7/22/2019  8:30 PM   Number of days: 1       [REMOVED] Wound Pretibial Distal;Left (Removed)   Number of days: 2       [REMOVED] Wound Leg lower Left;Posterior (Removed)   Number of days: 2         Physical Exam:  No significant changes    Assessment: Principal Problem:    Hypovolemic shock ([facility] (7/22/2019)    Active Problems:    CKD (chronic kidney disease) stage 3, GFR 30-59 ml/min ([address] (10/18/2010)      Morbid obesity ([address] (10/18/2010)      Essential hypertension, benign (9/15/2015)      Diabetes mellitus type 2, controlled ([facility] (12/15/2015)      JASON (obstructive sleep apnea) (4/7/2016)      Closed displaced transverse fracture of shaft of left humerus (7/17/2019)      Closed displaced transverse fracture of shaft of right humerus (7/22/2019)      Hypotension (7/22/2019)      Sacral wound (7/22/2019)         Plan:     Continue PT/OT/Rehab  Magnesium repleted  Creatinine up to 1.9 observe  INR 2.2, hemoglobin 10  May restart coumadin as needed  Sacral decubiti wound care consult  Observe  Continue care  Transfer to rehab when stable    Patient Expects to be Discharged to[de-identified] Assisted living

## 2019-07-23 NOTE — OP NOTES
[place]  OPERATIVE REPORT    Name:  Nevin Clement  MR#:  [id number]  :  1950  ACCOUNT #:  [de-identified]  DATE OF SERVICE:  2019    PREOPERATIVE DIAGNOSIS:  Displaced transverse midshaft left humeral shaft fracture. POSTOPERATIVE DIAGNOSIS:  Displaced transverse midshaft left humeral shaft fracture. PROCEDURE PERFORMED:  Open reduction internal fixation of left humeral shaft fracture. SURGEON:  Douglas Baptiste. Willam Pederson MD        PATHOLOGY:  Displaced midshaft humeral shaft fracture, transverse. CPT CODE:  60280. ICD-10 CODE:  S42.322. Please see the hardware record. ANESTHESIA:  General with supraclavicular block. COMPLICATIONS:  None. IMPLANTS:  A 12 hole 4.5 plate was utilized. ESTIMATED BLOOD LOSS:  300 mL. INDICATIONS:  The patient is a 42-year-old gentleman with multiple medical issues including cardiac disease with atrial flutter on Coumadin, diabetes mellitus, chronic kidney disease. The patient fell sustaining a displaced left humeral transverse shaft fracture. Given the radiographic appearance and the displacement, he is now brought to the operative suite for operative intervention. PROCEDURE:  Following identification of the patient, the patient was taken to the operative suite following administration of general anesthesia, a supraclavicular block for postop pain control and 3 g of IV Ancef and 1 g of IV vancomycin due to the fact that he is MRSA positive. The patient was then positioned in the modified beach-chair fashion. The patient has an indwelling Cardenas catheter. The patient's left shoulder and arm were then prepped and draped in sterile fashion. At this point, an incision extending from just lateral to the coracoid to the lateral epicondyle of the distal humerus was then performed. It was an extensile deltopectoral interval taken down to the lateral aspect of the distal humerus. The skin was incised.   Subcutaneous tissue was then dissected down to the cephalic vein proximally. Cephalic vein was then dissected proximally and distally and retracted laterally with deltoid. Pec major and strap muscles were retracted medially. Axillary nerve was protected. The fracture hematoma was then identified. Fracture hematoma was then irrigated. The exposure was then carried out distally from the deltoid insertion over the anterolateral aspect of the arm. The dissection was then carried out from distal to proximal.  The intramuscular septum between the anterior and lateral compartments was then identified. This was then carefully dissected free. The radial nerve was identified. It was very carefully mobilized, proximally and distally and protected throughout the case. A vessel loop was placed around the nerve for identification purposes. At this point, dissection was carried up and down the humeral shaft. The fracture site was visualized. The fracture was then completely mobilized. It was completely displaced and angulated. There were some early attempts at fracture callus formation. The fracture site was then completely mobilized. The fracture was reduced. It was elected to utilize a 12 hole 4.5 plate, 8 cortices proximally and 16 cortices distally. The fracture was reduced. The plate was applied. At this point, multiple screws were placed in all drill holes. These were lagged and then locked to achieve contouring of the plate and the fracture was compressed as per AO standard protocol. The radial nerve was identified and protected throughout the procedure and was mobilized so that the screws distally and the plate distally did not compromise the nerve. Once all fixation was then achieved again the radial nerve and axillary nerve were intact. The wound was then irrigated. The fracture was imaged in all plains. There was an anatomic reduction of the fracture with the hardware in excellent position.   At this point, the wound was then irrigated, deltopectoral interval was approximated using #5 Mersilene sutures. The skin was closed with #0 Vicryl figure-of-eight sutures and staples. A sterile dressing was applied. The patient was then transferred to the recovery room in stable condition. The fracture was visualized in all planes and anatomically reduced. The axial and radial nerves were protected throughout the procedure. Tato Tabor MD      [id number]  D:  07/22/2019 20:19  T:  07/23/2019 0:15  JOB #:  [id number]  CC:   Keya Almeida MD

## 2019-07-23 NOTE — PROGRESS NOTES
Patient turned during bath. Dressing removed from sacral promontory to find a skin breakdown  Site is round quarter size and broken. Around that site redness. Breakdown on scrotal sack. Patient has crusty scaly skin. He picks at skin.   Shoulder dressing sling and swath intact

## 2019-07-23 NOTE — PROGRESS NOTES
Cardenas to bsd with clouding stiven urine. Catheter care done at the arrival of patient to ICU. He has dressing on his scrotal sack and it is tender to him. His skin is scaly and flaking in bed. orthopedic dressing to the left arm is intact and the color is returning to hid fingers. 6 sec delay in cap refill in the left hand and warmer. Pulses pal in the left radial.  Denies pain but is confused  Reoriented to place and events.

## 2019-07-23 NOTE — PROGRESS NOTES
Problem: Mobility Impaired (Adult and Pediatric)  Goal: *Acute Goals and Plan of Care (Insert Text)  Description  (1.)Mr. Kentrell Maldonado will move from supine to sit and sit to supine , scoot up and down and roll side to side with MAXIMAL ASSIST within 7 treatment day(s). (2.)Mr. Kentrell Maldonado will transfer from sit to stand and stand to sit with maximal assist of 2 people within 7 treatment days. (3.)Mr. Kentrell Maldonado will perform seated static and dynamic balance activities with STAND BY ASSIST to improve safety within 7 day(s). (4.)Mr. Kentrell Maldonado will maintain NWB LUE throughout all functional mobility within 7 days. (5.)Mr. Kentrell Maldonado will participate in shoulder HEP with minimal assist per MD orders. ________________________________________________________________________________________________     Outcome: Progressing Towards Goal     PHYSICAL THERAPY: Daily Note, Treatment Day: Day of Assessment and PM 7/23/2019  INPATIENT: Hospital Day: 2  Payor: Melanie Aguiar / Plan: [employer] HMO / Product Type: HMO /      NAME/AGE/GENDER: Saroj Ji is a 68 y.o. male   PRIMARY DIAGNOSIS: Displaced transverse fracture of shaft of humerus, right arm, initial encounter for closed fracture [S42.321A]  Closed displaced transverse fracture of shaft of right humerus, initial encounter Moriah Romero  Closed displaced transverse fracture of shaft of left humerus [S42.322A]  Hypotension [I95.9] Hypovolemic shock (HCC)   Hypovolemic shock (HCC)    Procedure(s) (LRB):  LEFT HUMERUS SHAFT  OPEN REDUCTION INTERNAL FIXATION  (Left)  1 Day Post-Op  ICD-10: Treatment Diagnosis:   · Pain in Left Shoulder (M25.512)  · Stiffness of Left Shoulder, Not elsewhere classified (M25.612)  · Generalized Muscle Weakness (M62.81)  · Difficulty in walking, Not elsewhere classified (R26.2)  · History of falling (Z91.81)   Precaution/Allergies:  Patient has no known allergies.    PER MD ORDERS  Active and passive range of motion left elbow hand  Gentle pendulums  No other shoulder motion  nwb ue left  wbat ble  Sling left shoulder     ASSESSMENT:     Mr. Tanya Gallegos presents supine on contact in ICU, s/p above procedure. Pt had a fall on 6/25th when he sustained this fracture that has been repaired. It was not his first fall, has had several recently. Also with increasing confusion prior to these admissions. He was admitted to hospital on that fall in June. Left hospital and went to a rehab facility. Per family he came from rehab to the hospital. Wife states at rehab had not been walking or sitting on the side of the bed very much at all. Pt with a mitt on his right hand to keep him from pulling out his IV-some confusion. Assessed supine to sit-was max to total assist of 3 people. Not able to sit upright without support and poor tolerance for activity-asked to lay down fairly quickly. Worked on the exercises prescribed by ortho MD. This pt presents with generalized weakness and decreased mobility. Will need skilled PT interventions to maximize independence with mobility and work on shoulder HEP. Discussed with SW, pt will need extensive rehab. In pm-checked with RN, they had just finished turning him several times to look at his skin-have ordered a mattress for him. On entry pt appeared worn out from this. He was agreeable to his exercises. Worked on MD ordered HEP with verbal and manual cues. All exercises still active assisted. He stayed in supine with sling in place, needs in reach and spouse at bedside. This section established at most recent assessment   PROBLEM LIST (Impairments causing functional limitations):  1. Decreased Roann with Bed Mobility  2. Decreased Roann with Transfers  3. Decreased Roann with Ambulation   4. Decreased Roann with shoulder HEP   INTERVENTIONS PLANNED: (Benefits and precautions of physical therapy have been discussed with the patient.)  1. Bed Mobility Training  2.  Transfer Training  3. Gait Training  4. Therapeutic Exercises per MD orders  5. Modalities for Pain     TREATMENT PLAN: Frequency/Duration: twice daily for duration of hospital stay  Rehabilitation Potential For Stated Goals: Good     RECOMMENDED REHABILITATION/EQUIPMENT: (at time of discharge pending progress): Continue Skilled Therapy, Rehab and Discussed with Case Management. HISTORY:   History of Present Injury/Illness (Reason for Referral):  Pt s/p above procedure  Past Medical History/Comorbidities:   Mr. Edwin Peña  has a past medical history of Anxiety, Arthritis, Atrial flutter ([address] (2/3/2016), CAD (coronary artery disease), Cardiomyopathy ([address] Chronic systolic heart failure ([address] (10/20/2010), CKD (chronic kidney disease) stage 3, GFR 30-59 ml/min ([address] (10/18/2010), Diabetic peripheral neuropathy ([address] (9/15/2015), Dyslipidemia (10/18/2010), Edema, Essential hypertension, benign (09/15/2015), H/O falling, Insomnia (9/15/2015), Mixed hyperlipidemia (9/15/2015), Morbid obesity ([address] (10/18/2010), Nephrolithiasis, Oxygen dependent, PAD (peripheral artery disease) ([address] Peripheral neuropathy, Pneumonia (07/04/2019), Renal insufficiency, Right heart failure ([address] S/P CABG (coronary artery bypass graft), Sleep apnea, and Type II or unspecified type diabetes mellitus without mention of complication, uncontrolled ([address] (09/15/2015). Mr. Edwin Peña  has a past surgical history that includes hx urological; hx cataract removal (Bilateral); pr cardiac surg procedure unlist; hx angioplasty; and hx heart catheterization.   Social History/Living Environment:   Home Environment: Private residence(but was admitted from SNF)  # Steps to Enter: 2  One/Two Story Residence: One story  Living Alone: No  Support Systems: Spouse/Significant Other/Partner  Patient Expects to be Discharged to[de-identified] Skilled nursing facility  Current DME Used/Available at Home: [employer], straight, Oxygen, portable, Shower chair, Walker, rollator, Walker, rolling, Wheelchair  Tub or Shower Type: Tub/Shower combination  Prior Level of Function/Work/Activity:  Pt was at a rehab facility and prior to that hospital. Before initial hospital admission lived with wife, walked about 50 feet with walker, needed assist for ADLs and was on home oxygen   Number of Personal Factors/Comorbidities that affect the Plan of Care: 3+: HIGH COMPLEXITY   EXAMINATION:   Most Recent Physical Functioning:   Gross Assessment:  AROM: Generally decreased, functional(except left upper extremity not assessed)  Strength: Generally decreased, functional(except left upper extremity)               Posture:     Balance:  Sitting: Impaired  Sitting - Static: Poor (constant support)  Sitting - Dynamic: Poor (constant support) Bed Mobility:  Supine to Sit: Maximum assistance; Total assistance; Additional time;Assist x2; Other (comment)(plus one more person)  Sit to Supine: Maximum assistance; Total assistance;Assist x2; Other (comment)(plus one more person)  Scooting: Total assistance  Wheelchair Mobility:     Transfers:     Gait:            Body Structures Involved:  1. Joints  2. Muscles Body Functions Affected:  1. Movement Related Activities and Participation Affected:  1. Mobility  2. Self Care   Number of elements that affect the Plan of Care: 4+: HIGH COMPLEXITY   CLINICAL PRESENTATION:   Presentation: Stable and uncomplicated: LOW COMPLEXITY   CLINICAL DECISION MAKIN [facility] [address] [id number] AM-PAC 6 Clicks   Basic Mobility Inpatient Short Form  How much difficulty does the patient currently have. .. Unable A Lot A Little None   1. Turning over in bed (including adjusting bedclothes, sheets and blankets)? ? 1   ? 2   ? 3   ? 4   2. Sitting down on and standing up from a chair with arms ( e.g., wheelchair, bedside commode, etc.)   ? 1   ? 2   ? 3   ? 4   3.   Moving from lying on back to sitting on the side of the bed?   ? 1   ? 2   ? 3   ? 4   How much help from another person does the patient currently need. .. Total A Lot A Little None   4. Moving to and from a bed to a chair (including a wheelchair)? ? 1   ? 2   ? 3   ? 4   5. Need to walk in hospital room? ? 1   ? 2   ? 3   ? 4   6. Climbing 3-5 steps with a railing? ? 1   ? 2   ? 3   ? 4   © 2007, Trustees of [facility], under license to [employer]. All rights reserved      Score:  Initial: 8 Most Recent: X (Date: -- )    Interpretation of Tool:  Represents activities that are increasingly more difficult (i.e. Bed mobility, Transfers, Gait). Medical Necessity:     · Patient is expected to demonstrate progress in strength, balance, coordination and functional technique  ·  to decrease assistance required with functional mobility   · . Reason for Services/Other Comments:  · Patient continues to require skilled intervention due to inability to complete functional mobility indpendently   · . Use of outcome tool(s) and clinical judgement create a POC that gives a: Clear prediction of patient's progress: LOW COMPLEXITY            TREATMENT:   (In addition to Assessment/Re-Assessment sessions the following treatments were rendered)   Pre-treatment Symptoms/Complaints:  pain  Pain: Initial:     Having pain, said he had meds Post Session:  still hurting     Therapeutic Exercise: (10 Minutes):  Exercises per grid below to improve mobility and strength. Required moderate verbal and manual cues to promote proper body alignment and promote proper body posture. Progressed repetitions as indicated.      Date:  7/23/19 Date:   Date:     ACTIVITY/EXERCISE AM PM AM PM AM PM   Gripping 10 AA 15 AA       Wrist Flexion/Extension 10 AA 15 AA       Wrist Ulnar/Radial Deviation         Pronation/Supination 10 AA 15 AA       Elbow Flexion/Extension 10 AA 15 AA       Shoulder Flexion/Extension         Shoulder AB/ADduction         Shoulder IR/ER         Pulleys         Pendulums Unable until pt can stand        Shrugs         Isometric: Flexion         Extension         ABduction         ADduction         Biceps/Triceps                  B = bilateral; AA = active assistive; A = active; P = passive  Education:  ? Home Exercises  ? Sling Application   ? Movement Precautions   ? Pulleys   ? Use of Ice   ? Other:   Treatment/Session Assessment:    · Response to Treatment: pt tolerated fair, very weak  · Interdisciplinary Collaboration:   o Registered Nurse  o Rehabilitation Attendant  · After treatment position/precautions:   o Supine in bed  o Bed/Chair-wheels locked  o Bed in low position  o Call light within reach  o RN notified  o Family at bedside   · Compliance with Program/Exercises: Will assess as treatment progresses. · Recommendations/Intent for next treatment session:  Treatment next visit will focus on increasing Mr. Karimi Learn independence with bed mobility, transfers, gait training, strength/ROM exercises, modalities for pain, and patient education.    Total Treatment Duration:  PT Patient Time In/Time Out  Time In: 1420  Time Out: 4130 Jese Woodson, PT

## 2019-07-23 NOTE — PROGRESS NOTES
Care Management Interventions  Transition of Care Consult (CM Consult): Discharge Planning  Physical Therapy Consult: Yes  Occupational Therapy Consult: Yes  Current Support Network: Nursing Facility  Confirm Follow Up Transport: Other (see comment)  Plan discussed with Pt/Family/Caregiver: Yes  Discharge Location  Discharge Placement: Skilled nursing facility      Chart reviewed. PTA pt was residing at [facility]. Pt has been there since 6-30. BEATRIS spoke with Zita Beck who states they are planning on pt returning. Zita Beck is aware pt would benefit from an overlay mattress and states they can provide. Zita Beck will initiate precert ( pt has [employer], under his spouse and Medicare is secondary ). BEATRIS spoke with spouse who is agreeable to d/c plan.

## 2019-07-23 NOTE — PROGRESS NOTES
CDMP QUERY regarding type of anemia    Patient complex individual with displaced transverse left humeral shaft fracture on coumadin for atrial flutter with multiple chronic diseases  His anemia is complex and is related to anemia due to acute blood loss from fracture, expected blood loss at time of surgery, blood loss due to coumadin therapy for atrial flutter with elevated INR at time of surgery and anemia of chronic disease

## 2019-07-23 NOTE — CDMP QUERY
Pt admitted with displaced transverse fracture of shaft of humerus, left arm, initial encounter for closed fracture and has anemia documented. Please further specify type and acuity of anemia in the medical record.  Anemia due to acute blood loss   Anemia due to postoperative blood loss  (please specify if expected or complication of the surgery)   Anemia due to chronic disease, please specify disease   Dilutional anemia   Other, please specify   Clinically unable to determine    The medical record reflects the following:     Risk Factors: age, ORIF of L humerus on 7/22 with EBL of 300mls, DM2, A flutter, HTN, dementia, CHF, CAD, CKD      Clinical Indicators: per OP note written on 7/23 @ 0015 by Dr. Kayode Herring DIAGNOSIS:  Displaced transverse midshaft left humeral shaft fracture. PROCEDURE PERFORMED:  Open reduction internal fixation of left humeral shaft fracture. ESTIMATED BLOOD LOSS:  300 mL. \", per H&P written on 7/22 @ 2002 by Dr. Robina Newell \"CHF EF 50%- Hold ASA daily today in setting of acute blood loss. \", H&H-9.3/31.8 on 7/22     Treatment: 2 units PRBCs ordered and transfused H&H-9.3/31.8 on 7/22, after 2 units of PRBCs transfused H&H-9.4/29.7 on 7/23,  H&H Q6 starting on 7/23 @ 0100 until specified     Thanks,  NELSON PintoN, RN, CDS  Compliant Documentation Management Program  [phone]

## 2019-07-23 NOTE — PROGRESS NOTES
Warfarin dosing per pharmacist    Felix Josue is a 68 y.o. male. @[employer](57)@    @[facility](96)@    Indication:  atrial fibrillation    Goal INR:  2 - 3    Home dose:  5 mg Mon/Wed/Fri and 2.5 mg Tue/Thur/Sat/Sun    Risk factors or significant drug interactions: Other anticoagulants:      Daily Monitoring  Date  INR     Warfarin dose HGB              Notes  7/23  2.2  2.5 mg  9.4    Will re-start home dose of Warfarin: 5 mg MWF and 2.5 mg Tuesday, Thursday, Saturday and Sunday. Clinical pharmacist will continue to monitor closely.     Thank you,  Thalia WinslowD, Board Certified Pharmacotherapy Specialist

## 2019-07-23 NOTE — PROGRESS NOTES
Labs given to hospitalist   Orders received. Patient remains confused to time and to place but knows his name and wife's name.

## 2019-07-23 NOTE — PROGRESS NOTES
Problem: Diabetes Self-Management  Goal: *Disease process and treatment process  Description  Define diabetes and identify own type of diabetes; list 3 options for treating diabetes. Outcome: Progressing Towards Goal  Goal: *Incorporating nutritional management into lifestyle  Description  Describe effect of type, amount and timing of food on blood glucose; list 3 methods for planning meals. Outcome: Progressing Towards Goal  Goal: *Incorporating physical activity into lifestyle  Description  State effect of exercise on blood glucose levels. Outcome: Progressing Towards Goal  Goal: *Developing strategies to promote health/change behavior  Description  Define the ABC's of diabetes; identify appropriate screenings, schedule and personal plan for screenings. Outcome: Progressing Towards Goal  Goal: *Using medications safely  Description  State effect of diabetes medications on diabetes; name diabetes medication taking, action and side effects. Outcome: Progressing Towards Goal  Goal: *Monitoring blood glucose, interpreting and using results  Description  Identify recommended blood glucose targets  and personal targets. Outcome: Progressing Towards Goal  Goal: *Prevention, detection, treatment of acute complications  Description  List symptoms of hyper- and hypoglycemia; describe how to treat low blood sugar and actions for lowering  high blood glucose level. Outcome: Progressing Towards Goal  Goal: *Prevention, detection and treatment of chronic complications  Description  Define the natural course of diabetes and describe the relationship of blood glucose levels to long term complications of diabetes.   Outcome: Progressing Towards Goal  Goal: *Developing strategies to address psychosocial issues  Description  Describe feelings about living with diabetes; identify support needed and support network  Outcome: Progressing Towards Goal  Goal: *Insulin pump training  Outcome: Progressing Towards Goal  Goal: *Sick day guidelines  Outcome: Progressing Towards Goal  Goal: *Patient Specific Goal (EDIT GOAL, INSERT TEXT)  Outcome: Progressing Towards Goal     Problem: Patient Education: Go to Patient Education Activity  Goal: Patient/Family Education  Outcome: Progressing Towards Goal

## 2019-07-23 NOTE — PROGRESS NOTES
TRANSFER - OUT REPORT:    Verbal report given to KEE Collazo on Karel Bains  being transferred to [facility] for routine progression of care       Report consisted of patients Situation, Background, Assessment and   Recommendations(SBAR). Information from the following report(s) SBAR, OR Summary, Procedure Summary, Intake/Output, MAR, Accordion and Recent Results was reviewed with the receiving nurse. Lines:   Peripheral IV 07/22/19 Anterior;Right Forearm (Active)   Site Assessment Clean, dry, & intact 7/23/2019  5:01 AM   Phlebitis Assessment 0 7/23/2019  5:01 AM   Infiltration Assessment 0 7/23/2019  5:01 AM   Dressing Status Clean, dry, & intact 7/23/2019  5:01 AM        Opportunity for questions and clarification was provided.       Patient transported with:

## 2019-07-23 NOTE — WOUND CARE
Left buttock with 3x3x0.1xm open area pink base, surrounding skin is erythematous, blanchable, Right buttock with erythema, blanchable as well in 4x2cm area. Fold wear scrotum and perineum meet with irregular 1.5x1.5x0.1cm open area. Both open areas are consistent with moisture/ friction/ shear injury, possible pressure component as patient has not walked much in the last month per his spouses report. Per nursing report, egg crate was ordered, also Dr. Cherene Dakins asked for air mattress. All ICU mattresses in this facility are air mattress, however patient will transfer to med surg/ ortho so low air loss mattress will be added that can transfer with patient to med surg/ ortho floors. Recommend either using low air loss mattress or egg crate not both as the egg crate will decrease the effectiveness of the low air loss microclimate. Foam cushion or egg crate can be used to assist with offloading when in chair. Recommend zinc based barrier cream to right buttock, stephen anal areas, perineum and posterior scrotum areas and foam (allevyn) to buttocks every other day and prn. Will need SNF or home health for continued wound care. Continue frequent turning and offloading even with speciality mattress. Will monitor while in acute care.

## 2019-07-23 NOTE — PROGRESS NOTES
Problem: Diabetes Self-Management  Goal: *Disease process and treatment process  Description  Define diabetes and identify own type of diabetes; list 3 options for treating diabetes. 7/23/2019 0200 by Aparna Garcia RN  Outcome: Progressing Towards Goal  7/23/2019 0055 by Aparna Garcia RN  Outcome: Progressing Towards Goal  Goal: *Incorporating nutritional management into lifestyle  Description  Describe effect of type, amount and timing of food on blood glucose; list 3 methods for planning meals. 7/23/2019 0200 by Aparna Garcia RN  Outcome: Progressing Towards Goal  7/23/2019 0055 by Aparna Garcia RN  Outcome: Progressing Towards Goal  Goal: *Incorporating physical activity into lifestyle  Description  State effect of exercise on blood glucose levels. 7/23/2019 0200 by Aparna Garcia RN  Outcome: Progressing Towards Goal  7/23/2019 0055 by Aparna Garcia RN  Outcome: Progressing Towards Goal  Goal: *Developing strategies to promote health/change behavior  Description  Define the ABC's of diabetes; identify appropriate screenings, schedule and personal plan for screenings. 7/23/2019 0200 by Aparna Garcia RN  Outcome: Progressing Towards Goal  7/23/2019 0055 by Aparna Garcia RN  Outcome: Progressing Towards Goal  Goal: *Using medications safely  Description  State effect of diabetes medications on diabetes; name diabetes medication taking, action and side effects. 7/23/2019 0200 by Aparna Garcia RN  Outcome: Progressing Towards Goal  7/23/2019 0055 by Aparna Garcia RN  Outcome: Progressing Towards Goal  Goal: *Monitoring blood glucose, interpreting and using results  Description  Identify recommended blood glucose targets  and personal targets.   7/23/2019 0200 by Aparna Garcia RN  Outcome: Progressing Towards Goal  7/23/2019 0055 by Aparna Garcia RN  Outcome: Progressing Towards Goal  Goal: *Prevention, detection, treatment of acute complications  Description  List symptoms of hyper- and hypoglycemia; describe how to treat low blood sugar and actions for lowering  high blood glucose level. 7/23/2019 0200 by Clari Cha RN  Outcome: Progressing Towards Goal  7/23/2019 0055 by Clari Cha RN  Outcome: Progressing Towards Goal  Goal: *Prevention, detection and treatment of chronic complications  Description  Define the natural course of diabetes and describe the relationship of blood glucose levels to long term complications of diabetes. 7/23/2019 0200 by Clari Cha RN  Outcome: Progressing Towards Goal  7/23/2019 0055 by Clari Cha RN  Outcome: Progressing Towards Goal  Goal: *Developing strategies to address psychosocial issues  Description  Describe feelings about living with diabetes; identify support needed and support network  7/23/2019 0200 by Clari Cha RN  Outcome: Progressing Towards Goal  7/23/2019 0055 by Clari Cha RN  Outcome: Progressing Towards Goal  Goal: *Insulin pump training  7/23/2019 0200 by Clari Cha RN  Outcome: Progressing Towards Goal  7/23/2019 0055 by Clari hCa RN  Outcome: Progressing Towards Goal  Goal: *Sick day guidelines  7/23/2019 0200 by Clari Cha RN  Outcome: Progressing Towards Goal  7/23/2019 0055 by Clari Cha RN  Outcome: Progressing Towards Goal  Goal: *Patient Specific Goal (EDIT GOAL, INSERT TEXT)  7/23/2019 0200 by Clari Cha RN  Outcome: Progressing Towards Goal  7/23/2019 0055 by Clari Cha RN  Outcome: Progressing Towards Goal     Problem: Patient Education: Go to Patient Education Activity  Goal: Patient/Family Education  7/23/2019 0200 by Clari Cha RN  Outcome: Progressing Towards Goal  7/23/2019 0055 by Clari Cha RN  Outcome: Progressing Towards Goal     Problem: Falls - Risk of  Goal: *Absence of Falls  Description  Document B[place] Mtz Fall Risk and appropriate interventions in the flowsheet.   Outcome: Progressing Towards Goal  Note:   Fall Risk Interventions:       Mentation Interventions: Bed/chair exit alarm, Family/sitter at bedside    Medication Interventions: Bed/chair exit alarm    Elimination Interventions: Call light in reach, Bed/chair exit alarm    History of Falls Interventions: Bed/chair exit alarm, Room close to nurse's station         Problem: Patient Education: Go to Patient Education Activity  Goal: Patient/Family Education  Outcome: Progressing Towards Goal     Problem: Pressure Injury - Risk of  Goal: *Prevention of pressure injury  Description  Document Jose Scale and appropriate interventions in the flowsheet.   Outcome: Progressing Towards Goal  Note:   Pressure Injury Interventions:                 Mobility Interventions: Assess need for specialty bed                          Problem: Patient Education: Go to Patient Education Activity  Goal: Patient/Family Education  Outcome: Progressing Towards Goal

## 2019-07-23 NOTE — H&P
Hospitalist Admission History and Physical     NAME:  Jael Greer   Age:  68 y.o.  :   1950   MRN:   [id number]  PCP: Andrew Arango MD  Consulting MD:  Treatment Team: Attending Provider: Nathen Sneed DO; Utilization Review: Mili Keys RN    No chief complaint on file. HPI:   Patient is a 68 y.o. male who presented to the ED for cc left arm pain. Patient found to have a closed displaced transverse shaft of the left humerus. S/p surgical repair of left humerus on 19. Patient lost at least 400cc while in OR and now BP low. Hx significant for anxiety, DM type II, gout, insomnia, monoclonal gammopathy of undetermined significance followed by [facility], atrial flutter on coumadin, HLD, HTN, dementia, and CHF with EF 50% in 2016. Received phenylephrine downstairs. Vitals - BP 75/41.      Labs- WBC 14.6, Hg 9.3 from baseline 8.8, INR 2.4, Mg 1.7   Past Medical History:   Diagnosis Date    Anxiety     Managed with meds     Arthritis     Atrial flutter ([address] 2/3/2016    CAD (coronary artery disease)     Bypass x 5, Dr. Serina Pablo follows     Cardiomyopathy [facility])     Chronic systolic heart failure ([address] 10/20/2010    CKD (chronic kidney disease) stage 3, GFR 30-59 ml/min ([address] 10/18/2010    Followed by Dr. Natalie Salguero Diabetic peripheral neuropathy ([facility] [address] 9/15/2015    Dyslipidemia 10/18/2010    Daily meds     Edema     Essential hypertension, benign 09/15/2015    Mnaged with meds     H/O falling     Insomnia 9/15/2015    sleep apnea - uses CPAP    Mixed hyperlipidemia 9/15/2015    Morbid obesity ([address] 10/18/2010    Nephrolithiasis     Oxygen dependent     O2 @ 4L NC     PAD (peripheral artery disease) (HCC)     ANGIOPLASTY- Right SFA angioplasty and stent for claudication Dr. Vik Michel, Vascular- Dr. Julia Mendoza Peripheral neuropathy     Pneumonia 2019    Renal insufficiency     Right heart failure (HCC)     S/P CABG (coronary artery bypass graft)     Sleep apnea     C-pap and 4 L O2 per NC    Type II or unspecified type diabetes mellitus without mention of complication, uncontrolled ([facility] 09/15/2015    Insulin, Average recently 130-300, unknown last A1C         Past Surgical History:   Procedure Laterality Date    CARDIAC SURG PROCEDURE UNLIST      By-pass x 5    HX ANGIOPLASTY      right SFA angioplasty and stent for claudication Dr. Luisa Brasher Bilateral     HX HEART CATHETERIZATION      HX UROLOGICAL      Kidney stone removal        Family History   Problem Relation Age of Onset    No Known Problems Mother     Coronary Artery Disease Father     No Known Problems Sister        Social History     Social History Narrative    Not on file        Social History     Tobacco Use    Smoking status: Former Smoker     Years: 20.00     Last attempt to quit: 1986     Years since quittin.5    Smokeless tobacco: Never Used   Substance Use Topics    Alcohol use: No     Comment: quit        Social History     Substance and Sexual Activity   Drug Use No         No Known Allergies    Prior to Admission medications    Medication Sig Start Date End Date Taking? Authorizing Provider   albuterol-ipratropium (DUO-NEB) 2.5 mg-0.5 mg/3 ml nebu 3 mL by Nebulization route four (4) times daily. Yes Provider, Historical   LORazepam (ATIVAN) 1 mg tablet Take 1 mg by mouth every eight (8) hours as needed for Anxiety. Yes Provider, Historical   traMADol (ULTRAM) 50 mg tablet Take 50 mg by mouth every four (4) hours as needed for Pain. Yes Provider, Historical   Oxygen Indications: 3 L via NC   Yes Provider, Historical   HUMALOG U-100 INSULIN 100 unit/mL injection by SubCUTAneous route. Indications: sliding scale   Yes Provider, Historical   simethicone (MYLICON) 80 mg chewable tablet Take 1 Tab by mouth four (4) times daily as needed for Flatulence.  19  Yes HIRAM Cortes   allopurinol (ZYLOPRIM) 100 mg tablet Take 100 mg by mouth daily. Yes Provider, Historical   pregabalin (LYRICA) 300 mg capsule 1 bid  Patient taking differently: Take 300 mg by mouth two (2) times a day. Indications: Diabetic Complication causing Injury to some Body Nerves 2/18/19  Yes Malcolm Sainz MD   zolpidem (AMBIEN) 10 mg tablet 1/2 to 1 qhs  Patient taking differently: Take 5 mg by mouth nightly. 1/2 to 1 qhs  Indications: Difficulty Falling Asleep 2/18/19  Yes Malcolm Sainz MD   atorvastatin (LIPITOR) 40 mg tablet 1 every day for cholesterol (replaces simvastatin)  Patient taking differently: Take 40 mg by mouth nightly. Indications: combined high blood cholesterol and triglyceride level 2/1/19  Yes Malcolm Sainz MD   bumetanide (BUMEX) 1 mg tablet 1 to 2 every day for fluid  Patient taking differently: Take 2 mg by mouth daily. 1 to 2 every day for fluid 2/23/18  Yes Malcolm Sainz MD   potassium chloride (KLOR-CON) 10 mEq tablet 1 every day for potassium  Patient taking differently: Take 10 mEq by mouth daily. 2/23/18  Yes Malcolm Sainz MD   DULoxetine (CYMBALTA) 60 mg capsule Take 1 Cap by mouth daily. Indications: ANXIETY WITH DEPRESSION, NEUROPATHIC PAIN 2/23/18  Yes Malcolm Sainz MD   losartan (COZAAR) 100 mg tablet 1 every day for BP  Indications: hypertension  Patient taking differently: Take 100 mg by mouth daily. Indications: high blood pressure 1/22/18  Yes Malcolm Sainz MD   fexofenadine-pseudoephedrine (ALLEGRA-D 24) 180-240 mg per tablet Take 1 Tab by mouth daily as needed. Indications: ALLERGIC RHINITIS 11/22/16  Yes Elvira Linares MD   aspirin 81 mg chewable tablet Take 1 Tab by mouth daily. 11/22/16  Yes Elvira Linares MD   polyethylene glycol (MIRALAX) 17 gram packet Take 1 Packet by mouth daily. 11/22/16  Yes Elvira Linares MD   nitroglycerin (NITROSTAT) 0.4 mg SL tablet 1 Tab by SubLINGual route as needed for Chest Pain.  4/1/16  Yes Marti Nguyen MD   fluticasone [facility]) 50 mcg/actuation nasal spray 1 spray IEN every day to BID  Patient taking differently: 1 Spray by Both Nostrils route two (2) times a day. 10/14/15  Yes Carol Fletcher MD   ammonium lactate (LAC-HYDRIN) 12 % lotion Apply  to affected area nightly. rub in to affected area well BLE    Provider, Historical   warfarin (COUMADIN) 2.5 mg tablet Take 2.5 mg by mouth every Tuesday, Thursday, Saturday & Sunday. Provider, Historical   warfarin (COUMADIN) 5 mg tablet Take 5 mg by mouth every Monday, Wednesday, Friday. Provider, Historical   azelastine (ASTELIN) 137 mcg (0.1 %) nasal spray Use in each nostril bid  Patient taking differently: 2 Sprays by Both Nostrils route two (2) times a day. 6/1/18   Carol Fletcher MD   azelastine (OPTIVAR) 0.05 % ophthalmic solution Use in affected eye bid to tid for allergies  Patient taking differently: Administer 1 Drop to both eyes three (3) times daily. 6/1/18   Carol Fletcher MD   Insulin Syringe-Needle U-100 (BD INSULIN SYRINGE) 1 mL 28 gauge x 1/2\" syrg Use 5 x a day. Dx E11.65  Indications: 5x a day 6/1/18   Carol Fletcher MD   glucose blood VI test strips (ONETOUCH ULTRA TEST) strip Use bid to tid   DX: E 11.65   Disp with lancets of formulary  Indications: dispense with lancets 6/1/18   Carol Fletcher MD   lancets [facility]) 30 gauge misc Use up to tid   Dx E11.65 2/23/18   Carol Fletcher MD   Blood-Glucose Meter [facility] Eddie Guerin) monitoring kit Use bid to tid   DX E11.65  If another product preferred on formulary please let me know which to select from. jlb 4/20/17   Carol Fletcher MD   hydrocortisone (ANUSOL-HC) 2.5 % rectal cream Insert  into rectum four (4) times daily. Patient taking differently: Insert  into rectum four (4) times daily as needed.  11/14/16   Annie Fink PA-C           Review of Systems    Cannot obtain due to dementia       Objective:     Visit Vitals  BP (!) 75/41 (BP 1 Location: Right arm, BP Patient Position: At rest)   Pulse (!) 59   Temp 98.2 °F (36.8 °C)   Resp 16   Ht 5' 11\" (1.803 m)   Wt (!) 163.3 kg (360 lb)   SpO2 97%   BMI 50.21 kg/m²        No intake/output data recorded.   07/21 0701 - 07/22 1900  In: 1310 [I.V.:1000]  Out: 1600 [Urine:1300]    Data Review:   Recent Results (from the past 24 hour(s))   HEMOGLOBIN A1C WITH EAG    Collection Time: 07/22/19 12:38 PM   Result Value Ref Range    Hemoglobin A1c 8.5 %    Est. average glucose 197 mg/dL   GLUCOSE, POC    Collection Time: 07/22/19 12:40 PM   Result Value Ref Range    Glucose (POC) 266 (H) 65 - 100 mg/dL   POC PT/INR    Collection Time: 07/22/19 12:43 PM   Result Value Ref Range    Prothrombin time (POC) 25.7 (H) 9.6 - 11.6 SECS    INR (POC) 2.2 (H) 0.9 - 1.2     TYPE + CROSSMATCH    Collection Time: 07/22/19  1:09 PM   Result Value Ref Range    Crossmatch Expiration 07/25/2019     ABO/Rh(D) A POSITIVE     Antibody screen NEG     Unit number [id number]     Blood component type [facility]     Unit division 00     Status of unit ISSUED     Crossmatch result Compatible     Unit number [id number]     Blood component type [facility]     Unit division 00     Status of unit ALLOCATED     Crossmatch result Compatible    GLUCOSE, POC    Collection Time: 07/22/19  1:49 PM   Result Value Ref Range    Glucose (POC) 281 (H) 65 - 100 mg/dL   GLUCOSE, POC    Collection Time: 07/22/19  2:19 PM   Result Value Ref Range    Glucose (POC) 264 (H) 65 - 100 mg/dL   CBC W/O DIFF    Collection Time: 07/22/19  6:42 PM   Result Value Ref Range    WBC 14.6 (H) 4.3 - 11.1 K/uL    RBC 4.31 4.23 - 5.6 M/uL    HGB 9.3 (L) 13.6 - 17.2 g/dL    HCT 31.8 (L) 41.1 - 50.3 %    MCV 73.8 (L) 79.6 - 97.8 FL    MCH 21.6 (L) 26.1 - 32.9 PG    MCHC 29.2 (L) 31.4 - 35.0 g/dL    RDW 22.8 (H) 11.9 - 14.6 %    PLATELET 403 984 - 795 K/uL    MPV 11.2 9.4 - 12.3 FL    ABSOLUTE NRBC 0.00 0.0 - 0.2 K/uL   METABOLIC PANEL, BASIC    Collection Time: 07/22/19  6:42 PM   Result Value Ref Range Sodium 139 136 - 145 mmol/L    Potassium 4.4 3.5 - 5.1 mmol/L    Chloride 100 98 - 107 mmol/L    CO2 31 21 - 32 mmol/L    Anion gap 8 7 - 16 mmol/L    Glucose 235 (H) 65 - 100 mg/dL    BUN 57 (H) 8 - 23 MG/DL    Creatinine 1.65 (H) 0.8 - 1.5 MG/DL    GFR est AA 53 (L) >60 ml/min/1.73m2    GFR est non-AA 44 (L) >60 ml/min/1.73m2    Calcium 8.1 (L) 8.3 - 10.4 MG/DL   MAGNESIUM    Collection Time: 07/22/19  6:42 PM   Result Value Ref Range    Magnesium 1.7 (L) 1.8 - 2.4 mg/dL   PROTHROMBIN TIME + INR    Collection Time: 07/22/19  6:42 PM   Result Value Ref Range    Prothrombin time 26.3 (H) 11.7 - 14.5 sec    INR 2.4         Physical Exam:     General:  Alert, cooperative, pale, pleasantly demented    Eyes:  Conjunctivae/corneas clear. Ears:  Normal TMs and external ear canals both ears. Nose: Nares normal. Septum midline. Mouth/Throat: Dry oral mucosa    Neck:  no JVD. Back:   deferred   Lungs:   Clear to auscultation bilaterally. Heart:  irregularly irregular    Abdomen:   Soft, non-tender. Bowel sounds normal. No masses,  No organomegaly. Morbidly obese    Extremities: Chronic venous stasis to LE bilaterally. DP can be palpated bilaterally    Pulses: 2+ and symmetric all extremities.    Skin: As above    Lymph nodes: Cervical, supraclavicular, and axillary nodes normal.   Neurologic: Pleasantly demented      Assessment and Plan     Principal Problem:    Hypovolemic shock ([facility] (7/22/2019)    Active Problems:    CKD (chronic kidney disease) stage 3, GFR 30-59 ml/min ([address] (10/18/2010)      Morbid obesity ([facility] [address] (10/18/2010)      Essential hypertension, benign (9/15/2015)      Diabetes mellitus type 2, controlled ([address] (12/15/2015)      JASON (obstructive sleep apnea) (4/7/2016)      Closed displaced transverse fracture of shaft of left humerus (7/17/2019)      Closed displaced transverse fracture of shaft of right humerus (7/22/2019)      Hypotension (7/22/2019)      Sacral wound (7/22/2019)    Hypovolemic shock - To get second PRBC unit while in ICU. Order phenylephrine if needed. Monitor for signs of volume overload due to hx of CHF. Discussed plan with nurse who will monitor. Hold home medications that can reduce BP such as his Ultram, Lyrica, ambien. Trend H&H    DM type II - SS.     CHF EF 50%- Hold ASA daily today in setting of acute blood loss. Hold losartan in setting of hypotension. Takes Bumex 1-2 tablets daily but will hold Bumex in setting of hypotension. Monitor for signs of volume overload. On cardiac monitor. Order strict Is and Os. Daily weights. Atrial fib - Holding coumadin in setting of hypovolemic shock. Daily INR. Sacral wound - Could not see due to patient being adjusted in ICU setting but will have wound care follow. Nursing staff to take pictures. PRN ativan at reduced dose. Spoken to wife at bedside who states mentally he is at his baseline. DVT prophylaxis - SCDs.  Holding coumadin  Signed By: Marleny Ruvalcaba, DO   July 22, 2019

## 2019-07-23 NOTE — PROGRESS NOTES
Set up patient on CPAP with nasal mask which was removed by patient within 5 minutes. Started Duoneb aerosol treatment which was also promptly removed by patient. KEE Fowler notified of patient's refusal of CPAP and breathing treatment.

## 2019-07-23 NOTE — PROGRESS NOTES
Nutrition  Reason for assessment:   Best Practice Alert: Pressure Ulcer    Assessment:   Food/Nutrition Patient History:  Pt admitted with hypovolemic shock s/p L humerus shaft ORIF. PMH remarkable for anxiety, DM, gout, insomnia, monoclonal gammopathy, aflutter, HLD, HTN, CHD, CKD. Pt is sleeping at RD visit, wife Juliane Hill at bedside provided history. Pt with recent increasing confusion, fall and has been in rehab on a speciality bed. She reports his intake has fluctuated during this time as well. She has emphasized eating protein foods and he has been consuming glucerna. Skin Status:  sacral promontory skin breakdown site is round quarter size and broken. DIET DIABETIC CONSISTENT CARB Regular      Anthropometrics:Height: 6' (182.9 cm),  Weight: 152 kg (335 lb 3.2 oz), Weight Source: Patient stated, Body mass index is 45.46 kg/m². BMI class of morbid obesity. Macronutrient needs: 152 kg listed weight   EER:  2055-3705 kcal /day (11-14 kcal/kg)  EPR:   grams protein/day (0.6-0.8 grams/kg)    Intake/Comparative Standards: Recorded meal(s): none. Wife and RN report ~50% am meal. Insufficient data to evaluate at this time. Wife reports decline in intake PTA. Nutrition Diagnosis: Predicted inadequate oral intake related to compromised appetite, confusion as evidenced by wife's report including declining intake PTA. Intervention:  Meals and snacks: Continue current diet. Nutrition Supplement Therapy: Glucerna TID - vanilla or chocolate. Coordination of Nutrition Care: Maite Kasper RN. Discharge Nutrition Plan: Too soon to determine.      [place], [place], [address]

## 2019-07-23 NOTE — PROGRESS NOTES
Hospitalist Progress Note    2019  Admit Date: 2019 11:40 AM   NAME: Dianna Joseph   :  1950   MRN:  [id number]   Attending: Alda Young DO  PCP:  Kayode Walden MD    SUBJECTIVE:   Patient is a 77yo M with hx Afib/flutter on coumadin, CAD, CHF, CKD, DM, and JASON who was recently hospitalized for falls and acute encephalopathy. He was found at that time to have L humerus fracture. Treated initially nonoperatively. Encephalopathy found to be due to polypharmacy and improved with decreased sedating medications. Pt has been in SNF and continued to have problems with arm, was schedule for surgical fixation . Pt became hypotensive during/after surgery and received phenylephrine. Transferred to ICU for blood transfusion and possible pressors, but were not needed after fluids and blood. : blood pressures improved overnight, no need for pressors. Hgb stable. To move out of unit. Worked with PT, significantly debilitated. Wife most concerned about pt's confusion and weakness. Reports that is has been present for weeks and worsening.     Review of Systems negative with exception of pertinent positives noted above  PHYSICAL EXAM     Visit Vitals  /58   Pulse 61   Temp 97.6 °F (36.4 °C)   Resp 29   Ht 6' (1.829 m)   Wt 152 kg (335 lb 3.2 oz)   SpO2 93%   BMI 45.46 kg/m²      Temp (24hrs), Av.1 °F (36.7 °C), Min:97.6 °F (36.4 °C), Max:98.7 °F (37.1 °C)    Oxygen Therapy  O2 Sat (%): 93 % (19)  Pulse via Oximetry: 66 beats per minute (19)  O2 Device: Nasal cannula (19)  O2 Flow Rate (L/min): 4 l/min (19)    Intake/Output Summary (Last 24 hours) at 2019 1248  Last data filed at 2019 0620  Gross per 24 hour   Intake 2621 ml   Output 2075 ml   Net 546 ml      General: Morbidly obese, NAD  Lungs:  diminished   Heart:  IRIR  Abdomen: Soft, Non distended, Non tender, Positive bowel sounds  Extremities: L arm in sling  Neurologic:  Oriented to person, place, some confusion    XR HUMERUS LT   Preliminary Result   IMPRESSION:    Postsurgical change as described. XR HUMERUS LT    (Results Pending)         ASSESSMENT      Active Hospital Problems    Diagnosis Date Noted    Closed displaced transverse fracture of shaft of right humerus 07/22/2019    Hypotension 07/22/2019    Hypovolemic shock ([address] 07/22/2019    Sacral wound 07/22/2019    Closed displaced transverse fracture of shaft of left humerus 07/17/2019    JASON (obstructive sleep apnea) 04/07/2016    Diabetes mellitus type 2, controlled ([address] 12/15/2015    Essential hypertension, benign 09/15/2015    CKD (chronic kidney disease) stage 3, GFR 30-59 ml/min ([address] 10/18/2010    Morbid obesity ([address] 10/18/2010     Plan:    Hypovolemic shock: improved with fluids, 2u prbc. No need for pressors after OR. No signs overload currently. Anemia: chronic iron deficent anemia - not measured to be below baseline but unchanged after 2U PRBC. Possible acute blood loss superimposed but now stable. Trend. Replace iron. Encephalopathy: somewhat of a chronic issue, prior attributed to polypharmacy last admission, now postop with recent anaesthesia and pain meds. May have some underlying neurodegenerative condition to be worked up but should focus now on reducing altering medications. CHF: holding losartan and bumex in hypotension, restart if improves/signs overload. Afib: okay to restart coumadin    Sacral wound: wound care    DM: A1c 8.5. SSI    Pyuria: culture pending. Treat in setting of confusion, leukocytosis: rocephin.  Received intraop vanc    DVT Prophylaxis: coumadin  Dispo: SNF pendng    Signed By: Marylin Elise MD     July 23, 2019

## 2019-07-23 NOTE — PROGRESS NOTES
Problem: Mobility Impaired (Adult and Pediatric)  Goal: *Acute Goals and Plan of Care (Insert Text)  Description  (1.)Mr. Ryder Hsieh will move from supine to sit and sit to supine , scoot up and down and roll side to side with MAXIMAL ASSIST within 7 treatment day(s). (2.)Mr. Ryder Hsieh will transfer from sit to stand and stand to sit with maximal assist of 2 people within 7 treatment days. (3.)Mr. Ryder Hsieh will perform seated static and dynamic balance activities with STAND BY ASSIST to improve safety within 7 day(s). (4.)Mr. Ryder Hsieh will maintain NWB LUE throughout all functional mobility within 7 days. (5.)Mr. Ryder Hsieh will participate in shoulder HEP with minimal assist per MD orders. ________________________________________________________________________________________________     Outcome: Progressing Towards Goal     PHYSICAL THERAPY: Initial Assessment, Treatment Day: Day of Assessment and AM 7/23/2019  INPATIENT: Hospital Day: 2  Payor: Cecilia Vance / Plan: [employer] / Product Type: HMO /      NAME/AGE/GENDER: Jordan Yeung is a 68 y.o. male   PRIMARY DIAGNOSIS: Displaced transverse fracture of shaft of humerus, right arm, initial encounter for closed fracture [S42.321A]  Closed displaced transverse fracture of shaft of right humerus, initial encounter [S42.321A]  Closed displaced transverse fracture of shaft of left humerus [S42.322A]  Hypotension [I95.9] Hypovolemic shock (HCC)   Hypovolemic shock (HCC)    Procedure(s) (LRB):  LEFT HUMERUS SHAFT  OPEN REDUCTION INTERNAL FIXATION  (Left)  1 Day Post-Op  ICD-10: Treatment Diagnosis:   · Pain in Left Shoulder (M25.512)  · Stiffness of Left Shoulder, Not elsewhere classified (M25.612)  · Generalized Muscle Weakness (M62.81)  · Difficulty in walking, Not elsewhere classified (R26.2)  · History of falling (Z91.81)   Precaution/Allergies:  Patient has no known allergies.    PER MD ORDERS  Active and passive range of motion left elbow hand  Gentle pendulums  No other shoulder motion  nwb ue left  wbat ble  Sling left shoulder     ASSESSMENT:     Mr. Lesa Wan presents supine on contact in ICU, s/p above procedure. Pt had a fall on 6/25th when he sustained this fracture that has been repaired. It was not his first fall, has had several recently. Also with increasing confusion prior to these admissions. He was admitted to hospital on that fall in June. Left hospital and went to a rehab facility. Per family he came from rehab to the hospital. Wife states at rehab had not been walking or sitting on the side of the bed very much at all. Pt with a mitt on his right hand to keep him from pulling out his IV-some confusion. Assessed supine to sit-was max to total assist of 3 people. Not able to sit upright without support and poor tolerance for activity-asked to lay down fairly quickly. Worked on the exercises prescribed by ortho MD. This pt presents with generalized weakness and decreased mobility. Will need skilled PT interventions to maximize independence with mobility and work on shoulder HEP. Discussed with SW, pt will need extensive rehab. This section established at most recent assessment   PROBLEM LIST (Impairments causing functional limitations):  1. Decreased [place] with Bed Mobility  2. Decreased [place] with Transfers  3. Decreased [place] with Ambulation   4. Decreased [place] with shoulder HEP   INTERVENTIONS PLANNED: (Benefits and precautions of physical therapy have been discussed with the patient.)  1. Bed Mobility Training  2. Transfer Training  3. Gait Training  4. Therapeutic Exercises per MD orders  5. Modalities for Pain     TREATMENT PLAN: Frequency/Duration: twice daily for duration of hospital stay  Rehabilitation Potential For Stated Goals: Good     RECOMMENDED REHABILITATION/EQUIPMENT: (at time of discharge pending progress): Continue Skilled Therapy, Rehab and Discussed with Case Management. HISTORY:   History of Present Injury/Illness (Reason for Referral):  Pt s/p above procedure  Past Medical History/Comorbidities:   Mr. Sharyn Dawkins  has a past medical history of Anxiety, Arthritis, Atrial flutter ([facility] [address] (2/3/2016), CAD (coronary artery disease), Cardiomyopathy ([address] Chronic systolic heart failure ([address] (10/20/2010), CKD (chronic kidney disease) stage 3, GFR 30-59 ml/min ([address] (10/18/2010), Diabetic peripheral neuropathy ([address] (9/15/2015), Dyslipidemia (10/18/2010), Edema, Essential hypertension, benign (09/15/2015), H/O falling, Insomnia (9/15/2015), Mixed hyperlipidemia (9/15/2015), Morbid obesity ([address] (10/18/2010), Nephrolithiasis, Oxygen dependent, PAD (peripheral artery disease) ([address] Peripheral neuropathy, Pneumonia (07/04/2019), Renal insufficiency, Right heart failure ([address] S/P CABG (coronary artery bypass graft), Sleep apnea, and Type II or unspecified type diabetes mellitus without mention of complication, uncontrolled ([address] (09/15/2015). Mr. Sharyn Dawkins  has a past surgical history that includes hx urological; hx cataract removal (Bilateral); pr cardiac surg procedure unlist; hx angioplasty; and hx heart catheterization.   Social History/Living Environment:   Home Environment: Private residence(but was admitted from SNF)  # Steps to Enter: 2  One/Two Story Residence: One story  Living Alone: No  Support Systems: Spouse/Significant Other/Partner  Patient Expects to be Discharged to[de-identified] Skilled nursing facility  Current DME Used/Available at Home: Nuria Cater, straight, Oxygen, portable, Shower chair, Walker, rollator, Walker, rolling, Wheelchair  Tub or Shower Type: Tub/Shower combination  Prior Level of Function/Work/Activity:  Pt was at a rehab facility and prior to that hospital. Before initial hospital admission lived with wife, walked about 50 feet with walker, needed assist for ADLs and was on home oxygen   Number of Personal Factors/Comorbidities that affect the Plan of Care: 3+: HIGH COMPLEXITY   EXAMINATION:   Most Recent Physical Functioning:   Gross Assessment:  AROM: Generally decreased, functional(except left upper extremity not assessed)  Strength: Generally decreased, functional(except left upper extremity)               Posture:     Balance:  Sitting: Impaired  Sitting - Static: Poor (constant support)  Sitting - Dynamic: Poor (constant support) Bed Mobility:  Supine to Sit: Maximum assistance; Total assistance; Additional time;Assist x2; Other (comment)(plus one more person)  Sit to Supine: Maximum assistance; Total assistance;Assist x2; Other (comment)(plus one more person)  Scooting: Total assistance  Wheelchair Mobility:     Transfers:     Gait:            Body Structures Involved:  1. Joints  2. Muscles Body Functions Affected:  1. Movement Related Activities and Participation Affected:  1. Mobility  2. Self Care   Number of elements that affect the Plan of Care: 4+: HIGH COMPLEXITY   CLINICAL PRESENTATION:   Presentation: Stable and uncomplicated: LOW COMPLEXITY   CLINICAL DECISION MAKIN[address] [place] [id number] AM-PAC 6 Clicks   Basic Mobility Inpatient Short Form  How much difficulty does the patient currently have. .. Unable A Lot A Little None   1. Turning over in bed (including adjusting bedclothes, sheets and blankets)? ? 1   ? 2   ? 3   ? 4   2. Sitting down on and standing up from a chair with arms ( e.g., wheelchair, bedside commode, etc.)   ? 1   ? 2   ? 3   ? 4   3. Moving from lying on back to sitting on the side of the bed?   ? 1   ? 2   ? 3   ? 4   How much help from another person does the patient currently need. .. Total A Lot A Little None   4. Moving to and from a bed to a chair (including a wheelchair)? ? 1   ? 2   ? 3   ? 4   5. Need to walk in hospital room? ? 1   ? 2   ? 3   ? 4   6. Climbing 3-5 steps with a railing? ? 1   ? 2   ? 3   ? 4   © 2007, Trustees of [address] [place] [id number], under license to [employer].  All rights reserved      Score:  Initial: 8 Most Recent: X (Date: -- )    Interpretation of Tool:  Represents activities that are increasingly more difficult (i.e. Bed mobility, Transfers, Gait). Medical Necessity:     · Patient is expected to demonstrate progress in strength, balance, coordination and functional technique  ·  to decrease assistance required with functional mobility   · . Reason for Services/Other Comments:  · Patient continues to require skilled intervention due to inability to complete functional mobility indpendently   · . Use of outcome tool(s) and clinical judgement create a POC that gives a: Clear prediction of patient's progress: LOW COMPLEXITY            TREATMENT:   (In addition to Assessment/Re-Assessment sessions the following treatments were rendered)   Pre-treatment Symptoms/Complaints:  pain  Pain: Initial:     Having pain, said he had meds Post Session:  still hurting     Therapeutic Exercise: (8 Minutes):  Exercises per grid below to improve mobility and strength. Required moderate verbal and manual cues to promote proper body alignment and promote proper body posture. Progressed repetitions as indicated. Date:  7/23/19 Date:   Date:     ACTIVITY/EXERCISE AM PM AM PM AM PM   Gripping 10 AA        Wrist Flexion/Extension 10 AA        Wrist Ulnar/Radial Deviation         Pronation/Supination 10 AA        Elbow Flexion/Extension 10 AA        Shoulder Flexion/Extension         Shoulder AB/ADduction         Shoulder IR/ER         Pulleys         Pendulums Unable until pt can stand        Shrugs         Isometric:                 Flexion         Extension         ABduction         ADduction         Biceps/Triceps                  B = bilateral; AA = active assistive; A = active; P = passive  Education:  ? Home Exercises  ? Sling Application   ? Movement Precautions   ? Pulleys   ? Use of Ice   ?   Other:   Treatment/Session Assessment:    · Response to Treatment: pt tolerated fair, very weak  · Interdisciplinary Collaboration:   o Registered Nurse  o Rehabilitation Attendant  · After treatment position/precautions:   o Supine in bed  o Bed/Chair-wheels locked  o Bed in low position  o Call light within reach  o RN notified  o Family at bedside   · Compliance with Program/Exercises: Will assess as treatment progresses. · Recommendations/Intent for next treatment session:  Treatment next visit will focus on increasing Mr. Mayra Goncalves independence with bed mobility, transfers, gait training, strength/ROM exercises, modalities for pain, and patient education.    Total Treatment Duration:  PT Patient Time In/Time Out  Time In: 0930  Time Out: Rhonda Corea [id number], PT

## 2019-07-23 NOTE — INTERDISCIPLINARY ROUNDS
Interdisciplinary team rounds were held 7/23/2019 with the following team members:Care Management, Nursing and Physician and the patient. Plan of care discussed. See clinical pathway and/or care plan for interventions and desired outcomes.

## 2019-07-24 NOTE — PROGRESS NOTES
Cardenas Catheter removed at 1100. Pt has still not voided. Bladder scan showed 200 ml urine. Will rescan at 1830.

## 2019-07-24 NOTE — PROGRESS NOTES
Pt in bed resting. Alert to person and place disoriented to time. Denies pain. +2 radial pulses. NV checks WDL. Dressing clean dry and intact. Bed in low and locked position with call light within reach.

## 2019-07-24 NOTE — PROGRESS NOTES
Patient out of ICU and on Ortho floor. Patient planning on returning to [facility] for rehab. We are waiting on [facility] [employer] [email or web address] [employer] [facility]. insurances approval for this return. Dr. Emperatriz Martinez consulted. We hope to have approval for a Thursday 7-25  transfer if medically ready and precert obtained.   Lele Keen

## 2019-07-24 NOTE — PROGRESS NOTES
Pt resting in bed, call light within reach, side rails up x 3, and bed low and locked. Shift assessment complete. Pt c/o left arm pain. Wife at bedside. Pt appears to be more alert and oriented than expressed in report from yesterday. No needs at this time.

## 2019-07-24 NOTE — PROGRESS NOTES
Care Management Interventions  Mode of Transport at Discharge: BLS  Transition of Care Consult (CM Consult): LTAC  Physical Therapy Consult: Yes  Occupational Therapy Consult: Yes  Current Support Network: Lives with Spouse  Confirm Follow Up Transport: Family  Plan discussed with Pt/Family/Caregiver: Yes  Freedom of Choice Offered: Yes  Discharge Location  Discharge Placement: [address] [place] [address])    I met with Pt/wife about d/c plans. Wife was worried about his care being too much for the NH to provide. She was very interested in [facility]. Dr. Contreras Sanderson approved this option so a referral was made to [facility] with [facility]. Ins precert will be required. I called Ana Cristina Lui with [place] and put their precert on hold. Will follow in hopes we receive approval for [facility] before the weekend.   Radha Pat

## 2019-07-24 NOTE — PROGRESS NOTES
Problem: Mobility Impaired (Adult and Pediatric)  Goal: *Acute Goals and Plan of Care (Insert Text)  Description  (1.)Mr. Brianne Rojas will move from supine to sit and sit to supine , scoot up and down and roll side to side with MAXIMAL ASSIST within 7 treatment day(s). (2.)Mr. Brianne Rojas will transfer from sit to stand and stand to sit with maximal assist of 2 people within 7 treatment days. (3.)Mr. Brianne Rojas will perform seated static and dynamic balance activities with STAND BY ASSIST to improve safety within 7 day(s). (4.)Mr. Brianne Rojas will maintain NWB LUE throughout all functional mobility within 7 days. (5.)Mr. Brianne Rojas will participate in shoulder HEP with minimal assist per MD orders. ________________________________________________________________________________________________     Outcome: Progressing Towards Goal     PHYSICAL THERAPY: Daily Note and AM 7/24/2019  INPATIENT: Hospital Day: 3  Payor: Janice Wyatt / Plan: [employer] [address] HMO / Product Type: HMO /      NAME/AGE/GENDER: Nancie Singh is a 68 y.o. male   PRIMARY DIAGNOSIS: Displaced transverse fracture of shaft of humerus, right arm, initial encounter for closed fracture [S42.321A]  Closed displaced transverse fracture of shaft of right humerus, initial encounter [S42.321A]  Closed displaced transverse fracture of shaft of left humerus [S42.322A]  Hypotension [I95.9] Hypovolemic shock (HCC)   Hypovolemic shock (HCC)    Procedure(s) (LRB):  LEFT HUMERUS SHAFT  OPEN REDUCTION INTERNAL FIXATION  (Left)  2 Days Post-Op  ICD-10: Treatment Diagnosis:   · Pain in Left Shoulder (M25.512)  · Stiffness of Left Shoulder, Not elsewhere classified (M25.612)  · Generalized Muscle Weakness (M62.81)  · Difficulty in walking, Not elsewhere classified (R26.2)  · History of falling (Z91.81)   Precaution/Allergies:  Patient has no known allergies.    PER MD ORDERS  Active and passive range of motion left elbow hand  Gentle pendulums  No other shoulder motion  nwb ue left  wbat ble  Sling left shoulder     ASSESSMENT:     Mr. Kentrell Maldonado presents supine on contact s/p above procedure. Pt had a fall on 6/25th when he sustained this fracture that has been repaired. It was not his first fall, has had several recently. Also with increasing confusion prior to these admissions. He was admitted to hospital on that fall in June. Left hospital and went to a rehab facility. Per family he came from rehab to the hospital. Wife states at rehab had not been walking or sitting on the side of the bed very much at all. This pt presents with generalized weakness and decreased mobility. Will need skilled PT interventions to maximize independence with mobility and work on shoulder HEP. Discussed with SW, pt will need extensive rehab.   7/24 - pt. Supine on contact. Pt.'s eyes open, making very few verbalizations. He did answer his wife most of the time but rarely answered my questions. He was total assistance/max assist x 2-3 people to sit up. He did use his right UE a little to go supine sit. He has difficulty moving any part of his body. Once sitting, he did sit most of the time CGA. CNA and RN present to bath and change gown. He performed elbow and wrist exercises listed below, mostly prom. He could wiggle his fingers a little, swelling present in hand. We attempted to stand x 2 with several folks but patient was unable to stand. He sat up probably 20 minutes and returned to supine. He was rolled several times for clean up and brief change. He was left supine with family. He needs significant assistance with all functional mobility and will need extensive rehab. This section established at most recent assessment   PROBLEM LIST (Impairments causing functional limitations):  1. Decreased [place] with Bed Mobility  2. Decreased [place] with Transfers  3. Decreased [place] with Ambulation   4.  Decreased [place] with shoulder HEP   INTERVENTIONS PLANNED: (Benefits and precautions of physical therapy have been discussed with the patient.)  1. Bed Mobility Training  2. Transfer Training  3. Gait Training  4. Therapeutic Exercises per MD orders  5. Modalities for Pain     TREATMENT PLAN: Frequency/Duration: twice daily for duration of hospital stay  Rehabilitation Potential For Stated Goals: Good     RECOMMENDED REHABILITATION/EQUIPMENT: (at time of discharge pending progress): Continue Skilled Therapy, Rehab and Discussed with Case Management. HISTORY:   History of Present Injury/Illness (Reason for Referral):  Pt s/p above procedure  Past Medical History/Comorbidities:   Mr. Bayron Colin  has a past medical history of Anxiety, Arthritis, Atrial flutter ([address] (2/3/2016), CAD (coronary artery disease), Cardiomyopathy ([address] Chronic systolic heart failure ([address] (10/20/2010), CKD (chronic kidney disease) stage 3, GFR 30-59 ml/min ([address] (10/18/2010), Diabetic peripheral neuropathy ([address] (9/15/2015), Dyslipidemia (10/18/2010), Edema, Essential hypertension, benign (09/15/2015), H/O falling, Insomnia (9/15/2015), Mixed hyperlipidemia (9/15/2015), Morbid obesity ([address] (10/18/2010), Nephrolithiasis, Oxygen dependent, PAD (peripheral artery disease) ([address] Peripheral neuropathy, Pneumonia (07/04/2019), Renal insufficiency, Right heart failure ([address] S/P CABG (coronary artery bypass graft), Sleep apnea, and Type II or unspecified type diabetes mellitus without mention of complication, uncontrolled ([address] (09/15/2015). Mr. Bayron Colin  has a past surgical history that includes hx urological; hx cataract removal (Bilateral); pr cardiac surg procedure unlist; hx angioplasty; and hx heart catheterization.   Social History/Living Environment:   Home Environment: Private residence(but was admitted from SNF)  # Steps to Enter: 2  One/Two Story Residence: One story  Living Alone: No  Support Systems: Spouse/Significant Other/Partner  Patient Expects to be Discharged to[de-identified] Skilled nursing facility  Current DME Used/Available at Home: [employer], straight, Oxygen, portable, Shower chair, Walker, rollator, Walker, rolling, Wheelchair  Tub or Shower Type: Tub/Shower combination  Prior Level of Function/Work/Activity:  Pt was at a rehab facility and prior to that hospital. Before initial hospital admission lived with wife, walked about 50 feet with walker, needed assist for ADLs and was on home oxygen   Number of Personal Factors/Comorbidities that affect the Plan of Care: 3+: HIGH COMPLEXITY   EXAMINATION:   Most Recent Physical Functioning:   Gross Assessment:                  Posture:     Balance:  Sitting: Impaired;High guard  Sitting - Static: Fair (occasional)  Sitting - Dynamic: Poor (constant support)  Standing: (cant stand) Bed Mobility:  Rolling: Total assistance  Supine to Sit: Assist x2; Additional time; Total assistance;Maximum assistance  Sit to Supine: Assist x2; Additional time; Total assistance;Maximum assistance  Scooting: Total assistance  Duration: 30 Minutes  Wheelchair Mobility:     Transfers:  Sit to Stand: (unable, attempted x 2)  Gait:            Body Structures Involved:  1. Joints  2. Muscles Body Functions Affected:  1. Movement Related Activities and Participation Affected:  1. Mobility  2. Self Care   Number of elements that affect the Plan of Care: 4+: HIGH COMPLEXITY   CLINICAL PRESENTATION:   Presentation: Stable and uncomplicated: LOW COMPLEXITY   CLINICAL DECISION MAKIN \[facility] [place] [facility]\"" [address] [address] AM-PAC 6 Clicks   Basic Mobility Inpatient Short Form  How much difficulty does the patient currently have. .. Unable A Lot A Little None   1. Turning over in bed (including adjusting bedclothes, sheets and blankets)? ? 1   ? 2   ? 3   ? 4   2. Sitting down on and standing up from a chair with arms ( e.g., wheelchair, bedside commode, etc.)   ? 1   ? 2   ? 3   ? 4   3.   Moving from lying on back to sitting on the side of the bed?   ? 1   ? 2   ? 3   ? 4   How much help from another person does the patient currently need. .. Total A Lot A Little None   4. Moving to and from a bed to a chair (including a wheelchair)? ? 1   ? 2   ? 3   ? 4   5. Need to walk in hospital room? ? 1   ? 2   ? 3   ? 4   6. Climbing 3-5 steps with a railing? ? 1   ? 2   ? 3   ? 4   © 2007, Trustees of [facility] [place] [employer], under license to [employer] [place]. All rights reserved      Score:  Initial: 8 Most Recent: X (Date: -- )    Interpretation of Tool:  Represents activities that are increasingly more difficult (i.e. Bed mobility, Transfers, Gait). Medical Necessity:     · Patient is expected to demonstrate progress in strength, balance, coordination and functional technique  ·  to decrease assistance required with functional mobility   · . Reason for Services/Other Comments:  · Patient continues to require skilled intervention due to inability to complete functional mobility indpendently   · . Use of outcome tool(s) and clinical judgement create a POC that gives a: Clear prediction of patient's progress: LOW COMPLEXITY            TREATMENT:   (In addition to Assessment/Re-Assessment sessions the following treatments were rendered)   Pre-treatment Symptoms/Complaints:  Shoulder pain  Pain: Initial:  Post Session:  Unable to rate     Therapeutic Exercise: (10 Minutes):  Exercises per grid below to improve mobility and strength. Required moderate verbal and manual cues to promote proper body alignment and promote proper body posture. Progressed repetitions as indicated. Therapeutic Activity: (30 Minutes   ):  Therapeutic activities including Bed transfers and sitting and rolling and scooting and bed mobilty to improve mobility and strength. Required maximal   to promote static and dynamic balance in sitting.       Date:  7/23/19 Date:  7/24 Date:     ACTIVITY/EXERCISE AM PM AM PM AM PM   Gripping 10 AA 15 AA 15prom      Wrist Flexion/Extension 10 AA 15 AA 15prom      Wrist Ulnar/Radial Deviation Pronation/Supination 10 AA 15 AA 15prom      Elbow Flexion/Extension 10 AA 15 AA 15prom      Shoulder Flexion/Extension         Shoulder AB/ADduction         Shoulder IR/ER         Pulleys         Pendulums Unable until pt can stand        Shrugs         Isometric:                 Flexion         Extension         ABduction         ADduction         Biceps/Triceps                  B = bilateral; AA = active assistive; A = active; P = passive  Education:  ? Home Exercises  x  Sling Application   ? Movement Precautions   ? Pulleys   x  Use of Ice   ? Other:   Treatment/Session Assessment:    · Response to Treatment: pt. At low level, will need extensive rehab but seemingly did a little better today  · Interdisciplinary Collaboration:   o Registered Nurse  o Certified [address]  · After treatment position/precautions:   o Supine in bed  o Bed/Chair-wheels locked  o Bed in low position  o Call light within reach  o RN notified  o Family at bedside   · Compliance with Program/Exercises: Will assess as treatment progresses. · Recommendations/Intent for next treatment session:  Treatment next visit will focus on increasing Mr. Cherry Klinefelter independence with bed mobility, transfers, gait training, strength/ROM exercises, modalities for pain, and patient education.    Total Treatment Duration:  PT Patient Time In/Time Out  Time In: 1105  Time Out: [address] [facility], PT

## 2019-07-24 NOTE — PROGRESS NOTES
Problem: Mobility Impaired (Adult and Pediatric)  Goal: *Acute Goals and Plan of Care (Insert Text)  Description  (1.)Mr. Sonido Pederson will move from supine to sit and sit to supine , scoot up and down and roll side to side with MAXIMAL ASSIST within 7 treatment day(s). (2.)Mr. Sonido Pederson will transfer from sit to stand and stand to sit with maximal assist of 2 people within 7 treatment days. (3.)Mr. Sonido Pederson will perform seated static and dynamic balance activities with STAND BY ASSIST to improve safety within 7 day(s). (4.)Mr. Sonido Pederson will maintain NWB LUE throughout all functional mobility within 7 days. (5.)Mr. Sonido Pederson will participate in shoulder HEP with minimal assist per MD orders. ________________________________________________________________________________________________     Outcome: Progressing Towards Goal     PHYSICAL THERAPY: Daily Note and PM 7/24/2019  INPATIENT: Hospital Day: 3  Payor: Liset Willett / Plan: [employer] HMO / Product Type: HMO /      NAME/AGE/GENDER: Say Hernandez is a 68 y.o. male   PRIMARY DIAGNOSIS: Displaced transverse fracture of shaft of humerus, right arm, initial encounter for closed fracture [S42.321A]  Closed displaced transverse fracture of shaft of right humerus, initial encounter [S42.321A]  Closed displaced transverse fracture of shaft of left humerus [S42.322A]  Hypotension [I95.9] Hypovolemic shock (HCC)   Hypovolemic shock (HCC)    Procedure(s) (LRB):  LEFT HUMERUS SHAFT  OPEN REDUCTION INTERNAL FIXATION  (Left)  2 Days Post-Op  ICD-10: Treatment Diagnosis:   · Pain in Left Shoulder (M25.512)  · Stiffness of Left Shoulder, Not elsewhere classified (M25.612)  · Generalized Muscle Weakness (M62.81)  · Difficulty in walking, Not elsewhere classified (R26.2)  · History of falling (Z91.81)   Precaution/Allergies:  Patient has no known allergies.    PER MD ORDERS  Active and passive range of motion left elbow hand  Gentle pendulums  No other shoulder motion  nwb ue left  wbat ble  Sling left shoulder     ASSESSMENT:     Mr. Miguelito Barnes presents supine on contact s/p above procedure. Pt had a fall on 6/25th when he sustained this fracture that has been repaired. It was not his first fall, has had several recently. Also with increasing confusion prior to these admissions. He was admitted to hospital on that fall in June. Left hospital and went to a rehab facility. Per family he came from rehab to the hospital. Wife states at rehab had not been walking or sitting on the side of the bed very much at all. This pt presents with generalized weakness and decreased mobility. Will need skilled PT interventions to maximize independence with mobility and work on shoulder HEP. Discussed with SW, pt will need extensive rehab.   7/24 - pt. Supine on contact. Pt.'s eyes open, making very few verbalizations. He did answer his wife most of the time but rarely answered my questions. He was total assistance/max assist x 2-3 people to sit up. He did use his right UE a little to go supine sit. He has difficulty moving any part of his body. Once sitting, he did sit most of the time CGA. CNA and RN present to bath and change gown. He performed elbow and wrist exercises listed below, mostly prom. He could wiggle his fingers a little, swelling present in hand. We attempted to stand x 2 with several folks but patient was unable to stand. He sat up probably 20 minutes and returned to supine. He was rolled several times for clean up and brief change. He was left supine with family. He needs significant assistance with all functional mobility and will need extensive rehab.  7/24 pm - pt. Seen again this pm. He is supine in bed asleep on O2. Pt. [place] and received aarom/prom left hand/wrist/elbow. Wife present. He did not want to get up and was asleep most of the session. Wife reports too sleepy to move as he just had some pain meds. No other mobility this afternoon.   Arm positioned for comfort. Declined ice or needs. This section established at most recent assessment   PROBLEM LIST (Impairments causing functional limitations):  1. Decreased [place] with Bed Mobility  2. Decreased [place] with Transfers  3. Decreased [place] with Ambulation   4. Decreased [place] with shoulder HEP   INTERVENTIONS PLANNED: (Benefits and precautions of physical therapy have been discussed with the patient.)  1. Bed Mobility Training  2. Transfer Training  3. Gait Training  4. Therapeutic Exercises per MD orders  5. Modalities for Pain     TREATMENT PLAN: Frequency/Duration: twice daily for duration of hospital stay  Rehabilitation Potential For Stated Goals: Good     RECOMMENDED REHABILITATION/EQUIPMENT: (at time of discharge pending progress): Continue Skilled Therapy, Rehab and Discussed with Case Management. HISTORY:   History of Present Injury/Illness (Reason for Referral):  Pt s/p above procedure  Past Medical History/Comorbidities:   Mr. Tanya Gallegos  has a past medical history of Anxiety, Arthritis, Atrial flutter ([address] (2/3/2016), CAD (coronary artery disease), Cardiomyopathy ([address] Chronic systolic heart failure ([address] (10/20/2010), CKD (chronic kidney disease) stage 3, GFR 30-59 ml/min ([address] (10/18/2010), Diabetic peripheral neuropathy ([address] (9/15/2015), Dyslipidemia (10/18/2010), Edema, Essential hypertension, benign (09/15/2015), H/O falling, Insomnia (9/15/2015), Mixed hyperlipidemia (9/15/2015), Morbid obesity ([address] (10/18/2010), Nephrolithiasis, Oxygen dependent, PAD (peripheral artery disease) ([address] Peripheral neuropathy, Pneumonia (07/04/2019), Renal insufficiency, Right heart failure ([address] S/P CABG (coronary artery bypass graft), Sleep apnea, and Type II or unspecified type diabetes mellitus without mention of complication, uncontrolled ([address] (09/15/2015).   Mr. Tanya Gallegos  has a past surgical history that includes hx urological; hx cataract removal (Bilateral); pr cardiac surg procedure unlist; hx angioplasty; and hx heart catheterization. Social History/Living Environment:   Home Environment: Private residence(but was admitted from SNF)  # Steps to Enter: 2  One/Two Story Residence: One story  Living Alone: No  Support Systems: Spouse/Significant Other/Partner  Patient Expects to be Discharged to[de-identified] Skilled nursing facility  Current DME Used/Available at Home: [employer], straight, Oxygen, portable, Shower chair, Walker, rollator, Walker, rolling, Wheelchair  Tub or Shower Type: Tub/Shower combination  Prior Level of Function/Work/Activity:  Pt was at a rehab facility and prior to that hospital. Before initial hospital admission lived with wife, walked about 50 feet with walker, needed assist for ADLs and was on home oxygen   Number of Personal Factors/Comorbidities that affect the Plan of Care: 3+: HIGH COMPLEXITY   EXAMINATION:   Most Recent Physical Functioning:   Gross Assessment:                  Posture:     Balance:  Sitting: Impaired;High guard  Sitting - Static: Fair (occasional)  Sitting - Dynamic: Poor (constant support)  Standing: (cant stand) Bed Mobility:  Rolling: Total assistance  Supine to Sit: Assist x2; Additional time; Total assistance;Maximum assistance  Sit to Supine: Assist x2; Additional time; Total assistance;Maximum assistance  Scooting: Total assistance  Duration: 30 Minutes  Wheelchair Mobility:     Transfers:  Sit to Stand: (unable, attempted x 2)  Gait:            Body Structures Involved:  1. Joints  2. Muscles Body Functions Affected:  1. Movement Related Activities and Participation Affected:  1. Mobility  2. Self Care   Number of elements that affect the Plan of Care: 4+: HIGH COMPLEXITY   CLINICAL PRESENTATION:   Presentation: Stable and uncomplicated: LOW COMPLEXITY   CLINICAL DECISION MAKIN [facility] [address] [address] AM-PAC 6 Clicks   Basic Mobility Inpatient Short Form  How much difficulty does the patient currently have. ..  Unable A Lot A Little None   1. Turning over in bed (including adjusting bedclothes, sheets and blankets)? ? 1   ? 2   ? 3   ? 4   2. Sitting down on and standing up from a chair with arms ( e.g., wheelchair, bedside commode, etc.)   ? 1   ? 2   ? 3   ? 4   3. Moving from lying on back to sitting on the side of the bed?   ? 1   ? 2   ? 3   ? 4   How much help from another person does the patient currently need. .. Total A Lot A Little None   4. Moving to and from a bed to a chair (including a wheelchair)? ? 1   ? 2   ? 3   ? 4   5. Need to walk in hospital room? ? 1   ? 2   ? 3   ? 4   6. Climbing 3-5 steps with a railing? ? 1   ? 2   ? 3   ? 4   © 2007, Trustees of [facility] [place] [employer], under license to [employer]. All rights reserved      Score:  Initial: 8 Most Recent: X (Date: -- )    Interpretation of Tool:  Represents activities that are increasingly more difficult (i.e. Bed mobility, Transfers, Gait). Medical Necessity:     · Patient is expected to demonstrate progress in strength, balance, coordination and functional technique  ·  to decrease assistance required with functional mobility   · . Reason for Services/Other Comments:  · Patient continues to require skilled intervention due to inability to complete functional mobility indpendently   · . Use of outcome tool(s) and clinical judgement create a POC that gives a: Clear prediction of patient's progress: LOW COMPLEXITY            TREATMENT:   (In addition to Assessment/Re-Assessment sessions the following treatments were rendered)   Pre-treatment Symptoms/Complaints:  Shoulder pain  Pain: Initial:  Post Session:  Did not rate, seems comfortable     Therapeutic Exercise: (10 Minutes):  Exercises per grid below to improve mobility and strength. Required moderate verbal and manual cues to promote proper body alignment and promote proper body posture. Progressed repetitions as indicated.        Date:  7/23/19 Date:  7/24 Date:     ACTIVITY/EXERCISE AM PM AM PM AM PM   Gripping 10 AA 15 AA 15prom 15aarom     Wrist Flexion/Extension 10 AA 15 AA 15prom 15aarom     Wrist Ulnar/Radial Deviation         Pronation/Supination 10 AA 15 AA 15prom 15aarom     Elbow Flexion/Extension 10 AA 15 AA 15prom 15aarom     Shoulder Flexion/Extension         Shoulder AB/ADduction         Shoulder IR/ER         Pulleys         Pendulums Unable until pt can stand        Shrugs         Isometric:                 Flexion         Extension         ABduction         ADduction         Biceps/Triceps                  B = bilateral; AA = active assistive; A = active; P = passive  Education:  ? Home Exercises  x  Sling Application   ? Movement Precautions   ? Pulleys   x  Use of Ice   ? Other:   Treatment/Session Assessment:    · Response to Treatment: pt. Did fine, low level and slow progress  · Interdisciplinary Collaboration:   o Certified Nursing Assistant/Patient Care Technician  · After treatment position/precautions:   o Supine in bed  o Bed/Chair-wheels locked  o Bed in low position  o Call light within reach  o RN notified  o Family at bedside   · Compliance with Program/Exercises: Will assess as treatment progresses. · Recommendations/Intent for next treatment session:  Treatment next visit will focus on increasing Mr. Jordan Garner independence with bed mobility, transfers, gait training, strength/ROM exercises, modalities for pain, and patient education.    Total Treatment Duration:  PT Patient Time In/Time Out  Time In: 1425  Time Out: [address]

## 2019-07-24 NOTE — PROGRESS NOTES
Orthopedic Joint Progress Note    2019  Admit Date: 2019  Admit Diagnosis: Displaced transverse fracture of shaft of humerus, right arm, initial encounter for closed fracture [S42.321A]  Closed displaced transverse fracture of shaft of right humerus, initial encounter [S42.321A]  Closed displaced transverse fracture of shaft of left humerus [S42.322A]  Hypotension [I95.9]    2 Days Post-Op    Subjective: awake answering questions     Larry Sanchez     Review of Systems: Pertinent items are noted in HPI. Objective:     PT/OT:     PATIENT MOBILITY    Bed Mobility  Supine to Sit: Maximum assistance, Total assistance, Additional time, Assist x2, Other (comment)(plus one more person)  Sit to Supine: Maximum assistance, Total assistance, Assist x2, Other (comment)(plus one more person)  Scooting: Total assistance                      Vital Signs:    Blood pressure 150/72, pulse 78, temperature 98 °F (36.7 °C), resp. rate 24, height 6' (1.829 m), weight 152 kg (335 lb 3.2 oz), SpO2 96 %.   Temp (24hrs), Av.5 °F (36.9 °C), Min:97.6 °F (36.4 °C), Max:99.1 °F (37.3 °C)      Pain Control:   Pain Assessment  Pain Scale 1: (some pain with exercises)  Pain Intensity 1: 0    Meds:  Current Facility-Administered Medications   Medication Dose Route Frequency    magnesium sulfate 2 g/50 ml IVPB (premix or compounded)  2 g IntraVENous ONCE    alcohol 62% (NOZIN) nasal  1 Ampule  1 Ampule Topical Q12H    albuterol-ipratropium (DUO-NEB) 2.5 MG-0.5 MG/3 ML  3 mL Nebulization Q6H PRN    aspirin chewable tablet 81 mg  81 mg Oral DAILY    cefTRIAXone (ROCEPHIN) 1 g in 0.9% sodium chloride (MBP/ADV) 50 mL  1 g IntraVENous Q24H    warfarin (COUMADIN) tablet 5 mg  5 mg Oral Q MON, WED & FRI    warfarin (COUMADIN) tablet 2.5 mg  2.5 mg Oral Once per day on Sun Tu Thu Sat    tuberculin injection 5 Units  5 Units IntraDERMal ONCE    sodium chloride (NS) flush 5-40 mL  5-40 mL IntraVENous Q8H    sodium chloride (NS) flush 5-40 mL  5-40 mL IntraVENous PRN    bisacodyl (DULCOLAX) suppository 10 mg  10 mg Rectal DAILY PRN    sodium phosphate (FLEET'S) enema 1 Enema  1 Enema Rectal PRN    promethazine (PHENERGAN) tablet 25 mg  25 mg Oral Q4H PRN    docusate sodium (COLACE) capsule 100 mg  100 mg Oral BID    ferrous sulfate tablet 325 mg  1 Tab Oral BID WITH MEALS    tuberculin injection 5 Units  5 Units IntraDERMal ONCE    oxyCODONE IR (ROXICODONE) tablet 5 mg  5 mg Oral Q3H PRN    oxyCODONE IR (ROXICODONE) tablet 10 mg  10 mg Oral Q4H PRN    oxyCODONE IR (ROXICODONE) tablet 15 mg  15 mg Oral Q3H PRN    0.9% sodium chloride infusion 250 mL  250 mL IntraVENous PRN    PHENYLephrine (GILMAR-SYNEPHRINE) 40 mg in 250 mL NS infusion   mcg/min IntraVENous TITRATE    insulin lispro (HUMALOG) injection   SubCUTAneous AC&HS    LORazepam (ATIVAN) tablet 0.5 mg  0.5 mg Oral Q8H PRN    atorvastatin (LIPITOR) tablet 40 mg  40 mg Oral QHS    azelastine (ASTELIN) 137mcg/spray nasal spray (Patient Supplied)  1 Spray Both Nostrils BID    azelastine (OPTIVAR) 0.05 % ophthalmic solution drop 1 Drop  (Patient Supplied)  1 Drop Both Eyes BID PRN    simethicone (MYLICON) tablet 80 mg  80 mg Oral QID PRN        LAB:    Lab Results   Component Value Date/Time    INR 2.2 07/24/2019 01:17 AM    INR 2.2 07/23/2019 01:29 AM    INR 2.4 07/22/2019 06:42 PM     Lab Results   Component Value Date/Time    HGB 9.7 (L) 07/24/2019 06:34 AM    HGB 9.1 (L) 07/24/2019 01:17 AM    HGB 8.9 (L) 07/23/2019 07:09 PM       Wound Sacral/coccyx (Active)   Dressing Status  Clean, dry, and intact 7/23/2019  2:22 PM   Dressing Type  Foam;Silicone 2/82/2850  0:73 PM   Non-Pressure Injury Partial thickness (epider/derm) 7/23/2019  2:22 PM   Pressure Injury Stage 2 7/23/2019  2:22 PM   Wound Length (cm) 3 cm 7/23/2019  2:22 PM   Wound Width (cm) 3 cm 7/23/2019  2:22 PM   Wound Depth (cm) 0.1 7/23/2019  2:22 PM   Wound Surface area (cm^2) 9 cm^2 7/23/2019  2:22 PM   Condition of [place] 7/23/2019  2:22 PM   Tissue Type Pink 7/23/2019  2:22 PM   Tissue Type Percent Pink 100 7/23/2019  2:22 PM   Drainage Amount  Scant 7/23/2019  2:22 PM   Drainage Color Serosanguinous 7/23/2019  2:22 PM   Wound Odor None 7/23/2019  2:22 PM   Periwound Skin Condition Erythema, blanchable 7/23/2019  2:22 PM   Cleansing and Cleansing Agents  Normal saline 7/23/2019  2:22 PM   Dressing Type Applied Foam;Silicone 4/31/6479  4:57 PM   Procedure Tolerated Well 7/23/2019  2:22 PM   Number of days: 8389       Wound Shoulder Left (Active)   Dressing Status Clean, dry, and intact 7/23/2019 10:10 PM   Dressing Type Compression dressing 7/22/2019  8:30 PM   Number of days: 2       [REMOVED] Wound Pretibial Distal;Left (Removed)   Number of days: 2       [REMOVED] Wound Leg lower Left;Posterior (Removed)   Number of days: 2         Physical Exam:  No significant changes    Assessment:      Principal Problem:    Hypovolemic shock ([address] (7/22/2019)    Active Problems:    CKD (chronic kidney disease) stage 3, GFR 30-59 ml/min ([address] (10/18/2010)      Morbid obesity ([address] (10/18/2010)      Essential hypertension, benign (9/15/2015)      Diabetes mellitus type 2, controlled ([address] (12/15/2015)      JASON (obstructive sleep apnea) (4/7/2016)      Closed displaced transverse fracture of shaft of left humerus (7/17/2019)      Closed displaced transverse fracture of shaft of right humerus (7/22/2019)      Hypotension (7/22/2019)      Sacral wound (7/22/2019)         Plan:     Continue PT/OT/Rehab  Replete magnesium 1.6   hemoglobin stable 9.7  Creatinine improving 1.48  inr 2.2  Decubiti dressed and patient on air mattress  Change dressing  Observe  Continue care  Transfer to rehab when stable    Patient Expects to be Discharged to[de-identified] Skilled nursing facility

## 2019-07-24 NOTE — PROGRESS NOTES
Warfarin dosing per pharmacist    Hieu Sharpe is a 68 y.o. male. @Five minutesME(77)@    @Familybuilder(78)@    Indication:  atrial fibrillation    Goal INR:  2 - 3    Home dose:  5 mg Mon/Wed/Fri and 2.5 mg Tue/Thur/Sat/Sun    Risk factors or significant drug interactions: Other anticoagulants:      Daily Monitoring  Date  INR     Warfarin dose HGB              Notes  7/24                2.2                    5 mg               9.7    7/23  2.2  2.5 mg  9.4    Will re-start home dose of Warfarin: 5 mg MWF and 2.5 mg Tuesday, Thursday, Saturday and Sunday. Clinical pharmacist will continue to monitor closely.     Thank you,  Amy Thomas PharmD, Board Certified Pharmacotherapy Specialist

## 2019-07-24 NOTE — PROGRESS NOTES
Swelling in hand has decreased. Pt is able to move fingers and has + 2 radial pulse. No needs at this time.

## 2019-07-24 NOTE — PROGRESS NOTES
Surgical dressing removed per physician's verbal order. Guaze and Tegaderm applied. Staples intact with no bleeding. Ice pack applied to decrease swelling.

## 2019-07-24 NOTE — PROGRESS NOTES
Hospitalist Progress Note    2019  Admit Date: 2019 11:40 AM   NAME: Nupur Desouza   :  1950   MRN:  [id number]   Attending: Keerthi Guzmán DO  PCP:  Stacie Collier MD    SUBJECTIVE:   Patient is a 75yo M with hx Afib/flutter on coumadin, CAD, CHF, CKD, DM, and JASON who was recently hospitalized for falls and acute encephalopathy. He was found at that time to have L humerus fracture. Treated initially nonoperatively. Encephalopathy found to be due to polypharmacy and improved with decreased sedating medications. Pt has been in SNF and continued to have problems with arm, was schedule for surgical fixation . Pt became hypotensive during/after surgery and received phenylephrine. Transferred to ICU for blood transfusion and possible pressors, but were not needed after fluids and blood. : [facility]. Refused cpap. BP stable, moved out of ICU yesterday. Mental status improving but still slow to answer some questions. Answers appropriate. Hgb stable. UZMA resolved. Mg replaced. Back on coumadin per pharmacy.     Review of Systems negative with exception of pertinent positives noted above  PHYSICAL EXAM     Visit Vitals  /66 (BP 1 Location: Right arm, BP Patient Position: At rest)   Pulse 75   Temp 97.9 °F (36.6 °C)   Resp 22   Ht 6' (1.829 m)   Wt 152 kg (335 lb 3.2 oz)   SpO2 98%   BMI 45.46 kg/m²      Temp (24hrs), Av.3 °F (36.8 °C), Min:97.6 °F (36.4 °C), Max:99.1 °F (37.3 °C)    Oxygen Therapy  O2 Sat (%): 98 % (19 0707)  Pulse via Oximetry: 66 beats per minute (19 0900)  O2 Device: Nasal cannula (19)  O2 Flow Rate (L/min): 3 l/min (19)    Intake/Output Summary (Last 24 hours) at 2019 0811  Last data filed at 2019 0631  Gross per 24 hour   Intake 600 ml   Output 1975 ml   Net -1375 ml      General: Morbidly obese, NAD  Lungs:  diminished   Heart:  IRIR  Abdomen: Soft, Non distended, Non tender, Positive bowel sounds  Extremities: L arm in sling  Neurologic:  Oriented to person, place, time, situation. Reports poor recall of last night/day. Slow to answer questions. XR HUMERUS LT   Final Result   IMPRESSION:    Postsurgical change as described. XR HUMERUS LT    (Results Pending)         ASSESSMENT      Active Hospital Problems    Diagnosis Date Noted    Closed displaced transverse fracture of shaft of right humerus 07/22/2019    Hypotension 07/22/2019    Hypovolemic shock ([address] 07/22/2019    Sacral wound 07/22/2019    Closed displaced transverse fracture of shaft of left humerus 07/17/2019    JASON (obstructive sleep apnea) 04/07/2016    Diabetes mellitus type 2, controlled ([address] 12/15/2015    Essential hypertension, benign 09/15/2015    CKD (chronic kidney disease) stage 3, GFR 30-59 ml/min ([address] 10/18/2010    Morbid obesity ([address] 10/18/2010     Plan:    Hypovolemic shock: resolved    Anemia: improving, s/p 2u PRBC. Encephalopathy: somewhat of a chronic issue, prior attributed to polypharmacy last admission, now postop with recent anaesthesia and pain meds. May have some underlying neurodegenerative condition to be worked up but should focus now on reducing altering medications. Improving, but warrants outpatient follow-up after discharge. Do not restart ambien/lyrica/ativan at discharge. Minimize narcotic pain meds. CHF: restart bumex, still holding losartan     Afib: okay to restart coumadin    Sacral wound: wound care    DM: A1c 8.5. SSI    Pyuria: culture pending. Treat in setting of confusion, leukocytosis: rocephin. Received intraop vanc. Leukocytosis improving.     DVT Prophylaxis: coumadin  Dispo: SNF pending    Signed By: Mundo Calixto MD     July 24, 2019

## 2019-07-25 NOTE — PROGRESS NOTES
Care Management Interventions  Mode of Transport at Discharge: BLS  Transition of Care Consult (CM Consult): LTAC  Physical Therapy Consult: Yes  Occupational Therapy Consult: Yes  Current Support Network: Lives with Spouse  Confirm Follow Up Transport: Family  Plan discussed with Pt/Family/Caregiver: Yes  Freedom of Choice Offered: Yes  Discharge Location  Discharge Placement: [facility] (LTAC)  BEATRIS received call from [facility] from Margaret Gordon who states she received insurance approval for [employer]. BEATRIS spoke with hospitalist and spouse. Pt will transport today via ambulance.   Mita Maxwell

## 2019-07-25 NOTE — PROGRESS NOTES
Orthopedic Joint Progress Note    2019  Admit Date: 2019  Admit Diagnosis: Displaced transverse fracture of shaft of humerus, right arm, initial encounter for closed fracture [S42.321A]  Closed displaced transverse fracture of shaft of right humerus, initial encounter [S42.321A]  Closed displaced transverse fracture of shaft of left humerus [S42.322A]  Hypotension [I95.9]    3 Days Post-Op    Subjective: no complaints     David Hill     Review of Systems: Pertinent items are noted in HPI. Objective:     PT/OT:     PATIENT MOBILITY    Bed Mobility  Rolling: Total assistance  Supine to Sit: Assist x2, Additional time, Total assistance, Maximum assistance  Sit to Supine: Assist x2, Additional time, Total assistance, Maximum assistance  Scooting: Total assistance  Transfers  Sit to Stand: (unable, attempted x 2)                   Vital Signs:    Blood pressure 119/66, pulse 67, temperature 97.5 °F (36.4 °C), resp. rate 19, height 6' (1.829 m), weight 151 kg (333 lb), SpO2 93 %.   Temp (24hrs), Av.2 °F (36.2 °C), Min:96.2 °F (35.7 °C), Max:97.9 °F (36.6 °C)      Pain Control:   Pain Assessment  Pain Scale 1: FLACC  Pain Intensity 1: 0  Pain Location 1: Shoulder  Pain Orientation 1: Left  Pain Description 1: Aching  Pain Intervention(s) 1: Medication (see MAR)    Meds:  Current Facility-Administered Medications   Medication Dose Route Frequency    bumetanide (BUMEX) tablet 1 mg  1 mg Oral DAILY    benzocaine-menthol (CEPACOL) lozenge  1 Lozenge Oral Q2H PRN    alcohol 62% (NOZIN) nasal  1 Ampule  1 Ampule Topical Q12H    albuterol-ipratropium (DUO-NEB) 2.5 MG-0.5 MG/3 ML  3 mL Nebulization Q6H PRN    aspirin chewable tablet 81 mg  81 mg Oral DAILY    cefTRIAXone (ROCEPHIN) 1 g in 0.9% sodium chloride (MBP/ADV) 50 mL  1 g IntraVENous Q24H    warfarin (COUMADIN) tablet 5 mg  5 mg Oral Q MON, WED & FRI    warfarin (COUMADIN) tablet 2.5 mg  2.5 mg Oral Once per day on Sun Tue Thu Sat    sodium chloride (NS) flush 5-40 mL  5-40 mL IntraVENous Q8H    sodium chloride (NS) flush 5-40 mL  5-40 mL IntraVENous PRN    bisacodyl (DULCOLAX) suppository 10 mg  10 mg Rectal DAILY PRN    sodium phosphate (FLEET'S) enema 1 Enema  1 Enema Rectal PRN    promethazine (PHENERGAN) tablet 25 mg  25 mg Oral Q4H PRN    docusate sodium (COLACE) capsule 100 mg  100 mg Oral BID    ferrous sulfate tablet 325 mg  1 Tab Oral BID WITH MEALS    oxyCODONE IR (ROXICODONE) tablet 5 mg  5 mg Oral Q3H PRN    oxyCODONE IR (ROXICODONE) tablet 10 mg  10 mg Oral Q4H PRN    oxyCODONE IR (ROXICODONE) tablet 15 mg  15 mg Oral Q3H PRN    0.9% sodium chloride infusion 250 mL  250 mL IntraVENous PRN    PHENYLephrine (GILMAR-SYNEPHRINE) 40 mg in 250 mL NS infusion   mcg/min IntraVENous TITRATE    insulin lispro (HUMALOG) injection   SubCUTAneous AC&HS    LORazepam (ATIVAN) tablet 0.5 mg  0.5 mg Oral Q8H PRN    atorvastatin (LIPITOR) tablet 40 mg  40 mg Oral QHS    azelastine (ASTELIN) 137mcg/spray nasal spray (Patient Supplied)  1 Spray Both Nostrils BID    azelastine (OPTIVAR) 0.05 % ophthalmic solution drop 1 Drop  (Patient Supplied)  1 Drop Both Eyes BID PRN    simethicone (MYLICON) tablet 80 mg  80 mg Oral QID PRN        LAB:    Lab Results   Component Value Date/Time    INR 2.2 07/25/2019 04:22 AM    INR 2.2 07/24/2019 01:17 AM    INR 2.2 07/23/2019 01:29 AM     Lab Results   Component Value Date/Time    HGB 9.1 (L) 07/25/2019 04:22 AM    HGB 9.7 (L) 07/24/2019 06:34 AM    HGB 9.1 (L) 07/24/2019 01:17 AM       Wound Sacral/coccyx (Active)   Dressing Status  Clean, dry, and intact 7/24/2019  7:49 PM   Dressing Type  Other (Comment) 7/24/2019  7:49 PM   Non-Pressure Injury Partial thickness (epider/derm) 7/23/2019  2:22 PM   Pressure Injury Stage 2 7/23/2019  2:22 PM   Wound Length (cm) 3 cm 7/23/2019  2:22 PM   Wound Width (cm) 3 cm 7/23/2019  2:22 PM   Wound Depth (cm) 0.1 7/23/2019  2:22 PM   Wound Surface area (cm^2) 9 cm^2 7/23/2019  2:22 PM   Condition of [place] 7/23/2019  2:22 PM   Tissue Type Pink 7/23/2019  2:22 PM   Tissue Type Percent Pink 100 7/23/2019  2:22 PM   Drainage Amount  Scant 7/23/2019  2:22 PM   Drainage Color Serosanguinous 7/23/2019  2:22 PM   Wound Odor None 7/23/2019  2:22 PM   Periwound Skin Condition Erythema, blanchable 7/23/2019  2:22 PM   Cleansing and Cleansing Agents  Normal saline 7/23/2019  2:22 PM   Dressing Type Applied Foam;Silicone 1/86/4548  4:19 PM   Procedure Tolerated Well 7/23/2019  2:22 PM   Number of days: 1147       Wound Shoulder Left (Active)   Dressing Status Clean, dry, and intact 7/24/2019  7:49 PM   Dressing Type Aquacel 7/24/2019  7:49 PM   Number of days: 3       [REMOVED] Wound Pretibial Distal;Left (Removed)   Number of days: 2       [REMOVED] Wound Leg lower Left;Posterior (Removed)   Number of days: 2         Physical Exam:  No significant changes    Assessment:      Principal Problem:    Hypovolemic shock ([address] (7/22/2019)    Active Problems:    CKD (chronic kidney disease) stage 3, GFR 30-59 ml/min ([address] (10/18/2010)      Morbid obesity ([address] (10/18/2010)      Essential hypertension, benign (9/15/2015)      Diabetes mellitus type 2, controlled ([address] (12/15/2015)      JASON (obstructive sleep apnea) (4/7/2016)      Closed displaced transverse fracture of shaft of left humerus (7/17/2019)      Closed displaced transverse fracture of shaft of right humerus (7/22/2019)      Hypotension (7/22/2019)      Sacral wound (7/22/2019)         Plan:     Continue PT/OT/Rehab  Observe  Continue care  Transfer to rehab when available    Patient Expects to be Discharged to[de-identified] Skilled nursing facility

## 2019-07-25 NOTE — PROGRESS NOTES
Hospitalist Progress Note    2019  Admit Date: 2019 11:40 AM   NAME: Josefina Vegas   :  1950   MRN:  [id number]   Attending: Sparkle Meneses DO  PCP:  Brandi Valle MD    SUBJECTIVE:   Patient is a 77yo M with hx Afib/flutter on coumadin, CAD, CHF, CKD, DM, and JASON who was recently hospitalized for falls and acute encephalopathy. He was found at that time to have L humerus fracture. Treated initially nonoperatively. Encephalopathy found to be due to polypharmacy and improved with decreased sedating medications. Pt has been in SNF and continued to have problems with arm, was schedule for surgical fixation . Pt became hypotensive during/after surgery and received phenylephrine. Transferred to ICU for blood transfusion and possible pressors, but were not needed after fluids and blood. Interval History (): patient examined at bedside. No acute events overnight. Patient will state his name, where he is at, and year but does not have insight into his medical condition; furthermore, patient needs constant reminder of what is going on right now as well as what was discussed several hours ago. Wife at bedside, states that this is completely different compared to several months ago. She has noticed that he has been \"reaching out and yelling out\" in his sleep and he has had difficulties with his balance leading to several falls.     Review of Systems negative with exception of pertinent positives noted above  PHYSICAL EXAM     Visit Vitals  /52   Pulse 61   Temp 97.8 °F (36.6 °C)   Resp 18   Ht 6' (1.829 m)   Wt 151 kg (333 lb)   SpO2 95%   BMI 45.16 kg/m²      Temp (24hrs), Av.1 °F (36.2 °C), Min:96.2 °F (35.7 °C), Max:97.8 °F (36.6 °C)    Oxygen Therapy  O2 Sat (%): 95 % (19 1237)  Pulse via Oximetry: 70 beats per minute (19 0756)  O2 Device: Nasal cannula (19 1237)  O2 Flow Rate (L/min): 2 l/min (19 1237)    Intake/Output Summary (Last 24 hours) at 7/25/2019 1355  Last data filed at 7/24/2019 1900  Gross per 24 hour   Intake 360 ml   Output    Net 360 ml      General: Morbidly obese  Lungs:  CTA b/l without no R/R/W  Heart:  RRR, +S1, +S2  Abdomen: Soft, Non distended, Non tender, Positive bowel sounds  Extremities: L arm in sling  Neurologic:  Oriented to person, place, time but not situation. No focal neurologic deficits  Skin:  Erythema and blanchable skin on bilateral buttocks    XR HUMERUS LT   Final Result   Impression: Postoperative findings as above. XR HUMERUS LT   Final Result   IMPRESSION:    Postsurgical change as described.             ASSESSMENT      Active Hospital Problems    Diagnosis Date Noted    Closed displaced transverse fracture of shaft of right humerus 07/22/2019    Hypotension 07/22/2019    Hypovolemic shock ([address] 07/22/2019    Sacral wound 07/22/2019    Closed displaced transverse fracture of shaft of left humerus 07/17/2019    JASON (obstructive sleep apnea) 04/07/2016    Diabetes mellitus type 2, controlled ([address] 12/15/2015    Essential hypertension, benign 09/15/2015    CKD (chronic kidney disease) stage 3, GFR 30-59 ml/min ([address] 10/18/2010    Morbid obesity ([address] 10/18/2010     Plan:    # Acute encephalopathy, uncertain etiology (?sepsis from CAUTI vs medication-induced vs ?other)  - patient with urine culture growing >100,000 CFU Staph aureus that may be related to CAUTI  - switch antibiotic regimen to vancomycin empirically while awaiting susceptibilities   - ordered blood cultures as Staph aureus was found in urine culture  - avoid all potentially sedating medications, and narcotics  - ordered serum labs for further evaluation (vitamin B12, folate, thiamine, ammonia, RPR)  - given history provided by wife, other neurocognitive disorders are possible, however, want to rule out all other potentially reversible causes (medication-induced, infection, etc) first  - wound management for sacral/bilateral buttock wounds    # ? Shock  - resolved, uncertain if this was distributive shock (sepsis/anesthesia) or hypovolemic shock  - patient received 2 units pRBCs  - workup as above    # History of heart failure  - continue with Bumex   - holding losartan    # Afib  - restarted on warfarin, pharmacy to dose    # DM type II  - add Lantus 10 units  - Humalog SSI and serial CBGs  - goal CBGs between 140-180 while inpatient  - hold any home oral diabetes meds until discharge    # BISHOP  - patient s/p 2 units pRBCs  - continue iron supplementation  - transfuse for Hgb<7    F/E/N: no fluids, replete electrolytes as needed, diabetic diet    Ppx: warfarin for VTE    Code Status: FULL CODE    Disposition: pending workup as above. Discussed with patient and wife at bedside. Patient will go to rehab following hospital discharge. All questions answered.      Signed By: Leah Cummings DO     July 25, 2019

## 2019-07-25 NOTE — PROGRESS NOTES
Problem: Mobility Impaired (Adult and Pediatric)  Goal: *Acute Goals and Plan of Care (Insert Text)  Description  (1.)Mr. Henny Cannon will move from supine to sit and sit to supine , scoot up and down and roll side to side with MAXIMAL ASSIST within 7 treatment day(s). (2.)Mr. Henny Cannon will transfer from sit to stand and stand to sit with maximal assist of 2 people within 7 treatment days. (3.)Mr. Henny Cannon will perform seated static and dynamic balance activities with STAND BY ASSIST to improve safety within 7 day(s). (4.)Mr. Henny Cannon will maintain NWB LUE throughout all functional mobility within 7 days. (5.)Mr. Henny Cannon will participate in shoulder HEP with minimal assist per MD orders. ________________________________________________________________________________________________     Outcome: Progressing Towards Goal     PHYSICAL THERAPY: Daily Note and PM 7/25/2019  INPATIENT: Hospital Day: 4  Payor: Juanito He / Plan: [employer] / Product Type: HMO /      NAME/AGE/GENDER: Ebony Cai is a 68 y.o. male   PRIMARY DIAGNOSIS: Displaced transverse fracture of shaft of humerus, right arm, initial encounter for closed fracture [S42.321A]  Closed displaced transverse fracture of shaft of right humerus, initial encounter [S42.321A]  Closed displaced transverse fracture of shaft of left humerus [S42.322A]  Hypotension [I95.9] Hypovolemic shock (HCC)   Hypovolemic shock (HCC)    Procedure(s) (LRB):  LEFT HUMERUS SHAFT  OPEN REDUCTION INTERNAL FIXATION  (Left)  3 Days Post-Op  ICD-10: Treatment Diagnosis:   · Pain in Left Shoulder (M25.512)  · Stiffness of Left Shoulder, Not elsewhere classified (M25.612)  · Generalized Muscle Weakness (M62.81)  · Difficulty in walking, Not elsewhere classified (R26.2)  · History of falling (Z91.81)   Precaution/Allergies:  Patient has no known allergies.    PER MD ORDERS  Active and passive range of motion left elbow hand  Gentle pendulums  No other shoulder motion  nwb ue left  wbat ble  Sling left shoulder     ASSESSMENT:     Mr. Jerene Favre presents supine on contact s/p above procedure. Pt had a fall on 6/25th when he sustained this fracture that has been repaired. It was not his first fall, has had several recently. Also with increasing confusion prior to these admissions. He was admitted to hospital on that fall in June. Left hospital and went to a rehab facility. Per family he came from rehab to the hospital. Wife states at rehab had not been walking or sitting on the side of the bed very much at all. This pt presents with generalized weakness and decreased mobility. Will need skilled PT interventions to maximize independence with mobility and work on shoulder HEP. Discussed with SW, pt will need extensive rehab.   7/25- am.  Pt. More talkative today. He still has some confusion. Wife present. He refused to sit up, multiple attempts even with wife encouraging him. He was changed, cleaned up, and bed changed with RN and PCT. This required bed mobility, scooting, and rolling several times. He needed significant assistance and time with all bed mobility. He doesn't move much. He performed some left wrist and elbow exercises. He did participate more with rom of left wrist and elbow. He doesn't like to perform elbow extension and reported some pain. Will continue mobility efforts. 7/25 pm still confused. He did not want to sit up. Therapist performs PROM while pt open/closes his eyes. He remain in the bed with call light near. This section established at most recent assessment   PROBLEM LIST (Impairments causing functional limitations):  1. Decreased [place] with Bed Mobility  2. Decreased [place] with Transfers  3. Decreased [place] with Ambulation   4. Decreased [place] with shoulder HEP   INTERVENTIONS PLANNED: (Benefits and precautions of physical therapy have been discussed with the patient.)  1.  Bed Mobility Training  2. Transfer Training  3. Gait Training  4. Therapeutic Exercises per MD orders  5. Modalities for Pain     TREATMENT PLAN: Frequency/Duration: twice daily for duration of hospital stay  Rehabilitation Potential For Stated Goals: Good     RECOMMENDED REHABILITATION/EQUIPMENT: (at time of discharge pending progress): Continue Skilled Therapy, Rehab and Discussed with Case Management. HISTORY:   History of Present Injury/Illness (Reason for Referral):  Pt s/p above procedure  Past Medical History/Comorbidities:   Mr. Jerene Favre  has a past medical history of Anxiety, Arthritis, Atrial flutter ([facility] [address] (2/3/2016), CAD (coronary artery disease), Cardiomyopathy ([address] Chronic systolic heart failure ([address] (10/20/2010), CKD (chronic kidney disease) stage 3, GFR 30-59 ml/min ([address] (10/18/2010), Diabetic peripheral neuropathy ([facility] [address] (9/15/2015), Dyslipidemia (10/18/2010), Edema, Essential hypertension, benign (09/15/2015), H/O falling, Insomnia (9/15/2015), Mixed hyperlipidemia (9/15/2015), Morbid obesity ([address] (10/18/2010), Nephrolithiasis, Oxygen dependent, PAD (peripheral artery disease) ([address] Peripheral neuropathy, Pneumonia (07/04/2019), Renal insufficiency, Right heart failure ([address] S/P CABG (coronary artery bypass graft), Sleep apnea, and Type II or unspecified type diabetes mellitus without mention of complication, uncontrolled ([address] (09/15/2015). Mr. Jerene Favre  has a past surgical history that includes hx urological; hx cataract removal (Bilateral); pr cardiac surg procedure unlist; hx angioplasty; and hx heart catheterization.   Social History/Living Environment:   Home Environment: Private residence(but was admitted from SNF)  # Steps to Enter: 2  One/Two Story Residence: One story  Living Alone: No  Support Systems: Spouse/Significant Other/Partner  Patient Expects to be Discharged to[de-identified] Skilled nursing facility  Current DME Used/Available at Home: Cane, straight, Oxygen, portable, Shower chair, Walker, rollator, Walker, rolling, Wheelchair  Tub or Shower Type: Tub/Shower combination  Prior Level of Function/Work/Activity:  Pt was at a rehab facility and prior to that hospital. Before initial hospital admission lived with wife, walked about 50 feet with walker, needed assist for ADLs and was on home oxygen   Number of Personal Factors/Comorbidities that affect the Plan of Care: 3+: HIGH COMPLEXITY   EXAMINATION:   Most Recent Physical Functioning:   Gross Assessment:                  Posture:     Balance:    Bed Mobility:     Wheelchair Mobility:     Transfers:     Gait:            Body Structures Involved:  1. Joints  2. Muscles Body Functions Affected:  1. Movement Related Activities and Participation Affected:  1. Mobility  2. Self Care   Number of elements that affect the Plan of Care: 4+: HIGH COMPLEXITY   CLINICAL PRESENTATION:   Presentation: Stable and uncomplicated: LOW COMPLEXITY   CLINICAL DECISION MAKIN[address] [place] [id number] AM-PAC 6 Clicks   Basic Mobility Inpatient Short Form  How much difficulty does the patient currently have. .. Unable A Lot A Little None   1. Turning over in bed (including adjusting bedclothes, sheets and blankets)? ? 1   ? 2   ? 3   ? 4   2. Sitting down on and standing up from a chair with arms ( e.g., wheelchair, bedside commode, etc.)   ? 1   ? 2   ? 3   ? 4   3. Moving from lying on back to sitting on the side of the bed?   ? 1   ? 2   ? 3   ? 4   How much help from another person does the patient currently need. .. Total A Lot A Little None   4. Moving to and from a bed to a chair (including a wheelchair)? ? 1   ? 2   ? 3   ? 4   5. Need to walk in hospital room? ? 1   ? 2   ? 3   ? 4   6. Climbing 3-5 steps with a railing? ? 1   ? 2   ? 3   ? 4   © 2007, Trustees of [address] [place] [address], under license to [employer].  All rights reserved      Score:  Initial: 8 Most Recent: X (Date: -- )    Interpretation of Tool:  Represents activities that are increasingly more difficult (i.e. Bed mobility, Transfers, Gait). Medical Necessity:     · Patient is expected to demonstrate progress in strength, balance, coordination and functional technique  ·  to decrease assistance required with functional mobility   · . Reason for Services/Other Comments:  · Patient continues to require skilled intervention due to inability to complete functional mobility indpendently   · . Use of outcome tool(s) and clinical judgement create a POC that gives a: Clear prediction of patient's progress: LOW COMPLEXITY            TREATMENT:   (In addition to Assessment/Re-Assessment sessions the following treatments were rendered)   Pre-treatment Symptoms/Complaints:  Fine per wife  Pain: Initial:  Post Session:  Did not rate, seems comfortable at rest     Therapeutic Exercise: (15 Minutes):  Exercises per grid below to improve mobility and strength. Required moderate verbal and manual cues to promote proper body alignment and promote proper body posture. Progressed repetitions as indicated. Therapeutic Activity: (30 Minutes   ):  Therapeutic activities including Bed transfers and scooting and rolling to improve mobility and strength. Required maximal  assistive for linen, brief change, and clean up.        Date:  7/23/19 Date:  7/24 Date:  7/25   ACTIVITY/EXERCISE AM PM AM PM AM PM   Gripping 10 AA 15 AA 15prom 15aarom 15aa 15 P   Wrist Flexion/Extension 10 AA 15 AA 15prom 15aarom 15aa 15 P   Wrist Ulnar/Radial Deviation         Pronation/Supination 10 AA 15 AA 15prom 15aarom 15aa 15 P   Elbow Flexion/Extension 10 AA 15 AA 15prom 15aarom 15aa 15 P   Shoulder Flexion/Extension         Shoulder AB/ADduction         Shoulder IR/ER         Pulleys         Pendulums Unable until pt can stand        Shrugs         Isometric:                 Flexion         Extension         ABduction         ADduction         Biceps/Triceps                  B = bilateral; AA = active assistive; A = active; P = passive  Education:  ? Home Exercises  x  Sling Application   ? Movement Precautions   ? Pulleys   x  Use of Ice   ? Other:   Treatment/Session Assessment:    · Response to Treatment: pt. PROM  · Interdisciplinary Collaboration:   o Registered Nurse  o Certified Nursing Assistant/Patient Care Technician  · After treatment position/precautions:   o Supine in bed  o Bed/Chair-wheels locked  o Bed in low position  o Call light within reach  o RN notified  o Family at bedside   · Compliance with Program/Exercises: Will assess as treatment progresses. · Recommendations/Intent for next treatment session:  Treatment next visit will focus on increasing Mr. Mayra Goncalves independence with bed mobility, transfers, gait training, strength/ROM exercises, modalities for pain, and patient education.    Total Treatment Duration:  PT Patient Time In/Time Out  Time In: 1410  Time Out: [address] [place]

## 2019-07-25 NOTE — PROGRESS NOTES
Problem: Mobility Impaired (Adult and Pediatric)  Goal: *Acute Goals and Plan of Care (Insert Text)  Description  (1.)Mr. Barney Bowen will move from supine to sit and sit to supine , scoot up and down and roll side to side with MAXIMAL ASSIST within 7 treatment day(s). (2.)Mr. Barney Bowen will transfer from sit to stand and stand to sit with maximal assist of 2 people within 7 treatment days. (3.)Mr. Barney Bowen will perform seated static and dynamic balance activities with STAND BY ASSIST to improve safety within 7 day(s). (4.)Mr. Barney Bowen will maintain NWB LUE throughout all functional mobility within 7 days. (5.)Mr. Barney Bowen will participate in shoulder HEP with minimal assist per MD orders. ________________________________________________________________________________________________     Outcome: Progressing Towards Goal     PHYSICAL THERAPY: Daily Note and AM 7/25/2019  INPATIENT: Hospital Day: 4  Payor: Bladimir Mederos / Plan: [employer] HMO / Product Type: HMO /      NAME/AGE/GENDER: Josefina Vegas is a 68 y.o. male   PRIMARY DIAGNOSIS: Displaced transverse fracture of shaft of humerus, right arm, initial encounter for closed fracture [S42.321A]  Closed displaced transverse fracture of shaft of right humerus, initial encounter [S42.321A]  Closed displaced transverse fracture of shaft of left humerus [S42.322A]  Hypotension [I95.9] Hypovolemic shock (HCC)   Hypovolemic shock (HCC)    Procedure(s) (LRB):  LEFT HUMERUS SHAFT  OPEN REDUCTION INTERNAL FIXATION  (Left)  3 Days Post-Op  ICD-10: Treatment Diagnosis:   · Pain in Left Shoulder (M25.512)  · Stiffness of Left Shoulder, Not elsewhere classified (M25.612)  · Generalized Muscle Weakness (M62.81)  · Difficulty in walking, Not elsewhere classified (R26.2)  · History of falling (Z91.81)   Precaution/Allergies:  Patient has no known allergies.    PER MD ORDERS  Active and passive range of motion left elbow hand  Gentle pendulums  No other shoulder motion  nwb ue left  wbat ble  Sling left shoulder     ASSESSMENT:     Mr. Alis Soto presents supine on contact s/p above procedure. Pt had a fall on 6/25th when he sustained this fracture that has been repaired. It was not his first fall, has had several recently. Also with increasing confusion prior to these admissions. He was admitted to hospital on that fall in June. Left hospital and went to a rehab facility. Per family he came from rehab to the hospital. Wife states at rehab had not been walking or sitting on the side of the bed very much at all. This pt presents with generalized weakness and decreased mobility. Will need skilled PT interventions to maximize independence with mobility and work on shoulder HEP. Discussed with SW, pt will need extensive rehab.   7/25- am.  Pt. More talkative today. He still has some confusion. Wife present. He refused to sit up, multiple attempts even with wife encouraging him. He was changed, cleaned up, and bed changed with RN and PCT. This required bed mobility, scooting, and rolling several times. He needed significant assistance and time with all bed mobility. He doesn't move much. He performed some left wrist and elbow exercises. He did participate more with rom of left wrist and elbow. He doesn't like to perform elbow extension and reported some pain. Will continue mobility efforts. This section established at most recent assessment   PROBLEM LIST (Impairments causing functional limitations):  1. Decreased [place] with Bed Mobility  2. Decreased [place] with Transfers  3. Decreased [place] with Ambulation   4. Decreased [place] with shoulder HEP   INTERVENTIONS PLANNED: (Benefits and precautions of physical therapy have been discussed with the patient.)  1. Bed Mobility Training  2. Transfer Training  3. Gait Training  4. Therapeutic Exercises per MD orders  5.  Modalities for Pain     TREATMENT PLAN: Frequency/Duration: twice daily for duration of hospital stay  Rehabilitation Potential For Stated Goals: Good     RECOMMENDED REHABILITATION/EQUIPMENT: (at time of discharge pending progress): Continue Skilled Therapy, Rehab and Discussed with Case Management. HISTORY:   History of Present Injury/Illness (Reason for Referral):  Pt s/p above procedure  Past Medical History/Comorbidities:   Mr. Della Whitaker  has a past medical history of Anxiety, Arthritis, Atrial flutter ([address] (2/3/2016), CAD (coronary artery disease), Cardiomyopathy ([address] Chronic systolic heart failure ([address] (10/20/2010), CKD (chronic kidney disease) stage 3, GFR 30-59 ml/min ([address] (10/18/2010), Diabetic peripheral neuropathy ([address] (9/15/2015), Dyslipidemia (10/18/2010), Edema, Essential hypertension, benign (09/15/2015), H/O falling, Insomnia (9/15/2015), Mixed hyperlipidemia (9/15/2015), Morbid obesity ([address] (10/18/2010), Nephrolithiasis, Oxygen dependent, PAD (peripheral artery disease) ([address] Peripheral neuropathy, Pneumonia (07/04/2019), Renal insufficiency, Right heart failure ([address] S/P CABG (coronary artery bypass graft), Sleep apnea, and Type II or unspecified type diabetes mellitus without mention of complication, uncontrolled ([address] (09/15/2015). Mr. Della Whitaker  has a past surgical history that includes hx urological; hx cataract removal (Bilateral); pr cardiac surg procedure unlist; hx angioplasty; and hx heart catheterization.   Social History/Living Environment:   Home Environment: Private residence(but was admitted from SNF)  # Steps to Enter: 2  One/Two Story Residence: One story  Living Alone: No  Support Systems: Spouse/Significant Other/Partner  Patient Expects to be Discharged to[de-identified] Skilled nursing facility  Current DME Used/Available at Home: Ayala Pouch, straight, Oxygen, portable, Shower chair, Walker, rollator, Walker, rolling, Wheelchair  Tub or Shower Type: Tub/Shower combination  Prior Level of Function/Work/Activity:  Pt was at a rehab facility and prior to that hospital. Before initial hospital admission lived with wife, walked about 50 feet with walker, needed assist for ADLs and was on home oxygen   Number of Personal Factors/Comorbidities that affect the Plan of Care: 3+: HIGH COMPLEXITY   EXAMINATION:   Most Recent Physical Functioning:   Gross Assessment:                  Posture:     Balance:  Sitting: (refused) Bed Mobility:  Rolling: Total assistance;Assist x2;Maximum assistance  Supine to Sit: (refused)  Scooting: Assist x2; Additional time; Total assistance  Duration: 30 Minutes  Wheelchair Mobility:     Transfers:     Gait:            Body Structures Involved:  1. Joints  2. Muscles Body Functions Affected:  1. Movement Related Activities and Participation Affected:  1. Mobility  2. Self Care   Number of elements that affect the Plan of Care: 4+: HIGH COMPLEXITY   CLINICAL PRESENTATION:   Presentation: Stable and uncomplicated: LOW COMPLEXITY   CLINICAL DECISION MAKIN[address] [place] [id number] AM-PAC 6 Clicks   Basic Mobility Inpatient Short Form  How much difficulty does the patient currently have. .. Unable A Lot A Little None   1. Turning over in bed (including adjusting bedclothes, sheets and blankets)? ? 1   ? 2   ? 3   ? 4   2. Sitting down on and standing up from a chair with arms ( e.g., wheelchair, bedside commode, etc.)   ? 1   ? 2   ? 3   ? 4   3. Moving from lying on back to sitting on the side of the bed?   ? 1   ? 2   ? 3   ? 4   How much help from another person does the patient currently need. .. Total A Lot A Little None   4. Moving to and from a bed to a chair (including a wheelchair)? ? 1   ? 2   ? 3   ? 4   5. Need to walk in hospital room? ? 1   ? 2   ? 3   ? 4   6. Climbing 3-5 steps with a railing? ? 1   ? 2   ? 3   ? 4   © 2007, Trustees of [address] [place] [id number], under license to [employer].  All rights reserved      Score:  Initial: 8 Most Recent: X (Date: -- )    Interpretation of Tool:  Represents activities that are increasingly more difficult (i.e. Bed mobility, Transfers, Gait). Medical Necessity:     · Patient is expected to demonstrate progress in strength, balance, coordination and functional technique  ·  to decrease assistance required with functional mobility   · . Reason for Services/Other Comments:  · Patient continues to require skilled intervention due to inability to complete functional mobility indpendently   · . Use of outcome tool(s) and clinical judgement create a POC that gives a: Clear prediction of patient's progress: LOW COMPLEXITY            TREATMENT:   (In addition to Assessment/Re-Assessment sessions the following treatments were rendered)   Pre-treatment Symptoms/Complaints:  Shoulder pain  Pain: Initial:  Post Session:  Did not rate, seems comfortable at rest     Therapeutic Exercise: (10 Minutes):  Exercises per grid below to improve mobility and strength. Required moderate verbal and manual cues to promote proper body alignment and promote proper body posture. Progressed repetitions as indicated. Therapeutic Activity: (30 Minutes   ):  Therapeutic activities including Bed transfers and scooting and rolling to improve mobility and strength. Required maximal  assistive for linen, brief change, and clean up.        Date:  7/23/19 Date:  7/24 Date:  7/25   ACTIVITY/EXERCISE AM PM AM PM AM PM   Gripping 10 AA 15 AA 15prom 15aarom 15aa    Wrist Flexion/Extension 10 AA 15 AA 15prom 15aarom 15aa    Wrist Ulnar/Radial Deviation         Pronation/Supination 10 AA 15 AA 15prom 15aarom 15aa    Elbow Flexion/Extension 10 AA 15 AA 15prom 15aarom 15aa    Shoulder Flexion/Extension         Shoulder AB/ADduction         Shoulder IR/ER         Pulleys         Pendulums Unable until pt can stand        Shrugs         Isometric:                 Flexion         Extension         ABduction         ADduction         Biceps/Triceps                  B = bilateral; AA = active assistive; A = active; P = passive  Education:  ? Home Exercises  x  Sling Application   ? Movement Precautions   ? Pulleys   x  Use of Ice   ? Other:   Treatment/Session Assessment:    · Response to Treatment: pt. Refusing to sit up, slow progress and doesn't move much  · Interdisciplinary Collaboration:   o Registered Nurse  o Certified Nursing Assistant/Patient Care Technician  · After treatment position/precautions:   o Supine in bed  o Bed/Chair-wheels locked  o Bed in low position  o Call light within reach  o RN notified  o Family at bedside   · Compliance with Program/Exercises: Will assess as treatment progresses. · Recommendations/Intent for next treatment session:  Treatment next visit will focus on increasing Mr. Tonia Triplett independence with bed mobility, transfers, gait training, strength/ROM exercises, modalities for pain, and patient education.    Total Treatment Duration:  PT Patient Time In/Time Out  Time In: 1115  Time Out: [place], PT

## 2019-07-25 NOTE — PROGRESS NOTES
Patient in bed. Alert, oriented to person, disoriented to place-stated I thought I was at the [facility], disoriented to situation-did not understand that wife went home and semi-oriented to time (correct season, patient 2018 when asked about year and did not know month). Reoriented patient. Lungs clear to auscultation. Active bowel sounds to all 4 quadrants. Strong  to right hand, weak to left hand. Dressing dry ad intact. Instructed not to get up by themselves and call for assistance or any needs. Patient verbalized understanding. Bed alarm on. Call bell within reach. Side rails up x3. Bed low and locked. No distress noted.

## 2019-07-25 NOTE — PROGRESS NOTES
TRANSFER - OUT REPORT:    Verbal report given to [facility] on Larry Sanchez  being transferred to [facility] for routine progression of care       Report consisted of patients Situation, Background, Assessment and   Recommendations(SBAR). Information from the following report(s) SBAR, Kardex, OR Summary, Procedure Summary, Intake/Output, MAR, Accordion, Recent Results and Med Rec Status was reviewed with the receiving nurse. Lines:   Peripheral IV 07/22/19 Anterior;Right Forearm (Active)   Site Assessment Clean, dry, & intact 7/25/2019 12:55 PM   Phlebitis Assessment 0 7/25/2019 12:55 PM   Infiltration Assessment 0 7/25/2019 12:55 PM   Dressing Status Clean, dry, & intact 7/25/2019 12:55 PM   Dressing Type Tape;Transparent 7/25/2019 12:55 PM   Hub Color/Line Status Capped 7/25/2019 12:55 PM   Alcohol Cap Used No 7/25/2019  3:30 AM        Opportunity for questions and clarification was provided.       Patient transported with:   ambulance

## 2019-07-25 NOTE — PROGRESS NOTES
HCG full bath completed by PCT, PT and RN. Allevyn pad applied to sacrum. Zinc based cream applied to scrotum and perineum. Incontinent brief changed. Bed sheets changed. No distress noted.

## 2019-07-25 NOTE — DISCHARGE SUMMARY
Hospitalist Discharge Summary     Patient ID:  Anaya Josue  [id number]  37 y.o.  1950  Admit date: 7/22/2019 11:40 AM  Discharge date and time: 7/25/2019  Attending: Kevin Szymanski DO  PCP:  Carol Fletcher MD  Treatment Team: Attending Provider: Kevin Szymanski DO; Utilization Review: Juan Kim RN; Consulting Provider: Jeri Diallo MD; Consulting Provider: Kevin Szymanski DO; : Thai Castillo; Care Manager: DAYAN Tamez    Principal Diagnosis Hypovolemic shock [facility])   Principal Problem:    Hypovolemic shock ([facility] [address] (7/22/2019)    Active Problems:    CKD (chronic kidney disease) stage 3, GFR 30-59 ml/min ([facility] [address] (10/18/2010)      Morbid obesity ([address] (10/18/2010)      Essential hypertension, benign (9/15/2015)      Diabetes mellitus type 2, controlled ([facility] [address] (12/15/2015)      JASON (obstructive sleep apnea) (4/7/2016)      Closed displaced transverse fracture of shaft of left humerus (7/17/2019)      Closed displaced transverse fracture of shaft of right humerus (7/22/2019)      Hypotension (7/22/2019)      Sacral wound (7/22/2019)     Patient is a 68 y.o. male who presented to the ED for cc left arm pain. Patient found to have a closed displaced transverse shaft of the left humerus. S/p surgical repair of left humerus on 7/22/19. Patient lost at least 400cc while in OR and now BP low. Hx significant for anxiety, DM type II, gout, insomnia, monoclonal gammopathy of undetermined significance followed by Ananth aldridge, atrial flutter on coumadin, HLD, HTN, dementia, and CHF with EF 50% in 2016. Received phenylephrine downstairs. Interval History (7/25): patient examined at bedside. No acute events overnight. Patient will state his name, where he is at, and year but does not have insight into his medical condition; furthermore, patient needs constant reminder of what is going on right now as well as what was discussed several hours ago. Wife at bedside, states that this is completely different compared to several months ago. She has noticed that he has been \"reaching out and yelling out\" in his sleep and he has had difficulties with his balance leading to several falls. Hospital Course:  Please refer to the admission H&P for details of presentation. In summary, the patient is admitted for acute humeral fracture sustained due to recurrent falls. He has been having worsening altered mental status changes over the past several weeks/months, per wife at bedside. Patient underwent surgery with orthopedic surgery, his post-operative hospital course was complicated by suspected hypovolemic shock requiring both transfer to the ICU and transfusion of 2 units pRBCs. He also had worsening encephalopathy thought to be due to either polypharmacy and/or infectious. Sedating medications held. Urine culture growing >100,000 CFU Staph aureus, this may be related to CAUTI from Cardenas catheter insertion. He received 3 days of empiric Rocephin, susceptibilities are still pending. Due to continued confusion, blood cultures x2 ordered and pending and patient empirically switched to vancomycin to cover Staphylococcus while urine culture susceptibilities pending. Additional bloodwork ordered to further evaluate underlying encephalopathy (ammonia, vitamin B12, folate, thiamine, ammonia, RPR) the majority of which is still pending. Patient also with known sacral/bilateral buttock pressure wounds. Patient has been accepted for transfer to [facility] for [facility]. Discussed at detail with wife at bedside. Also case discussed with accepting physician, Dr. Reji Echeverria at [facility].      Significant Diagnostic Studies:       Labs: Results:       Chemistry Recent Labs     07/25/19  1148 07/25/19  0422 07/24/19  0117 07/23/19  0129   GLU  --  219* 253* 251*   NA  --  139 137 140   K  --  4.0 4.0 4.1   CL  --  103 103 101   CO2  --  32 29 29   BUN  --  41* 50* 59*   CREA  --  1.19 1. 48 1.90*   CA  --  8.2* 7.7* 8.2*   AGAP  --  4* 5* 10   *  --   --   --    TP 6.8  --   --   --    ALB 2.1*  --   --   --    GLOB 4.7*  --   --   --    AGRAT 0.4*  --   --   --       CBC w/Diff Recent Labs     07/25/19  0422 07/24/19  0634 07/24/19  0117  07/23/19  0129   WBC 13.2*  --  14.2*  --  17.8*   RBC 4.08*  --  4.11*  --  4.43   HGB 9.1* 9.7* 9.1*   < > 10.0*   HCT 31.1* 31.7* 30.3*   < > 32.8*     --  232  --  296    < > = values in this interval not displayed. Cardiac Enzymes No results for input(s): CPK, CKND1, CHAGO in the last 72 hours. No lab exists for component: CKRMB, TROIP   Coagulation Recent Labs     07/25/19  0422 07/24/19  0117   PTP 25.0* 25.0*   INR 2.2 2.2       Lipid Panel Lab Results   Component Value Date/Time    Cholesterol, total 109 09/28/2018 08:02 AM    HDL Cholesterol 23 (L) 09/28/2018 08:02 AM    LDL, calculated 29 09/28/2018 08:02 AM    VLDL, calculated 57 (H) 09/28/2018 08:02 AM    Triglyceride 285 (H) 09/28/2018 08:02 AM    CHOL/HDL Ratio 4.9 10/19/2010 04:44 AM      BNP No results for input(s): BNPP in the last 72 hours. Liver Enzymes Recent Labs     07/25/19  1148   TP 6.8   ALB 2.1*   *   SGOT 35      Thyroid Studies Lab Results   Component Value Date/Time    TSH 1.910 06/25/2019 11:11 AM            Discharge Exam:  Visit Vitals  /64 (BP 1 Location: Right arm, BP Patient Position: At rest)   Pulse 65   Temp 97.9 °F (36.6 °C)   Resp 18   Ht 6' (1.829 m)   Wt 151 kg (333 lb)   SpO2 98%   BMI 45.16 kg/m²     General:          Morbidly obese  Lungs:             CTA b/l without no R/R/W  Heart:              RRR, +S1, +S2  Abdomen:        Soft, Non distended, Non tender, Positive bowel sounds  Extremities:     L arm in sling  Neurologic:       Oriented to person, place, time but not situation.  No focal neurologic deficits  Skin:                Erythema and blanchable skin on bilateral buttocks    Disposition: long term care facility  Discharge Condition: stable  Patient Instructions:   Current Discharge Medication List      START taking these medications    Details   ferrous sulfate 325 mg (65 mg iron) tablet Take 1 Tab by mouth two (2) times daily (with meals) for 30 days. Qty: 60 Tab, Refills: 0         CONTINUE these medications which have NOT CHANGED    Details   albuterol-ipratropium (DUO-NEB) 2.5 mg-0.5 mg/3 ml nebu 3 mL by Nebulization route four (4) times daily. Oxygen Indications: 3 L via NC      HUMALOG U-100 INSULIN 100 unit/mL injection by SubCUTAneous route. Indications: sliding scale      simethicone (MYLICON) 80 mg chewable tablet Take 1 Tab by mouth four (4) times daily as needed for Flatulence. Qty: 12 Tab, Refills: 0      allopurinol (ZYLOPRIM) 100 mg tablet Take 100 mg by mouth daily. atorvastatin (LIPITOR) 40 mg tablet 1 every day for cholesterol (replaces simvastatin)  Qty: 90 Tab, Refills: 12      bumetanide (BUMEX) 1 mg tablet 1 to 2 every day for fluid  Qty: 60 Tab, Refills: 12    Associated Diagnoses: Chronic systolic heart failure ([facility] [address] Essential hypertension, benign      DULoxetine (CYMBALTA) 60 mg capsule Take 1 Cap by mouth daily. Indications: ANXIETY WITH DEPRESSION, NEUROPATHIC PAIN  Qty: 90 Cap, Refills: 12    Associated Diagnoses: Depression, unspecified depression type      aspirin 81 mg chewable tablet Take 1 Tab by mouth daily. Qty: 30 Tab, Refills: 0    Associated Diagnoses: Chronic systolic heart failure (HCC)      polyethylene glycol (MIRALAX) 17 gram packet Take 1 Packet by mouth daily. Qty: 1 Each, Refills: 3    Associated Diagnoses: Chronic systolic heart failure (HCC)      nitroglycerin (NITROSTAT) 0.4 mg SL tablet 1 Tab by SubLINGual route as needed for Chest Pain. Qty: 25 Bottle, Refills: 11      fluticasone (FLONASE) 50 mcg/actuation nasal spray 1 spray IEN every day to BID  Qty: 3 Bottle, Refills: 9      ammonium lactate (LAC-HYDRIN) 12 % lotion Apply  to affected area nightly.  rub in to affected area well BLE      !! warfarin (COUMADIN) 2.5 mg tablet Take 2.5 mg by mouth every Tuesday, Thursday, Saturday & Sunday. !! warfarin (COUMADIN) 5 mg tablet Take 5 mg by mouth every Monday, Wednesday, Friday. azelastine (ASTELIN) 137 mcg (0.1 %) nasal spray Use in each nostril bid  Qty: 3 Bottle, Refills: 9    Associated Diagnoses: Allergic rhinitis, unspecified seasonality, unspecified trigger      azelastine (OPTIVAR) 0.05 % ophthalmic solution Use in affected eye bid to tid for allergies  Qty: 6 mL, Refills: 12    Associated Diagnoses: Vernal conjunctivitis of both eyes      Insulin Syringe-Needle U-100 (BD INSULIN SYRINGE) 1 mL 28 gauge x 1/2\" syrg Use 5 x a day. Dx E11.65  Indications: 5x a day  Qty: 500 Syringe, Refills: 12    Associated Diagnoses: Controlled type 2 diabetes mellitus with diabetic polyneuropathy, with long-term current use of insulin ([facility])      glucose blood VI test strips (ONETOUCH ULTRA TEST) strip Use bid to tid   DX: E 11.65   Disp with lancets of formulary  Indications: dispense with lancets  Qty: 300 Strip, Refills: 12    Associated Diagnoses: Controlled type 2 diabetes mellitus with diabetic polyneuropathy, with long-term current use of insulin ([facility])      lancets (ONETOUCH DELICA LANCETS) 30 gauge misc Use up to tid   Dx E11.65  Qty: 300 Lancet, Refills: 12    Associated Diagnoses: Controlled type 2 diabetes mellitus with diabetic polyneuropathy, with long-term current use of insulin ([facility])      Blood-Glucose Meter (ONETOUCH ULTRA2) monitoring kit Use bid to tid   DX E11.65  If another product preferred on formulary please let me know which to select from. claudia  Qty: 1 Kit, Refills: 12    Associated Diagnoses: Controlled type 2 diabetes mellitus with diabetic polyneuropathy, with long-term current use of insulin ([facility])      hydrocortisone (ANUSOL-HC) 2.5 % rectal cream Insert  into rectum four (4) times daily.   Qty: 30 g, Refills: 0       !! - Potential duplicate medications found. Please discuss with provider. STOP taking these medications       LORazepam (ATIVAN) 1 mg tablet Comments:   Reason for Stopping:         traMADol (ULTRAM) 50 mg tablet Comments:   Reason for Stopping:         pregabalin (LYRICA) 300 mg capsule Comments:   Reason for Stopping:         zolpidem (AMBIEN) 10 mg tablet Comments:   Reason for Stopping:         potassium chloride (KLOR-CON) 10 mEq tablet Comments:   Reason for Stopping:         losartan (COZAAR) 100 mg tablet Comments:   Reason for Stopping:         fexofenadine-pseudoephedrine (ALLEGRA-D 24) 180-240 mg per tablet Comments:   Reason for Stopping:               Activity: As tolerated  Diet: Resume previous diet  Wound Care: As directed    Follow-up  ·   Transfer from [address] [place] [address]  Time spent to discharge patient 35 minutes  Signed:   Virgil Magana DO  7/25/2019  4:05 PM

## 2019-07-25 NOTE — PROGRESS NOTES
Lying quietly in bed,dsng D/I. NV status WDL. Sling in use L arm and shoulder. Pillow under shoulder and upper arm. Incontinent of urine,wearing adult brief. Brief changed,Zinc cream applied to perineal area. Assisted with repositioning,tolerated well. Oxycodone 10 mg po per pt request for c/o pain. Family member at bedside. Call light within reach.

## 2019-07-26 ENCOUNTER — PATIENT OUTREACH (OUTPATIENT)
Dept: CASE MANAGEMENT | Age: 69
End: 2019-07-26

## 2019-07-26 LAB
BACTERIA SPEC CULT: ABNORMAL
BACTERIA SPEC CULT: ABNORMAL
INR PPP: 2.7
PROTHROMBIN TIME: 28.1 SEC (ref 11.7–14.5)
RPR SER QL: NONREACTIVE
SERVICE CMNT-IMP: ABNORMAL
VANCOMYCIN TROUGH SERPL-MCNC: 4.8 UG/ML (ref 5–20)

## 2019-07-26 PROCEDURE — 85610 PROTHROMBIN TIME: CPT

## 2019-07-26 PROCEDURE — 36415 COLL VENOUS BLD VENIPUNCTURE: CPT

## 2019-07-26 PROCEDURE — 80202 ASSAY OF VANCOMYCIN: CPT

## 2019-07-26 NOTE — PROGRESS NOTES
This note will not be viewable in [address] [facility] outreach postponed for 30 days due to discharge to [facility].

## 2019-07-27 LAB
ALBUMIN SERPL-MCNC: 1.9 G/DL (ref 3.2–4.6)
ALBUMIN/GLOB SERPL: 0.4 {RATIO} (ref 1.2–3.5)
ALP SERPL-CCNC: 282 U/L (ref 50–136)
ALT SERPL-CCNC: 28 U/L (ref 12–65)
ANION GAP SERPL CALC-SCNC: 7 MMOL/L (ref 7–16)
AST SERPL-CCNC: 40 U/L (ref 15–37)
BASOPHILS # BLD: 0.1 K/UL (ref 0–0.2)
BASOPHILS NFR BLD: 1 % (ref 0–2)
BILIRUB SERPL-MCNC: 0.8 MG/DL (ref 0.2–1.1)
BUN SERPL-MCNC: 31 MG/DL (ref 8–23)
CALCIUM SERPL-MCNC: 8.1 MG/DL (ref 8.3–10.4)
CHLORIDE SERPL-SCNC: 102 MMOL/L (ref 98–107)
CO2 SERPL-SCNC: 31 MMOL/L (ref 21–32)
CREAT SERPL-MCNC: 0.83 MG/DL (ref 0.8–1.5)
DIFFERENTIAL METHOD BLD: ABNORMAL
EOSINOPHIL # BLD: 0.2 K/UL (ref 0–0.8)
EOSINOPHIL NFR BLD: 2 % (ref 0.5–7.8)
ERYTHROCYTE [DISTWIDTH] IN BLOOD BY AUTOMATED COUNT: 23.5 % (ref 11.9–14.6)
GLOBULIN SER CALC-MCNC: 4.5 G/DL (ref 2.3–3.5)
GLUCOSE SERPL-MCNC: 182 MG/DL (ref 65–100)
HCT VFR BLD AUTO: 30.4 % (ref 41.1–50.3)
HGB BLD-MCNC: 9.1 G/DL (ref 13.6–17.2)
IMM GRANULOCYTES # BLD AUTO: 0.1 K/UL (ref 0–0.5)
IMM GRANULOCYTES NFR BLD AUTO: 1 % (ref 0–5)
LYMPHOCYTES # BLD: 1.1 K/UL (ref 0.5–4.6)
LYMPHOCYTES NFR BLD: 11 % (ref 13–44)
MAGNESIUM SERPL-MCNC: 1.4 MG/DL (ref 1.8–2.4)
MCH RBC QN AUTO: 22.4 PG (ref 26.1–32.9)
MCHC RBC AUTO-ENTMCNC: 29.9 G/DL (ref 31.4–35)
MCV RBC AUTO: 74.7 FL (ref 79.6–97.8)
MONOCYTES # BLD: 0.7 K/UL (ref 0.1–1.3)
MONOCYTES NFR BLD: 7 % (ref 4–12)
NEUTS SEG # BLD: 8.4 K/UL (ref 1.7–8.2)
NEUTS SEG NFR BLD: 79 % (ref 43–78)
NRBC # BLD: 0 K/UL (ref 0–0.2)
PLATELET # BLD AUTO: 299 K/UL (ref 150–450)
PMV BLD AUTO: 11.1 FL (ref 9.4–12.3)
POTASSIUM SERPL-SCNC: 3.4 MMOL/L (ref 3.5–5.1)
PROT SERPL-MCNC: 6.4 G/DL (ref 6.3–8.2)
RBC # BLD AUTO: 4.07 M/UL (ref 4.23–5.6)
SODIUM SERPL-SCNC: 140 MMOL/L (ref 136–145)
WBC # BLD AUTO: 10.6 K/UL (ref 4.3–11.1)

## 2019-07-27 PROCEDURE — 83735 ASSAY OF MAGNESIUM: CPT

## 2019-07-27 PROCEDURE — 36415 COLL VENOUS BLD VENIPUNCTURE: CPT

## 2019-07-27 PROCEDURE — 80053 COMPREHEN METABOLIC PANEL: CPT

## 2019-07-27 PROCEDURE — 85025 COMPLETE CBC W/AUTO DIFF WBC: CPT

## 2019-07-28 LAB
POTASSIUM SERPL-SCNC: 3.1 MMOL/L (ref 3.5–5.1)
VIT B1 BLD-SCNC: 117.2 NMOL/L (ref 66.5–200)

## 2019-07-28 PROCEDURE — 84132 ASSAY OF SERUM POTASSIUM: CPT

## 2019-07-28 PROCEDURE — 36415 COLL VENOUS BLD VENIPUNCTURE: CPT

## 2019-07-29 LAB
ALBUMIN SERPL-MCNC: 2.1 G/DL (ref 3.2–4.6)
ALBUMIN/GLOB SERPL: 0.5 {RATIO} (ref 1.2–3.5)
ALP SERPL-CCNC: 298 U/L (ref 50–136)
ALT SERPL-CCNC: 31 U/L (ref 12–65)
ANION GAP SERPL CALC-SCNC: 8 MMOL/L (ref 7–16)
AST SERPL-CCNC: 30 U/L (ref 15–37)
BASOPHILS # BLD: 0.1 K/UL (ref 0–0.2)
BASOPHILS NFR BLD: 1 % (ref 0–2)
BILIRUB SERPL-MCNC: 0.8 MG/DL (ref 0.2–1.1)
BUN SERPL-MCNC: 22 MG/DL (ref 8–23)
CALCIUM SERPL-MCNC: 8.4 MG/DL (ref 8.3–10.4)
CHLORIDE SERPL-SCNC: 102 MMOL/L (ref 98–107)
CO2 SERPL-SCNC: 30 MMOL/L (ref 21–32)
CREAT SERPL-MCNC: 0.98 MG/DL (ref 0.8–1.5)
DIFFERENTIAL METHOD BLD: ABNORMAL
EOSINOPHIL # BLD: 0.4 K/UL (ref 0–0.8)
EOSINOPHIL NFR BLD: 2 % (ref 0.5–7.8)
ERYTHROCYTE [DISTWIDTH] IN BLOOD BY AUTOMATED COUNT: 24.2 % (ref 11.9–14.6)
GLOBULIN SER CALC-MCNC: 4.6 G/DL (ref 2.3–3.5)
GLUCOSE SERPL-MCNC: 166 MG/DL (ref 65–100)
HCT VFR BLD AUTO: 32.3 % (ref 41.1–50.3)
HGB BLD-MCNC: 9.7 G/DL (ref 13.6–17.2)
IMM GRANULOCYTES # BLD AUTO: 0.5 K/UL (ref 0–0.5)
IMM GRANULOCYTES NFR BLD AUTO: 3 % (ref 0–5)
INR PPP: 2.1
LYMPHOCYTES # BLD: 1.6 K/UL (ref 0.5–4.6)
LYMPHOCYTES NFR BLD: 10 % (ref 13–44)
MAGNESIUM SERPL-MCNC: 1.7 MG/DL (ref 1.8–2.4)
MCH RBC QN AUTO: 22.4 PG (ref 26.1–32.9)
MCHC RBC AUTO-ENTMCNC: 30 G/DL (ref 31.4–35)
MCV RBC AUTO: 74.6 FL (ref 79.6–97.8)
MONOCYTES # BLD: 1 K/UL (ref 0.1–1.3)
MONOCYTES NFR BLD: 6 % (ref 4–12)
NEUTS SEG # BLD: 12.9 K/UL (ref 1.7–8.2)
NEUTS SEG NFR BLD: 78 % (ref 43–78)
NRBC # BLD: 0.03 K/UL (ref 0–0.2)
PLATELET # BLD AUTO: 389 K/UL (ref 150–450)
PMV BLD AUTO: 10.8 FL (ref 9.4–12.3)
POTASSIUM SERPL-SCNC: 4 MMOL/L (ref 3.5–5.1)
PROT SERPL-MCNC: 6.7 G/DL (ref 6.3–8.2)
PROTHROMBIN TIME: 23 SEC (ref 11.7–14.5)
RBC # BLD AUTO: 4.33 M/UL (ref 4.23–5.6)
SODIUM SERPL-SCNC: 140 MMOL/L (ref 136–145)
VANCOMYCIN TROUGH SERPL-MCNC: 38.4 UG/ML (ref 5–20)
WBC # BLD AUTO: 16.5 K/UL (ref 4.3–11.1)

## 2019-07-29 PROCEDURE — 83735 ASSAY OF MAGNESIUM: CPT

## 2019-07-29 PROCEDURE — 85025 COMPLETE CBC W/AUTO DIFF WBC: CPT

## 2019-07-29 PROCEDURE — 80053 COMPREHEN METABOLIC PANEL: CPT

## 2019-07-29 PROCEDURE — 80202 ASSAY OF VANCOMYCIN: CPT

## 2019-07-29 PROCEDURE — 85610 PROTHROMBIN TIME: CPT

## 2019-07-29 PROCEDURE — 36415 COLL VENOUS BLD VENIPUNCTURE: CPT

## 2019-07-30 LAB
MAGNESIUM SERPL-MCNC: 1.8 MG/DL (ref 1.8–2.4)
VANCOMYCIN SERPL-MCNC: 18.6 UG/ML

## 2019-07-30 PROCEDURE — 80202 ASSAY OF VANCOMYCIN: CPT

## 2019-07-30 PROCEDURE — 83735 ASSAY OF MAGNESIUM: CPT

## 2019-07-30 PROCEDURE — 36415 COLL VENOUS BLD VENIPUNCTURE: CPT

## 2019-07-31 LAB
BACTERIA SPEC CULT: NORMAL
BACTERIA SPEC CULT: NORMAL
INR PPP: 1.9
PROTHROMBIN TIME: 21.3 SEC (ref 11.7–14.5)
SERVICE CMNT-IMP: NORMAL
SERVICE CMNT-IMP: NORMAL

## 2019-07-31 PROCEDURE — 85610 PROTHROMBIN TIME: CPT

## 2019-07-31 PROCEDURE — 36415 COLL VENOUS BLD VENIPUNCTURE: CPT

## 2019-08-02 ENCOUNTER — APPOINTMENT (OUTPATIENT)
Dept: GENERAL RADIOLOGY | Age: 69
End: 2019-08-02
Attending: INTERNAL MEDICINE

## 2019-08-02 ENCOUNTER — HOSPITAL ENCOUNTER (OUTPATIENT)
Dept: LAB | Age: 69
Discharge: HOME | End: 2019-08-02
Attending: ORTHOPAEDIC SURGERY

## 2019-08-02 LAB
ANION GAP SERPL CALC-SCNC: 10 MMOL/L (ref 7–16)
ANION GAP SERPL CALC-SCNC: 5 MMOL/L (ref 7–16)
BASOPHILS # BLD: 0.2 K/UL (ref 0–0.2)
BASOPHILS NFR BLD: 1 % (ref 0–2)
BUN SERPL-MCNC: 17 MG/DL (ref 8–23)
BUN SERPL-MCNC: 18 MG/DL (ref 8–23)
CALCIUM SERPL-MCNC: 7.9 MG/DL (ref 8.3–10.4)
CALCIUM SERPL-MCNC: 8.3 MG/DL (ref 8.3–10.4)
CHLORIDE SERPL-SCNC: 100 MMOL/L (ref 98–107)
CHLORIDE SERPL-SCNC: 99 MMOL/L (ref 98–107)
CK MB CFR SERPL CALC: NORMAL %
CK MB CFR SERPL CALC: NORMAL %
CK MB SERPL-MCNC: <1 NG/ML (ref 0.5–3.6)
CK MB SERPL-MCNC: <1 NG/ML (ref 0.5–3.6)
CK SERPL-CCNC: 145 U/L (ref 21–215)
CK SERPL-CCNC: 29 U/L (ref 21–215)
CO2 SERPL-SCNC: 25 MMOL/L (ref 21–32)
CO2 SERPL-SCNC: 32 MMOL/L (ref 21–32)
CREAT SERPL-MCNC: 1.04 MG/DL (ref 0.8–1.5)
CREAT SERPL-MCNC: 1.19 MG/DL (ref 0.8–1.5)
DIFFERENTIAL METHOD BLD: ABNORMAL
EOSINOPHIL # BLD: 0.3 K/UL (ref 0–0.8)
EOSINOPHIL NFR BLD: 1 % (ref 0.5–7.8)
ERYTHROCYTE [DISTWIDTH] IN BLOOD BY AUTOMATED COUNT: 26.4 % (ref 11.9–14.6)
GLUCOSE SERPL-MCNC: 197 MG/DL (ref 65–100)
GLUCOSE SERPL-MCNC: 238 MG/DL (ref 65–100)
HCT VFR BLD AUTO: 36.6 % (ref 41.1–50.3)
HGB BLD-MCNC: 10.9 G/DL (ref 13.6–17.2)
IMM GRANULOCYTES # BLD AUTO: 1 K/UL (ref 0–0.5)
IMM GRANULOCYTES NFR BLD AUTO: 4 % (ref 0–5)
INR PPP: 1.9
LACTATE SERPL-SCNC: 4.6 MMOL/L (ref 0.4–2)
LYMPHOCYTES # BLD: 4.1 K/UL (ref 0.5–4.6)
LYMPHOCYTES NFR BLD: 17 % (ref 13–44)
MAGNESIUM SERPL-MCNC: 1.8 MG/DL (ref 1.8–2.4)
MAGNESIUM SERPL-MCNC: 1.8 MG/DL (ref 1.8–2.4)
MCH RBC QN AUTO: 23.1 PG (ref 26.1–32.9)
MCHC RBC AUTO-ENTMCNC: 29.8 G/DL (ref 31.4–35)
MCV RBC AUTO: 77.7 FL (ref 79.6–97.8)
MONOCYTES # BLD: 1.9 K/UL (ref 0.1–1.3)
MONOCYTES NFR BLD: 8 % (ref 4–12)
NEUTS SEG # BLD: 16.1 K/UL (ref 1.7–8.2)
NEUTS SEG NFR BLD: 69 % (ref 43–78)
NRBC # BLD: 0.11 K/UL (ref 0–0.2)
PLATELET # BLD AUTO: 491 K/UL (ref 150–450)
PMV BLD AUTO: 10.3 FL (ref 9.4–12.3)
POTASSIUM SERPL-SCNC: 3.3 MMOL/L (ref 3.5–5.1)
POTASSIUM SERPL-SCNC: 3.9 MMOL/L (ref 3.5–5.1)
POTASSIUM SERPL-SCNC: 4.6 MMOL/L (ref 3.5–5.1)
PROCALCITONIN SERPL-MCNC: 0.2 NG/ML
PROTHROMBIN TIME: 21.7 SEC (ref 11.7–14.5)
RBC # BLD AUTO: 4.71 M/UL (ref 4.23–5.6)
SODIUM SERPL-SCNC: 135 MMOL/L (ref 136–145)
SODIUM SERPL-SCNC: 136 MMOL/L (ref 136–145)
TROPONIN I SERPL-MCNC: 0.05 NG/ML (ref 0.02–0.05)
TROPONIN I SERPL-MCNC: <0.02 NG/ML (ref 0.02–0.05)
TROPONIN I SERPL-MCNC: <0.02 NG/ML (ref 0.02–0.05)
WBC # BLD AUTO: 23.6 K/UL (ref 4.3–11.1)

## 2019-08-02 PROCEDURE — 36415 COLL VENOUS BLD VENIPUNCTURE: CPT

## 2019-08-02 PROCEDURE — 85610 PROTHROMBIN TIME: CPT

## 2019-08-02 PROCEDURE — 84484 ASSAY OF TROPONIN QUANT: CPT

## 2019-08-02 PROCEDURE — 85025 COMPLETE CBC W/AUTO DIFF WBC: CPT

## 2019-08-02 PROCEDURE — 83735 ASSAY OF MAGNESIUM: CPT

## 2019-08-02 PROCEDURE — 87040 BLOOD CULTURE FOR BACTERIA: CPT

## 2019-08-02 PROCEDURE — 84132 ASSAY OF SERUM POTASSIUM: CPT

## 2019-08-02 PROCEDURE — 82550 ASSAY OF CK (CPK): CPT

## 2019-08-02 PROCEDURE — 83605 ASSAY OF LACTIC ACID: CPT

## 2019-08-02 PROCEDURE — 84145 PROCALCITONIN (PCT): CPT

## 2019-08-02 PROCEDURE — 71045 X-RAY EXAM CHEST 1 VIEW: CPT

## 2019-08-02 PROCEDURE — 80048 BASIC METABOLIC PNL TOTAL CA: CPT

## 2019-08-02 PROCEDURE — 82553 CREATINE MB FRACTION: CPT

## 2019-08-03 LAB
ANION GAP SERPL CALC-SCNC: 7 MMOL/L (ref 7–16)
BASOPHILS # BLD: 0.1 K/UL (ref 0–0.2)
BASOPHILS NFR BLD: 1 % (ref 0–2)
BNP SERPL-MCNC: 1390 PG/ML
BUN SERPL-MCNC: 23 MG/DL (ref 8–23)
CALCIUM SERPL-MCNC: 8.1 MG/DL (ref 8.3–10.4)
CHLORIDE SERPL-SCNC: 99 MMOL/L (ref 98–107)
CO2 SERPL-SCNC: 29 MMOL/L (ref 21–32)
CREAT SERPL-MCNC: 1.29 MG/DL (ref 0.8–1.5)
DIFFERENTIAL METHOD BLD: ABNORMAL
EOSINOPHIL # BLD: 0.2 K/UL (ref 0–0.8)
EOSINOPHIL NFR BLD: 1 % (ref 0.5–7.8)
ERYTHROCYTE [DISTWIDTH] IN BLOOD BY AUTOMATED COUNT: 25.8 % (ref 11.9–14.6)
GLUCOSE SERPL-MCNC: 245 MG/DL (ref 65–100)
HCT VFR BLD AUTO: 33.4 % (ref 41.1–50.3)
HGB BLD-MCNC: 10.1 G/DL (ref 13.6–17.2)
IMM GRANULOCYTES # BLD AUTO: 0.2 K/UL (ref 0–0.5)
IMM GRANULOCYTES NFR BLD AUTO: 1 % (ref 0–5)
LYMPHOCYTES # BLD: 1.4 K/UL (ref 0.5–4.6)
LYMPHOCYTES NFR BLD: 8 % (ref 13–44)
MAGNESIUM SERPL-MCNC: 2.3 MG/DL (ref 1.8–2.4)
MCH RBC QN AUTO: 23.2 PG (ref 26.1–32.9)
MCHC RBC AUTO-ENTMCNC: 30.2 G/DL (ref 31.4–35)
MCV RBC AUTO: 76.6 FL (ref 79.6–97.8)
MONOCYTES # BLD: 1.3 K/UL (ref 0.1–1.3)
MONOCYTES NFR BLD: 7 % (ref 4–12)
NEUTS SEG # BLD: 15.2 K/UL (ref 1.7–8.2)
NEUTS SEG NFR BLD: 83 % (ref 43–78)
NRBC # BLD: 0 K/UL (ref 0–0.2)
PLATELET # BLD AUTO: 523 K/UL (ref 150–450)
PMV BLD AUTO: 10.5 FL (ref 9.4–12.3)
POTASSIUM SERPL-SCNC: 3.7 MMOL/L (ref 3.5–5.1)
RBC # BLD AUTO: 4.36 M/UL (ref 4.23–5.6)
SODIUM SERPL-SCNC: 135 MMOL/L (ref 136–145)
TROPONIN I SERPL-MCNC: 0.11 NG/ML (ref 0.02–0.05)
TROPONIN I SERPL-MCNC: 0.13 NG/ML (ref 0.02–0.05)
WBC # BLD AUTO: 18.3 K/UL (ref 4.3–11.1)

## 2019-08-03 PROCEDURE — 85025 COMPLETE CBC W/AUTO DIFF WBC: CPT

## 2019-08-03 PROCEDURE — 80048 BASIC METABOLIC PNL TOTAL CA: CPT

## 2019-08-03 PROCEDURE — 83880 ASSAY OF NATRIURETIC PEPTIDE: CPT

## 2019-08-03 PROCEDURE — 84484 ASSAY OF TROPONIN QUANT: CPT

## 2019-08-03 PROCEDURE — 36415 COLL VENOUS BLD VENIPUNCTURE: CPT

## 2019-08-03 PROCEDURE — 83735 ASSAY OF MAGNESIUM: CPT

## 2019-08-04 LAB
ANION GAP SERPL CALC-SCNC: 7 MMOL/L (ref 7–16)
BNP SERPL-MCNC: 914 PG/ML
BUN SERPL-MCNC: 28 MG/DL (ref 8–23)
CALCIUM SERPL-MCNC: 8.1 MG/DL (ref 8.3–10.4)
CHLORIDE SERPL-SCNC: 100 MMOL/L (ref 98–107)
CO2 SERPL-SCNC: 30 MMOL/L (ref 21–32)
CREAT SERPL-MCNC: 1.28 MG/DL (ref 0.8–1.5)
GLUCOSE SERPL-MCNC: 191 MG/DL (ref 65–100)
MAGNESIUM SERPL-MCNC: 2.1 MG/DL (ref 1.8–2.4)
POTASSIUM SERPL-SCNC: 3.4 MMOL/L (ref 3.5–5.1)
SODIUM SERPL-SCNC: 137 MMOL/L (ref 136–145)

## 2019-08-04 PROCEDURE — 74011250636 HC RX REV CODE- 250/636: Performed by: INTERNAL MEDICINE

## 2019-08-04 PROCEDURE — 74011000250 HC RX REV CODE- 250: Performed by: INTERNAL MEDICINE

## 2019-08-04 PROCEDURE — 36415 COLL VENOUS BLD VENIPUNCTURE: CPT

## 2019-08-04 PROCEDURE — C8929 TTE W OR WO FOL WCON,DOPPLER: HCPCS

## 2019-08-04 PROCEDURE — 80048 BASIC METABOLIC PNL TOTAL CA: CPT

## 2019-08-04 PROCEDURE — 83880 ASSAY OF NATRIURETIC PEPTIDE: CPT

## 2019-08-04 PROCEDURE — 83735 ASSAY OF MAGNESIUM: CPT

## 2019-08-04 RX ADMIN — PERFLUTREN 1 ML: 6.52 INJECTION, SUSPENSION INTRAVENOUS at 13:15

## 2019-08-05 ENCOUNTER — HOSPITAL ENCOUNTER (OUTPATIENT)
Dept: LAB | Age: 69
Discharge: HOME | End: 2019-08-05
Attending: ORTHOPAEDIC SURGERY

## 2019-08-05 ENCOUNTER — APPOINTMENT (OUTPATIENT)
Dept: GENERAL RADIOLOGY | Age: 69
End: 2019-08-05
Attending: INTERNAL MEDICINE

## 2019-08-05 LAB
ALBUMIN SERPL-MCNC: 2.1 G/DL (ref 3.2–4.6)
ALBUMIN SERPL-MCNC: 2.3 G/DL (ref 3.2–4.6)
ALBUMIN/GLOB SERPL: 0.4 {RATIO} (ref 1.2–3.5)
ALBUMIN/GLOB SERPL: 0.5 {RATIO} (ref 1.2–3.5)
ALP SERPL-CCNC: 228 U/L (ref 50–136)
ALP SERPL-CCNC: 232 U/L (ref 50–136)
ALT SERPL-CCNC: 18 U/L (ref 12–65)
ALT SERPL-CCNC: 20 U/L (ref 12–65)
ANION GAP SERPL CALC-SCNC: 7 MMOL/L (ref 7–16)
ANION GAP SERPL CALC-SCNC: 9 MMOL/L (ref 7–16)
AST SERPL-CCNC: 14 U/L (ref 15–37)
AST SERPL-CCNC: 70 U/L (ref 15–37)
BASOPHILS # BLD: 0.1 K/UL (ref 0–0.2)
BASOPHILS # BLD: 0.1 K/UL (ref 0–0.2)
BASOPHILS NFR BLD: 1 % (ref 0–2)
BASOPHILS NFR BLD: 1 % (ref 0–2)
BILIRUB SERPL-MCNC: 0.7 MG/DL (ref 0.2–1.1)
BILIRUB SERPL-MCNC: 0.7 MG/DL (ref 0.2–1.1)
BUN SERPL-MCNC: 23 MG/DL (ref 8–23)
BUN SERPL-MCNC: 25 MG/DL (ref 8–23)
CALCIUM SERPL-MCNC: 8 MG/DL (ref 8.3–10.4)
CALCIUM SERPL-MCNC: 8.1 MG/DL (ref 8.3–10.4)
CHLORIDE SERPL-SCNC: 101 MMOL/L (ref 98–107)
CHLORIDE SERPL-SCNC: 101 MMOL/L (ref 98–107)
CK SERPL-CCNC: 195 U/L (ref 21–215)
CO2 SERPL-SCNC: 27 MMOL/L (ref 21–32)
CO2 SERPL-SCNC: 29 MMOL/L (ref 21–32)
CREAT SERPL-MCNC: 1.09 MG/DL (ref 0.8–1.5)
CREAT SERPL-MCNC: 1.29 MG/DL (ref 0.8–1.5)
DIFFERENTIAL METHOD BLD: ABNORMAL
DIFFERENTIAL METHOD BLD: ABNORMAL
EOSINOPHIL # BLD: 0.2 K/UL (ref 0–0.8)
EOSINOPHIL # BLD: 0.3 K/UL (ref 0–0.8)
EOSINOPHIL NFR BLD: 1 % (ref 0.5–7.8)
EOSINOPHIL NFR BLD: 1 % (ref 0.5–7.8)
ERYTHROCYTE [DISTWIDTH] IN BLOOD BY AUTOMATED COUNT: 25.9 % (ref 11.9–14.6)
ERYTHROCYTE [DISTWIDTH] IN BLOOD BY AUTOMATED COUNT: 26.1 % (ref 11.9–14.6)
GLOBULIN SER CALC-MCNC: 4.7 G/DL (ref 2.3–3.5)
GLOBULIN SER CALC-MCNC: 5.1 G/DL (ref 2.3–3.5)
GLUCOSE SERPL-MCNC: 214 MG/DL (ref 65–100)
GLUCOSE SERPL-MCNC: 255 MG/DL (ref 65–100)
HCT VFR BLD AUTO: 32.8 % (ref 41.1–50.3)
HCT VFR BLD AUTO: 32.8 % (ref 41.1–50.3)
HGB BLD-MCNC: 9.9 G/DL (ref 13.6–17.2)
HGB BLD-MCNC: 9.9 G/DL (ref 13.6–17.2)
IMM GRANULOCYTES # BLD AUTO: 0.1 K/UL (ref 0–0.5)
IMM GRANULOCYTES # BLD AUTO: 0.4 K/UL (ref 0–0.5)
IMM GRANULOCYTES NFR BLD AUTO: 1 % (ref 0–5)
IMM GRANULOCYTES NFR BLD AUTO: 2 % (ref 0–5)
INR PPP: 2.4
INR PPP: 2.5
LYMPHOCYTES # BLD: 1.2 K/UL (ref 0.5–4.6)
LYMPHOCYTES # BLD: 3.2 K/UL (ref 0.5–4.6)
LYMPHOCYTES NFR BLD: 14 % (ref 13–44)
LYMPHOCYTES NFR BLD: 7 % (ref 13–44)
MAGNESIUM SERPL-MCNC: 1.9 MG/DL (ref 1.8–2.4)
MAGNESIUM SERPL-MCNC: 1.9 MG/DL (ref 1.8–2.4)
MCH RBC QN AUTO: 23.4 PG (ref 26.1–32.9)
MCH RBC QN AUTO: 23.5 PG (ref 26.1–32.9)
MCHC RBC AUTO-ENTMCNC: 30.2 G/DL (ref 31.4–35)
MCHC RBC AUTO-ENTMCNC: 30.2 G/DL (ref 31.4–35)
MCV RBC AUTO: 77.5 FL (ref 79.6–97.8)
MCV RBC AUTO: 77.9 FL (ref 79.6–97.8)
MONOCYTES # BLD: 1.2 K/UL (ref 0.1–1.3)
MONOCYTES # BLD: 1.7 K/UL (ref 0.1–1.3)
MONOCYTES NFR BLD: 7 % (ref 4–12)
MONOCYTES NFR BLD: 7 % (ref 4–12)
NEUTS SEG # BLD: 14.1 K/UL (ref 1.7–8.2)
NEUTS SEG # BLD: 17.5 K/UL (ref 1.7–8.2)
NEUTS SEG NFR BLD: 75 % (ref 43–78)
NEUTS SEG NFR BLD: 83 % (ref 43–78)
NRBC # BLD: 0 K/UL (ref 0–0.2)
NRBC # BLD: 0 K/UL (ref 0–0.2)
PHOSPHATE SERPL-MCNC: 3.9 MG/DL (ref 2.3–3.7)
PLATELET # BLD AUTO: 421 K/UL (ref 150–450)
PLATELET # BLD AUTO: 470 K/UL (ref 150–450)
PMV BLD AUTO: 10.1 FL (ref 9.4–12.3)
PMV BLD AUTO: 10.6 FL (ref 9.4–12.3)
POTASSIUM SERPL-SCNC: 3.8 MMOL/L (ref 3.5–5.1)
POTASSIUM SERPL-SCNC: 4.9 MMOL/L (ref 3.5–5.1)
PROT SERPL-MCNC: 7 G/DL (ref 6.3–8.2)
PROT SERPL-MCNC: 7.2 G/DL (ref 6.3–8.2)
PROTHROMBIN TIME: 25.7 SEC (ref 11.7–14.5)
PROTHROMBIN TIME: 26.4 SEC (ref 11.7–14.5)
RBC # BLD AUTO: 4.21 M/UL (ref 4.23–5.6)
RBC # BLD AUTO: 4.23 M/UL (ref 4.23–5.6)
SODIUM SERPL-SCNC: 137 MMOL/L (ref 136–145)
SODIUM SERPL-SCNC: 137 MMOL/L (ref 136–145)
TROPONIN I SERPL-MCNC: <0.02 NG/ML (ref 0.02–0.05)
WBC # BLD AUTO: 16.9 K/UL (ref 4.3–11.1)
WBC # BLD AUTO: 23.1 K/UL (ref 4.3–11.1)

## 2019-08-05 PROCEDURE — 71045 X-RAY EXAM CHEST 1 VIEW: CPT

## 2019-08-05 PROCEDURE — 85610 PROTHROMBIN TIME: CPT

## 2019-08-05 PROCEDURE — 36415 COLL VENOUS BLD VENIPUNCTURE: CPT

## 2019-08-05 PROCEDURE — 80053 COMPREHEN METABOLIC PANEL: CPT

## 2019-08-05 PROCEDURE — 85025 COMPLETE CBC W/AUTO DIFF WBC: CPT

## 2019-08-05 PROCEDURE — 83735 ASSAY OF MAGNESIUM: CPT

## 2019-08-05 PROCEDURE — 84484 ASSAY OF TROPONIN QUANT: CPT

## 2019-08-05 PROCEDURE — 84100 ASSAY OF PHOSPHORUS: CPT

## 2019-08-05 PROCEDURE — 82550 ASSAY OF CK (CPK): CPT

## 2019-08-06 LAB
ALBUMIN SERPL-MCNC: 1.9 G/DL (ref 3.2–4.6)
ALBUMIN/GLOB SERPL: 0.4 {RATIO} (ref 1.2–3.5)
ALP SERPL-CCNC: 209 U/L (ref 50–136)
ALT SERPL-CCNC: 17 U/L (ref 12–65)
AMORPH CRY URNS QL MICRO: ABNORMAL
ANION GAP SERPL CALC-SCNC: 8 MMOL/L (ref 7–16)
APPEARANCE UR: ABNORMAL
AST SERPL-CCNC: 48 U/L (ref 15–37)
BACTERIA URNS QL MICRO: ABNORMAL /HPF
BASOPHILS # BLD: 0.1 K/UL (ref 0–0.2)
BASOPHILS NFR BLD: 1 % (ref 0–2)
BILIRUB SERPL-MCNC: 0.6 MG/DL (ref 0.2–1.1)
BILIRUB UR QL: ABNORMAL
BUN SERPL-MCNC: 26 MG/DL (ref 8–23)
CALCIUM SERPL-MCNC: 8.5 MG/DL (ref 8.3–10.4)
CHLORIDE SERPL-SCNC: 103 MMOL/L (ref 98–107)
CO2 SERPL-SCNC: 28 MMOL/L (ref 21–32)
COLOR UR: YELLOW
CREAT SERPL-MCNC: 1.48 MG/DL (ref 0.8–1.5)
DIFFERENTIAL METHOD BLD: ABNORMAL
EOSINOPHIL # BLD: 0.1 K/UL (ref 0–0.8)
EOSINOPHIL NFR BLD: 1 % (ref 0.5–7.8)
ERYTHROCYTE [DISTWIDTH] IN BLOOD BY AUTOMATED COUNT: 25.9 % (ref 11.9–14.6)
GLOBULIN SER CALC-MCNC: 4.5 G/DL (ref 2.3–3.5)
GLUCOSE SERPL-MCNC: 160 MG/DL (ref 65–100)
GLUCOSE UR STRIP.AUTO-MCNC: NEGATIVE MG/DL
HCT VFR BLD AUTO: 33.4 % (ref 41.1–50.3)
HGB BLD-MCNC: 9.8 G/DL (ref 13.6–17.2)
HGB UR QL STRIP: ABNORMAL
IMM GRANULOCYTES # BLD AUTO: 0.1 K/UL (ref 0–0.5)
IMM GRANULOCYTES NFR BLD AUTO: 1 % (ref 0–5)
KETONES UR QL STRIP.AUTO: NEGATIVE MG/DL
LACTATE SERPL-SCNC: 2 MMOL/L (ref 0.4–2)
LEUKOCYTE ESTERASE UR QL STRIP.AUTO: ABNORMAL
LYMPHOCYTES # BLD: 1.5 K/UL (ref 0.5–4.6)
LYMPHOCYTES NFR BLD: 7 % (ref 13–44)
MAGNESIUM SERPL-MCNC: 2 MG/DL (ref 1.8–2.4)
MCH RBC QN AUTO: 23.1 PG (ref 26.1–32.9)
MCHC RBC AUTO-ENTMCNC: 29.3 G/DL (ref 31.4–35)
MCV RBC AUTO: 78.8 FL (ref 79.6–97.8)
MONOCYTES # BLD: 1.6 K/UL (ref 0.1–1.3)
MONOCYTES NFR BLD: 8 % (ref 4–12)
NEUTS SEG # BLD: 18 K/UL (ref 1.7–8.2)
NEUTS SEG NFR BLD: 84 % (ref 43–78)
NITRITE UR QL STRIP.AUTO: NEGATIVE
NRBC # BLD: 0 K/UL (ref 0–0.2)
OTHER OBSERVATIONS,UCOM: ABNORMAL
PH UR STRIP: 5 [PH] (ref 5–9)
PLATELET # BLD AUTO: 462 K/UL (ref 150–450)
PMV BLD AUTO: 10.3 FL (ref 9.4–12.3)
POTASSIUM SERPL-SCNC: 4.2 MMOL/L (ref 3.5–5.1)
PROCALCITONIN SERPL-MCNC: 0.4 NG/ML
PROT SERPL-MCNC: 6.4 G/DL (ref 6.3–8.2)
PROT UR STRIP-MCNC: 100 MG/DL
RBC # BLD AUTO: 4.24 M/UL (ref 4.23–5.6)
RBC #/AREA URNS HPF: ABNORMAL /HPF
SODIUM SERPL-SCNC: 139 MMOL/L (ref 136–145)
SP GR UR REFRACTOMETRY: >1.03 (ref 1–1.02)
UROBILINOGEN UR QL STRIP.AUTO: 0.2 EU/DL (ref 0.2–1)
WBC # BLD AUTO: 21.5 K/UL (ref 4.3–11.1)
WBC URNS QL MICRO: >100 /HPF
YEAST URNS QL MICRO: ABNORMAL

## 2019-08-06 PROCEDURE — 83735 ASSAY OF MAGNESIUM: CPT

## 2019-08-06 PROCEDURE — 83605 ASSAY OF LACTIC ACID: CPT

## 2019-08-06 PROCEDURE — 85025 COMPLETE CBC W/AUTO DIFF WBC: CPT

## 2019-08-06 PROCEDURE — 81003 URINALYSIS AUTO W/O SCOPE: CPT

## 2019-08-06 PROCEDURE — 80053 COMPREHEN METABOLIC PANEL: CPT

## 2019-08-06 PROCEDURE — 87086 URINE CULTURE/COLONY COUNT: CPT

## 2019-08-06 PROCEDURE — 87040 BLOOD CULTURE FOR BACTERIA: CPT

## 2019-08-06 PROCEDURE — 84145 PROCALCITONIN (PCT): CPT

## 2019-08-06 PROCEDURE — 36415 COLL VENOUS BLD VENIPUNCTURE: CPT

## 2019-08-07 ENCOUNTER — HOSPITAL ENCOUNTER (OUTPATIENT)
Dept: CARDIAC CATH/INVASIVE PROCEDURES | Age: 69
Discharge: HOME | DRG: 242 | End: 2019-08-07
Attending: INTERNAL MEDICINE | Admitting: INTERNAL MEDICINE
Payer: COMMERCIAL

## 2019-08-07 ENCOUNTER — APPOINTMENT (OUTPATIENT)
Dept: GENERAL RADIOLOGY | Age: 69
DRG: 242 | End: 2019-08-07
Attending: INTERNAL MEDICINE
Payer: COMMERCIAL

## 2019-08-07 ENCOUNTER — ANESTHESIA EVENT (OUTPATIENT)
Dept: SURGERY | Age: 69
DRG: 242 | End: 2019-08-07
Payer: COMMERCIAL

## 2019-08-07 ENCOUNTER — ANESTHESIA (OUTPATIENT)
Dept: SURGERY | Age: 69
DRG: 242 | End: 2019-08-07
Payer: COMMERCIAL

## 2019-08-07 ENCOUNTER — APPOINTMENT (OUTPATIENT)
Dept: GENERAL RADIOLOGY | Age: 69
DRG: 242 | End: 2019-08-07
Attending: NURSE PRACTITIONER
Payer: COMMERCIAL

## 2019-08-07 ENCOUNTER — HOSPITAL ENCOUNTER (INPATIENT)
Age: 69
LOS: 1 days | Discharge: NURSING, REHAB OR LONG-TERM CARE | DRG: 242 | End: 2019-08-08
Attending: INTERNAL MEDICINE | Admitting: INTERNAL MEDICINE
Payer: COMMERCIAL

## 2019-08-07 VITALS
HEART RATE: 92 BPM | RESPIRATION RATE: 12 BRPM | DIASTOLIC BLOOD PRESSURE: 71 MMHG | OXYGEN SATURATION: 97 % | TEMPERATURE: 100.1 F | SYSTOLIC BLOOD PRESSURE: 135 MMHG

## 2019-08-07 DIAGNOSIS — G93.41 ACUTE METABOLIC ENCEPHALOPATHY: ICD-10-CM

## 2019-08-07 DIAGNOSIS — I47.21 TORSADES DE POINTES: ICD-10-CM

## 2019-08-07 DIAGNOSIS — R09.02 HYPOXIA: ICD-10-CM

## 2019-08-07 DIAGNOSIS — R53.81 DEBILITY: ICD-10-CM

## 2019-08-07 DIAGNOSIS — R06.02 SOB (SHORTNESS OF BREATH): ICD-10-CM

## 2019-08-07 PROBLEM — I47.20 V-TACH: Status: ACTIVE | Noted: 2019-08-07

## 2019-08-07 PROBLEM — R00.1 SYMPTOMATIC BRADYCARDIA: Status: ACTIVE | Noted: 2019-08-07

## 2019-08-07 LAB
ANION GAP SERPL CALC-SCNC: 11 MMOL/L (ref 7–16)
ARTERIAL PATENCY WRIST A: ABNORMAL
ARTERIAL PATENCY WRIST A: ABNORMAL
BACTERIA SPEC CULT: NORMAL
BACTERIA SPEC CULT: NORMAL
BASE DEFICIT BLD-SCNC: 1 MMOL/L
BASE DEFICIT BLD-SCNC: 3 MMOL/L
BASOPHILS # BLD: 0.1 K/UL (ref 0–0.2)
BASOPHILS NFR BLD: 0 % (ref 0–2)
BDY SITE: ABNORMAL
BDY SITE: ABNORMAL
BODY TEMPERATURE: 98.6
BODY TEMPERATURE: 98.6
BUN SERPL-MCNC: 29 MG/DL (ref 8–23)
CALCIUM SERPL-MCNC: 7.7 MG/DL (ref 8.3–10.4)
CHLORIDE SERPL-SCNC: 104 MMOL/L (ref 98–107)
CO2 BLD-SCNC: 24 MMOL/L
CO2 BLD-SCNC: 24 MMOL/L
CO2 SERPL-SCNC: 22 MMOL/L (ref 21–32)
COLLECT TIME,HTIME: 1820
COLLECT TIME,HTIME: 2143
CREAT SERPL-MCNC: 1.43 MG/DL (ref 0.8–1.5)
DIFFERENTIAL METHOD BLD: ABNORMAL
EOSINOPHIL # BLD: 0 K/UL (ref 0–0.8)
EOSINOPHIL NFR BLD: 0 % (ref 0.5–7.8)
ERYTHROCYTE [DISTWIDTH] IN BLOOD BY AUTOMATED COUNT: 26.1 % (ref 11.9–14.6)
EXHALED MINUTE VOLUME, VE: 2.7 L/MIN
EXHALED MINUTE VOLUME, VE: 9.1 L/MIN
GAS FLOW.O2 O2 DELIVERY SYS: ABNORMAL L/MIN
GAS FLOW.O2 O2 DELIVERY SYS: ABNORMAL L/MIN
GAS FLOW.O2 SETTING OXYMISER: 16 BPM
GLUCOSE BLD STRIP.AUTO-MCNC: 327 MG/DL (ref 65–100)
GLUCOSE SERPL-MCNC: 312 MG/DL (ref 65–100)
HCO3 BLD-SCNC: 22.6 MMOL/L (ref 22–26)
HCO3 BLD-SCNC: 23 MMOL/L (ref 22–26)
HCT VFR BLD AUTO: 28.8 % (ref 41.1–50.3)
HGB BLD-MCNC: 8.5 G/DL (ref 13.6–17.2)
IMM GRANULOCYTES # BLD AUTO: 0.1 K/UL (ref 0–0.5)
IMM GRANULOCYTES NFR BLD AUTO: 1 % (ref 0–5)
INR PPP: 2.6
LYMPHOCYTES # BLD: 0.9 K/UL (ref 0.5–4.6)
LYMPHOCYTES NFR BLD: 5 % (ref 13–44)
MAGNESIUM SERPL-MCNC: 2 MG/DL (ref 1.8–2.4)
MCH RBC QN AUTO: 24 PG (ref 26.1–32.9)
MCHC RBC AUTO-ENTMCNC: 29.5 G/DL (ref 31.4–35)
MCV RBC AUTO: 81.4 FL (ref 79.6–97.8)
MONOCYTES # BLD: 1.7 K/UL (ref 0.1–1.3)
MONOCYTES NFR BLD: 9 % (ref 4–12)
NEUTS SEG # BLD: 15 K/UL (ref 1.7–8.2)
NEUTS SEG NFR BLD: 84 % (ref 43–78)
NRBC # BLD: 0 K/UL (ref 0–0.2)
O2/TOTAL GAS SETTING VFR VENT: 40 %
O2/TOTAL GAS SETTING VFR VENT: 40 %
PCO2 BLD: 36.5 MMHG (ref 35–45)
PCO2 BLD: 42.2 MMHG (ref 35–45)
PEEP RESPIRATORY: 10 CMH2O
PEEP RESPIRATORY: 8 CMH2O
PH BLD: 7.34 [PH] (ref 7.35–7.45)
PH BLD: 7.41 [PH] (ref 7.35–7.45)
PLATELET # BLD AUTO: 380 K/UL (ref 150–450)
PMV BLD AUTO: 10.6 FL (ref 9.4–12.3)
PO2 BLD: 105 MMHG (ref 75–100)
PO2 BLD: 151 MMHG (ref 75–100)
POTASSIUM SERPL-SCNC: 4.1 MMOL/L (ref 3.5–5.1)
PRESSURE CONTROL, IPC: 18
PRESSURE SUPPORT SETTING VENT: 8 CMH2O
PROTHROMBIN TIME: 27.8 SEC (ref 11.7–14.5)
RBC # BLD AUTO: 3.54 M/UL (ref 4.23–5.6)
SAO2 % BLD: 98 % (ref 95–98)
SAO2 % BLD: 99 % (ref 95–98)
SERVICE CMNT-IMP: ABNORMAL
SERVICE CMNT-IMP: NORMAL
SERVICE CMNT-IMP: NORMAL
SODIUM SERPL-SCNC: 137 MMOL/L (ref 136–145)
SPECIMEN TYPE: ABNORMAL
SPECIMEN TYPE: ABNORMAL
TOTAL RESP. RATE, ITRR: 16
VENTILATION MODE VENT: ABNORMAL
VENTILATION MODE VENT: ABNORMAL
WBC # BLD AUTO: 17.8 K/UL (ref 4.3–11.1)

## 2019-08-07 PROCEDURE — 02HK3JZ INSERTION OF PACEMAKER LEAD INTO RIGHT VENTRICLE, PERCUTANEOUS APPROACH: ICD-10-PCS | Performed by: INTERNAL MEDICINE

## 2019-08-07 PROCEDURE — C1786 PMKR, SINGLE, RATE-RESP: HCPCS

## 2019-08-07 PROCEDURE — 82803 BLOOD GASES ANY COMBINATION: CPT

## 2019-08-07 PROCEDURE — 71045 X-RAY EXAM CHEST 1 VIEW: CPT

## 2019-08-07 PROCEDURE — 93005 ELECTROCARDIOGRAM TRACING: CPT | Performed by: INTERNAL MEDICINE

## 2019-08-07 PROCEDURE — C1892 INTRO/SHEATH,FIXED,PEEL-AWAY: HCPCS

## 2019-08-07 PROCEDURE — 74011250636 HC RX REV CODE- 250/636: Performed by: INTERNAL MEDICINE

## 2019-08-07 PROCEDURE — 65620000000 HC RM CCU GENERAL

## 2019-08-07 PROCEDURE — 77030031139 HC SUT VCRL2 J&J -A

## 2019-08-07 PROCEDURE — 77030034850

## 2019-08-07 PROCEDURE — 83735 ASSAY OF MAGNESIUM: CPT

## 2019-08-07 PROCEDURE — 0BH17EZ INSERTION OF ENDOTRACHEAL AIRWAY INTO TRACHEA, VIA NATURAL OR ARTIFICIAL OPENING: ICD-10-PCS | Performed by: INTERNAL MEDICINE

## 2019-08-07 PROCEDURE — 76060000034 HC ANESTHESIA 1.5 TO 2 HR: Performed by: INTERNAL MEDICINE

## 2019-08-07 PROCEDURE — 85610 PROTHROMBIN TIME: CPT

## 2019-08-07 PROCEDURE — 74011000258 HC RX REV CODE- 258

## 2019-08-07 PROCEDURE — 0JH604Z INSERTION OF PACEMAKER, SINGLE CHAMBER INTO CHEST SUBCUTANEOUS TISSUE AND FASCIA, OPEN APPROACH: ICD-10-PCS | Performed by: INTERNAL MEDICINE

## 2019-08-07 PROCEDURE — 77030012935 HC DRSG AQUACEL BMS -B

## 2019-08-07 PROCEDURE — 74011000250 HC RX REV CODE- 250: Performed by: INTERNAL MEDICINE

## 2019-08-07 PROCEDURE — 85025 COMPLETE CBC W/AUTO DIFF WBC: CPT

## 2019-08-07 PROCEDURE — C1898 LEAD, PMKR, OTHER THAN TRANS: HCPCS

## 2019-08-07 PROCEDURE — 36600 WITHDRAWAL OF ARTERIAL BLOOD: CPT

## 2019-08-07 PROCEDURE — 80048 BASIC METABOLIC PNL TOTAL CA: CPT

## 2019-08-07 PROCEDURE — 77030013687 HC GD NDL BARD -B

## 2019-08-07 PROCEDURE — 33207 INSERT HEART PM VENTRICULAR: CPT

## 2019-08-07 PROCEDURE — 77030002912 HC SUT ETHBND J&J -A

## 2019-08-07 PROCEDURE — 77030022704 HC SUT VLOC COVD -B

## 2019-08-07 PROCEDURE — 74011000272 HC RX REV CODE- 272: Performed by: INTERNAL MEDICINE

## 2019-08-07 PROCEDURE — 74011250636 HC RX REV CODE- 250/636

## 2019-08-07 PROCEDURE — 77030008462 HC STPLR SKN PROX J&J -A

## 2019-08-07 PROCEDURE — 74011000250 HC RX REV CODE- 250

## 2019-08-07 PROCEDURE — 5A1935Z RESPIRATORY VENTILATION, LESS THAN 24 CONSECUTIVE HOURS: ICD-10-PCS | Performed by: INTERNAL MEDICINE

## 2019-08-07 PROCEDURE — 77030019908 HC STETH ESOPH SIMS -A: Performed by: ANESTHESIOLOGY

## 2019-08-07 PROCEDURE — 94002 VENT MGMT INPAT INIT DAY: CPT

## 2019-08-07 PROCEDURE — 82962 GLUCOSE BLOOD TEST: CPT

## 2019-08-07 PROCEDURE — 77030019605

## 2019-08-07 PROCEDURE — 36415 COLL VENOUS BLD VENIPUNCTURE: CPT

## 2019-08-07 RX ORDER — MORPHINE SULFATE 2 MG/ML
2 INJECTION, SOLUTION INTRAMUSCULAR; INTRAVENOUS
Status: DISCONTINUED | OUTPATIENT
Start: 2019-08-07 | End: 2019-08-08

## 2019-08-07 RX ORDER — TRAMADOL HYDROCHLORIDE 50 MG/1
50 TABLET ORAL
Status: DISCONTINUED | OUTPATIENT
Start: 2019-08-07 | End: 2019-08-07 | Stop reason: SDUPTHER

## 2019-08-07 RX ORDER — GUAIFENESIN 100 MG/5ML
81 LIQUID (ML) ORAL DAILY
Status: DISCONTINUED | OUTPATIENT
Start: 2019-08-08 | End: 2019-08-08

## 2019-08-07 RX ORDER — ACETAMINOPHEN 325 MG/1
650 TABLET ORAL
Status: DISCONTINUED | OUTPATIENT
Start: 2019-08-07 | End: 2019-08-07 | Stop reason: SDUPTHER

## 2019-08-07 RX ORDER — LIDOCAINE HYDROCHLORIDE AND EPINEPHRINE 10; 10 MG/ML; UG/ML
1.5 INJECTION, SOLUTION INFILTRATION; PERINEURAL
Status: DISCONTINUED | OUTPATIENT
Start: 2019-08-07 | End: 2019-08-08

## 2019-08-07 RX ORDER — SODIUM CHLORIDE 0.9 % (FLUSH) 0.9 %
5-40 SYRINGE (ML) INJECTION AS NEEDED
Status: DISCONTINUED | OUTPATIENT
Start: 2019-08-07 | End: 2019-08-08 | Stop reason: HOSPADM

## 2019-08-07 RX ORDER — SODIUM CHLORIDE 0.9 % (FLUSH) 0.9 %
5-40 SYRINGE (ML) INJECTION EVERY 8 HOURS
Status: CANCELLED | OUTPATIENT
Start: 2019-08-07

## 2019-08-07 RX ORDER — ACETAMINOPHEN 325 MG/1
650 TABLET ORAL
Status: CANCELLED | OUTPATIENT
Start: 2019-08-07

## 2019-08-07 RX ORDER — ACETAMINOPHEN 325 MG/1
650 TABLET ORAL
Status: DISCONTINUED | OUTPATIENT
Start: 2019-08-07 | End: 2019-08-08

## 2019-08-07 RX ORDER — INSULIN LISPRO 100 [IU]/ML
INJECTION, SOLUTION INTRAVENOUS; SUBCUTANEOUS EVERY 6 HOURS
Status: DISCONTINUED | OUTPATIENT
Start: 2019-08-08 | End: 2019-08-08

## 2019-08-07 RX ORDER — INSULIN LISPRO 100 [IU]/ML
INJECTION, SOLUTION INTRAVENOUS; SUBCUTANEOUS EVERY 6 HOURS
Status: DISCONTINUED | OUTPATIENT
Start: 2019-08-08 | End: 2019-08-07 | Stop reason: SDUPTHER

## 2019-08-07 RX ORDER — SODIUM CHLORIDE 9 MG/ML
INJECTION, SOLUTION INTRAVENOUS
Status: DISCONTINUED | OUTPATIENT
Start: 2019-08-07 | End: 2019-08-07 | Stop reason: HOSPADM

## 2019-08-07 RX ORDER — ROCURONIUM BROMIDE 10 MG/ML
INJECTION, SOLUTION INTRAVENOUS AS NEEDED
Status: DISCONTINUED | OUTPATIENT
Start: 2019-08-07 | End: 2019-08-07 | Stop reason: HOSPADM

## 2019-08-07 RX ORDER — SODIUM CHLORIDE 0.9 % (FLUSH) 0.9 %
5-40 SYRINGE (ML) INJECTION AS NEEDED
Status: DISCONTINUED | OUTPATIENT
Start: 2019-08-07 | End: 2019-08-13 | Stop reason: HOSPADM

## 2019-08-07 RX ORDER — ATORVASTATIN CALCIUM 40 MG/1
40 TABLET, FILM COATED ORAL
Status: DISCONTINUED | OUTPATIENT
Start: 2019-08-07 | End: 2019-08-07

## 2019-08-07 RX ORDER — MORPHINE SULFATE 2 MG/ML
2 INJECTION, SOLUTION INTRAMUSCULAR; INTRAVENOUS
Status: DISCONTINUED | OUTPATIENT
Start: 2019-08-07 | End: 2019-08-07 | Stop reason: SDUPTHER

## 2019-08-07 RX ORDER — VANCOMYCIN 1.75 GRAM/500 ML IN 0.9 % SODIUM CHLORIDE INTRAVENOUS
1750 EVERY 24 HOURS
Status: DISCONTINUED | OUTPATIENT
Start: 2019-08-08 | End: 2019-08-08

## 2019-08-07 RX ORDER — ATORVASTATIN CALCIUM 40 MG/1
40 TABLET, FILM COATED ORAL
Status: DISCONTINUED | OUTPATIENT
Start: 2019-08-08 | End: 2019-08-08

## 2019-08-07 RX ORDER — SODIUM CHLORIDE 0.9 % (FLUSH) 0.9 %
5-40 SYRINGE (ML) INJECTION EVERY 8 HOURS
Status: DISCONTINUED | OUTPATIENT
Start: 2019-08-07 | End: 2019-08-08

## 2019-08-07 RX ORDER — HYDROCODONE BITARTRATE AND ACETAMINOPHEN 5; 325 MG/1; MG/1
1 TABLET ORAL
Status: DISCONTINUED | OUTPATIENT
Start: 2019-08-07 | End: 2019-08-08

## 2019-08-07 RX ORDER — SODIUM CHLORIDE 0.9 % (FLUSH) 0.9 %
5-40 SYRINGE (ML) INJECTION EVERY 8 HOURS
Status: DISCONTINUED | OUTPATIENT
Start: 2019-08-07 | End: 2019-08-13 | Stop reason: HOSPADM

## 2019-08-07 RX ORDER — SODIUM CHLORIDE 0.9 % (FLUSH) 0.9 %
5-40 SYRINGE (ML) INJECTION AS NEEDED
Status: CANCELLED | OUTPATIENT
Start: 2019-08-07

## 2019-08-07 RX ORDER — MORPHINE SULFATE 10 MG/ML
2 INJECTION, SOLUTION INTRAMUSCULAR; INTRAVENOUS
Status: CANCELLED | OUTPATIENT
Start: 2019-08-07

## 2019-08-07 RX ORDER — HYDROCODONE BITARTRATE AND ACETAMINOPHEN 5; 325 MG/1; MG/1
1 TABLET ORAL
Status: CANCELLED | OUTPATIENT
Start: 2019-08-07

## 2019-08-07 RX ORDER — INSULIN LISPRO 100 [IU]/ML
INJECTION, SOLUTION INTRAVENOUS; SUBCUTANEOUS EVERY 6 HOURS
Status: DISCONTINUED | OUTPATIENT
Start: 2019-08-07 | End: 2019-08-08

## 2019-08-07 RX ORDER — FAMOTIDINE 20 MG/1
20 TABLET, FILM COATED ORAL 2 TIMES DAILY
Status: DISCONTINUED | OUTPATIENT
Start: 2019-08-07 | End: 2019-08-08

## 2019-08-07 RX ORDER — DOCUSATE SODIUM 100 MG/1
100 CAPSULE, LIQUID FILLED ORAL 2 TIMES DAILY
Status: DISCONTINUED | OUTPATIENT
Start: 2019-08-07 | End: 2019-08-08

## 2019-08-07 RX ORDER — NITROGLYCERIN 0.4 MG/1
0.4 TABLET SUBLINGUAL
Status: DISCONTINUED | OUTPATIENT
Start: 2019-08-07 | End: 2019-08-08

## 2019-08-07 RX ADMIN — SODIUM CHLORIDE: 9 INJECTION, SOLUTION INTRAVENOUS at 15:51

## 2019-08-07 RX ADMIN — ROCURONIUM BROMIDE 30 MG: 10 INJECTION, SOLUTION INTRAVENOUS at 16:16

## 2019-08-07 RX ADMIN — ROCURONIUM BROMIDE 20 MG: 10 INJECTION, SOLUTION INTRAVENOUS at 16:47

## 2019-08-07 RX ADMIN — ROCURONIUM BROMIDE 20 MG: 10 INJECTION, SOLUTION INTRAVENOUS at 16:42

## 2019-08-07 RX ADMIN — LIDOCAINE HYDROCHLORIDE,EPINEPHRINE BITARTRATE 15 MG: 10; .01 INJECTION, SOLUTION INFILTRATION; PERINEURAL at 16:43

## 2019-08-07 RX ADMIN — Medication 3 G: at 16:00

## 2019-08-07 RX ADMIN — Medication 10 ML: at 23:08

## 2019-08-07 RX ADMIN — NEOMYCIN AND POLYMYXIN B SULFATES: 40; 200000 SOLUTION IRRIGATION at 16:42

## 2019-08-07 NOTE — PROGRESS NOTES
Patient was transferred from [facility]. Tube placement verified by auscultation. ET Tube is secured at the 22 cm colby at the lip and on the right side. Breath sounds are diminished. Patient is Negative for subcutaneous air and chest excursion is symmetric. Trachea is midline. Patient is also Negative for cyanosis and is Positive for pitting edema. Patient placed on ventilator on documented settings. All alarms are set and audible. Resuscitation bag is at the head of the bed. Ventilator Settings Mode FIO2 Rate Tidal Volume Pressure PEEP I:E Ratio Pressure control  69 %   16      18 10 cm H20  1:3.7 Peak airway pressure: 29.7 cm H2O Minute ventilation: 7.5 l/min ABG: No results for input(s): PH, PCO2, PO2, HCO3 in the last 72 hours.

## 2019-08-07 NOTE — PROGRESS NOTES
TRANSFER - IN REPORT: 
 
Verbal report received from Tracy RN(name) on Maddison Banda  being received from EP lab(unit) for routine progression of care Report consisted of patients Situation, Background, Assessment and  
Recommendations(SBAR). Information from the following report(s) SBAR, Kardex, Procedure Summary, Intake/Output, MAR, Accordion and Recent Results was reviewed with the receiving nurse. Opportunity for questions and clarification was provided. Assessment completed upon patients arrival to unit and care assumed.

## 2019-08-07 NOTE — ANESTHESIA POSTPROCEDURE EVALUATION
Procedure(s): SINGLE CHAMBER PACEMAKER INSERTION. general 
 
Anesthesia Post Evaluation Multimodal analgesia: multimodal analgesia not used between 6 hours prior to anesthesia start to PACU discharge Patient location during evaluation: PACU Patient participation: complete - patient cannot participate Level of consciousness: obtunded/minimal responses Pain management: adequate Airway patency: patent Anesthetic complications: no 
Cardiovascular status: acceptable Respiratory status: acceptable Hydration status: acceptable Post anesthesia nausea and vomiting:  none Vitals Value Taken Time BP Temp Pulse 91 8/7/2019  5:59 PM  
Resp 19 8/7/2019  5:59 PM  
SpO2 98 % 8/7/2019  5:59 PM

## 2019-08-07 NOTE — H&P
[address] Cardiology History & Physical  
  
Date of  Admission: 8/7/2019  1:48 PM  
 
Primary Care Physician: Dr. Sedrick Spence Primary Cardiologist: Dr. Shannon Lovett Admitting Physician: Dr. Adrienne Rodas CC: VTACH 
 
HPI:  Sam Ken is a 68 y.o. male with multiple comorbidities including CAD, cardiomyopathy, CKD, morbid obesity, chronic sHF, HTN, recent shoulder fracture, UTI and hypovolemic shock who has been admitted from [facility] due to VTACH/torsades. Patient was recently admitted to [facility] after extended stay at [facility] due to hypovolemic shock and UTI after a fall with broken shoulder. On the night of 8/5, patient was found to be in [facility] and was ultimately intubated. Patient was started on Isuprel drip for treatment of prolonged QT. He was seen by EP with recommendations for PPM implantation. Past Medical History:  
Diagnosis Date  Anxiety Managed with meds  Arthritis  Atrial flutter ([address] 2/3/2016  CAD (coronary artery disease) Bypass x 5, Dr. Wilmer Dickson follows  Cardiomyopathy ([address]  Chronic systolic heart failure ([address] 10/20/2010  CKD (chronic kidney disease) stage 3, GFR 30-59 ml/min ([facility]) 10/18/2010 Followed by Dr. Sedrick Spence  Diabetic peripheral neuropathy ([address] 9/15/2015  Dyslipidemia 10/18/2010 Daily meds  Edema  Essential hypertension, benign 09/15/2015 Mnaged with meds  H/O falling  Insomnia 9/15/2015  
 sleep apnea - uses CPAP  
 Mixed hyperlipidemia 9/15/2015  Morbid obesity ([address] 10/18/2010  Nephrolithiasis  Oxygen dependent O2 @ 4L NC   
 PAD (peripheral artery disease) ([address] ANGIOPLASTY- Right SFA angioplasty and stent for claudication Dr. Thu Vigil, Vascular- Dr. Fely Hawk  Peripheral neuropathy  Pneumonia 07/04/2019  Renal insufficiency  Right heart failure (HCC)  S/P CABG (coronary artery bypass graft)  Sleep apnea  C-pap and 4 L O2 per NC  
  Type II or unspecified type diabetes mellitus without mention of complication, uncontrolled ([facility] 09/15/2015 Insulin, Average recently 130-300, unknown last A1C Past Surgical History:  
Procedure Laterality Date  CARDIAC SURG PROCEDURE UNLIST By-pass x 5  
 HX ANGIOPLASTY    
 right SFA angioplasty and stent for claudication Dr. Jennifer Tripathi  HX CATARACT REMOVAL Bilateral   
 HX HEART CATHETERIZATION    
 HX UROLOGICAL Kidney stone removal  
 
 
No Known Allergies Social History Socioeconomic History  Marital status:  Spouse name: Not on file  Number of children: Not on file  Years of education: Not on file  Highest education level: Not on file Occupational History  Not on file Social Needs  Financial resource strain: Not on file  Food insecurity:  
  Worry: Not on file Inability: Not on file  Transportation needs:  
  Medical: Not on file Non-medical: Not on file Tobacco Use  Smoking status: Former Smoker Years: 20.00 Last attempt to quit: 1986 Years since quittin.6  Smokeless tobacco: Never Used Substance and Sexual Activity  Alcohol use: No  
  Comment: quit  Drug use: No  
 Sexual activity: Never Lifestyle  Physical activity:  
  Days per week: Not on file Minutes per session: Not on file  Stress: Not on file Relationships  Social connections:  
  Talks on phone: Not on file Gets together: Not on file Attends Shinto service: Not on file Active member of club or organization: Not on file Attends meetings of clubs or organizations: Not on file Relationship status: Not on file  Intimate partner violence:  
  Fear of current or ex partner: Not on file Emotionally abused: Not on file Physically abused: Not on file Forced sexual activity: Not on file Other Topics Concern  Not on file Social History Narrative  Not on file Family History Problem Relation Age of Onset  No Known Problems Mother  Coronary Artery Disease Father  No Known Problems Sister Current Outpatient Medications Medication Sig  
 ferrous sulfate 325 mg (65 mg iron) tablet Take 1 Tab by mouth two (2) times daily (with meals) for 30 days.  ammonium lactate (LAC-HYDRIN) 12 % lotion Apply  to affected area nightly. rub in to affected area well BLE  
 albuterol-ipratropium (DUO-NEB) 2.5 mg-0.5 mg/3 ml nebu 3 mL by Nebulization route four (4) times daily.  Oxygen Indications: 3 L via NC  warfarin (COUMADIN) 2.5 mg tablet Take 2.5 mg by mouth every Tuesday, Thursday, Saturday & Sunday.  HUMALOG U-100 INSULIN 100 unit/mL injection by SubCUTAneous route. Indications: sliding scale  simethicone (MYLICON) 80 mg chewable tablet Take 1 Tab by mouth four (4) times daily as needed for Flatulence.  allopurinol (ZYLOPRIM) 100 mg tablet Take 100 mg by mouth daily.  warfarin (COUMADIN) 5 mg tablet Take 5 mg by mouth every Monday, Wednesday, Friday.  atorvastatin (LIPITOR) 40 mg tablet 1 every day for cholesterol (replaces simvastatin) (Patient taking differently: Take 40 mg by mouth nightly. Indications: combined high blood cholesterol and triglyceride level)  azelastine (ASTELIN) 137 mcg (0.1 %) nasal spray Use in each nostril bid (Patient taking differently: 2 Sprays by Both Nostrils route two (2) times a day.)  azelastine (OPTIVAR) 0.05 % ophthalmic solution Use in affected eye bid to tid for allergies (Patient taking differently: Administer 1 Drop to both eyes three (3) times daily.)  Insulin Syringe-Needle U-100 (BD INSULIN SYRINGE) 1 mL 28 gauge x 1/2\" syrg Use 5 x a day. Dx E11.65  Indications: 5x a day  glucose blood VI test strips (ONETOUCH ULTRA TEST) strip Use bid to tid   DX: E 11.65   Disp with lancets of formulary  Indications: dispense with lancets  bumetanide (BUMEX) 1 mg tablet 1 to 2 every day for fluid (Patient taking differently: Take 2 mg by mouth daily. 1 to 2 every day for fluid)  DULoxetine (CYMBALTA) 60 mg capsule Take 1 Cap by mouth daily. Indications: ANXIETY WITH DEPRESSION, NEUROPATHIC PAIN  
 lancets (ONETOUCH DELICA LANCETS) 30 gauge misc Use up to tid   Dx E11.65  Blood-Glucose Meter (ONETOUCH ULTRA2) monitoring kit Use bid to tid   DX E11.65  If another product preferred on formulary please let me know which to select from. jlb  aspirin 81 mg chewable tablet Take 1 Tab by mouth daily.  polyethylene glycol (MIRALAX) 17 gram packet Take 1 Packet by mouth daily.  hydrocortisone (ANUSOL-HC) 2.5 % rectal cream Insert  into rectum four (4) times daily. (Patient taking differently: Insert  into rectum four (4) times daily as needed.)  nitroglycerin (NITROSTAT) 0.4 mg SL tablet 1 Tab by SubLINGual route as needed for Chest Pain.  fluticasone (FLONASE) 50 mcg/actuation nasal spray 1 spray IEN every day to BID (Patient taking differently: 1 Spray by Both Nostrils route two (2) times a day.) Current Facility-Administered Medications Medication Dose Route Frequency  ceFAZolin (ANCEF) 3 g/30 mL in sterile water IV syringe  3 g IntraVENous ON CALL  
 bacitracin 50,000 Units, neomycin-polymyxin B  2 mL in sterile water irrigation 1,000 mL irrigation   Irrigation ONCE  
 lidocaine-EPINEPHrine (XYLOCAINE) 1 %-1:100,000 injection 15 mg  1.5 mL IntraDERMal Multiple  sodium chloride (NS) flush 5-40 mL  5-40 mL IntraVENous Q8H  
 sodium chloride (NS) flush 5-40 mL  5-40 mL IntraVENous PRN  
 [START ON 8/8/2019] aspirin chewable tablet 81 mg  81 mg Oral DAILY  nitroglycerin (NITROSTAT) tablet 0.4 mg  0.4 mg SubLINGual Q5MIN PRN  
 morphine injection 2 mg  2 mg IntraVENous Q4H PRN  
 acetaminophen (TYLENOL) tablet 650 mg  650 mg Oral Q4H PRN  
 vancomycin (VANCOCIN) 1750 mg in  ml infusion  1,750 mg IntraVENous Q24H  
 docusate sodium (COLACE) capsule 100 mg  100 mg Oral BID  insulin lispro (HUMALOG) injection   SubCUTAneous Q6H  
 famotidine (PEPCID) tablet 20 mg  20 mg Oral BID  traMADol (ULTRAM) tablet 50 mg  50 mg Oral Q6H PRN  
 atorvastatin (LIPITOR) tablet 40 mg  40 mg Oral QHS Review of Systems Review of Systems Unable to perform ROS: Intubated Subjective: There were no vitals taken for this visit. Physical Exam  
Constitutional: He is well-developed, well-nourished, and in no distress. Eyes: Pupils are equal, round, and reactive to light. Neck: No JVD present. Cardiovascular: Normal rate and regular rhythm. Pulmonary/Chest: Effort normal and breath sounds normal.  
Abdominal: Soft. Bowel sounds are normal.  
Musculoskeletal: He exhibits no edema. Neurological: Intubated and sedated Skin: Skin is warm and dry. Psychiatric: Affect normal.  
 
 
Cardiographics Telemetry: normal sinus rhythm ECG: normal EKG, normal sinus rhythm Echocardiogram: from 8/2/19 
-  Left ventricle: Systolic function was normal. Ejection fraction was estimated in the range of 55 % to 60 %. This study was inadequate for the evaluation of regional wall motion. -  Left atrium: The atrium was mildly dilated. -  Inferior vena cava, hepatic veins: The inferior vena cava was mildly dilated. The respirophasic change in diameter was more than 50%. Labs:  
Recent Results (from the past 24 hour(s)) PROTHROMBIN TIME + INR Collection Time: 08/07/19  8:04 AM  
Result Value Ref Range Prothrombin time 27.8 (H) 11.7 - 14.5 sec INR 2.6    
CBC WITH AUTOMATED DIFF Collection Time: 08/07/19  8:04 AM  
Result Value Ref Range WBC 17.8 (H) 4.3 - 11.1 K/uL  
 RBC 3.54 (L) 4.23 - 5.6 M/uL HGB 8.5 (L) 13.6 - 17.2 g/dL HCT 28.8 (L) 41.1 - 50.3 % MCV 81.4 79.6 - 97.8 FL  
 MCH 24.0 (L) 26.1 - 32.9 PG  
 MCHC 29.5 (L) 31.4 - 35.0 g/dL RDW 26.1 (H) 11.9 - 14.6 % PLATELET 945 344 - 864 K/uL MPV 10.6 9.4 - 12.3 FL ABSOLUTE NRBC 0.00 0.0 - 0.2 K/uL  
 DF AUTOMATED NEUTROPHILS 84 (H) 43 - 78 % LYMPHOCYTES 5 (L) 13 - 44 % MONOCYTES 9 4.0 - 12.0 % EOSINOPHILS 0 (L) 0.5 - 7.8 % BASOPHILS 0 0.0 - 2.0 % IMMATURE GRANULOCYTES 1 0.0 - 5.0 %  
 ABS. NEUTROPHILS 15.0 (H) 1.7 - 8.2 K/UL  
 ABS. LYMPHOCYTES 0.9 0.5 - 4.6 K/UL  
 ABS. MONOCYTES 1.7 (H) 0.1 - 1.3 K/UL  
 ABS. EOSINOPHILS 0.0 0.0 - 0.8 K/UL  
 ABS. BASOPHILS 0.1 0.0 - 0.2 K/UL  
 ABS. IMM. GRANS. 0.1 0.0 - 0.5 K/UL METABOLIC PANEL, BASIC Collection Time: 08/07/19  8:04 AM  
Result Value Ref Range Sodium 137 136 - 145 mmol/L Potassium 4.1 3.5 - 5.1 mmol/L Chloride 104 98 - 107 mmol/L  
 CO2 22 21 - 32 mmol/L Anion gap 11 7 - 16 mmol/L Glucose 312 (H) 65 - 100 mg/dL BUN 29 (H) 8 - 23 MG/DL Creatinine 1.43 0.8 - 1.5 MG/DL  
 GFR est AA >60 >60 ml/min/1.73m2 GFR est non-AA 52 (L) >60 ml/min/1.73m2 Calcium 7.7 (L) 8.3 - 10.4 MG/DL MAGNESIUM Collection Time: 08/07/19  8:04 AM  
Result Value Ref Range Magnesium 2.0 1.8 - 2.4 mg/dL Patient has been seen and examined by Dr. Julia Luke and he agrees with the following assessment and plan: 
 
 Assessment/Plan:  
  
  V-tach -- plan for PM implantation today. Admit to critical care post-procedure. Consult intensivist for vent weaning. Morbid obesity Symptomatic bradycardia -- see above. UTI -- IV antibiotics continued from previous MAR from [facility]. Johnnie Delgado NP 
8/7/2019 4:05 PM 
 
Attending Addendum Patient independently seen and examined by me. Agree with above note by physician extender with the following additions and exceptions: 75yo with MMP and complex hospital stay admitted for permanent pacemaker in the setting of symptomatic bradycardia complicated by prolonged QT and torsades arrest 
 
Key findings are:  No CP or HOFFMAN 
CV- irreg irreg Lungs- intubated, decreased BS 
 Ext- no edema Plan: Procedure was discussed with family regarding the risks, benefits, and alternatives of an implantable cardiac rhythm device including the benefits of pacemaker therapy that are largely aimed at improving symptoms from symptomatic bradycardia. Additionally, the potential risks of device implantation were discussed, including the risk of bleeding, infection, large blood vessel injury, lung or cardiac injury, venous occlusion, DVT/PE, pneumothorax, cardiac tamponade, perforation, need for urgent open heart surgery or additional procedures, device/lead failure, lead dislodgement, heart attack, stroke, arrhythmia, oversedation, respiratory arrest, and even death.   
     --SC PPM, [employer] [facility] [place] [address] [employer] [facility]

## 2019-08-07 NOTE — PROCEDURES
Pre-Procedure Diagnosis 1. Documented non-reversible symptomatic bradycardia due to sinus node dysfunction. 2. Permanent atrial fibrillation 3. Bradycardia and prolonged QT induced torsades arrest 
 
Procedure Performed 1. Insertion of a single chamber PPM 
 
Anesthesia: * No surgery found * Estimated Blood Loss: Less than 10 mL Specimens: * Cannot find log * Patient Information and Indications: The procedure, indications, risks, benefits, and alternatives were discussed with the patient and family members, who desired to proceed after questions were answered and informed consent was documented. Procedure: After informed consent was obtained, the patient was brought to the Electrophysiology Laboratory in a fasting state and was prepped and draped in sterile fashion. Prophylactic antibiotic was administered 10 minutes prior to skin incision: see anesthesia procedure documentation for details. Conscious sedation was administered by anesthesia with continuous oxygen saturation measurement and blood pressure measurement. Local anesthetic (sensorcaine) was delivered to the left pectoral region and an incision was made parallel to the deltopectoral groove. A subcutaneous pocket was created using blunt dissection and electrocautery, and adequate hemostasis was established. Access x 1 was obtained under ultrasound guidance using a modified Seldinger technique with placement of a short J tipped wire down into the RA and advanced below the diaphragm. Next, placement of a 7Fr peel-away sheath over the guidewire was performed. A permanent RV pm lead was then advanced under fluoroscopic guidance via the 6Fr sheath into the RV in a stable position with satisfactory sensing and pacing characteristics. The peel away sheath was removed. The lead was sutured to the underlying pectoralis fascia using 2 non-absorbable sutures around each the lead collar.  The lead slack and position was assessed under fluoroscopy while securing the lead collars to ensure proper length. Final lead testing via the pacing system analyzer (PSA) demonstrated stable sensing, impedance and pacing thresholds. The lead pin was then cleaned with antibiotic soaked gauze, dried gently, and attached to a new pacemaker generator. The pin was directly observed to pass the tip electrode, and the ring hex wrench screws were secured, and leads tug tested. The device and leads were gently positioned within the pocket after the pocket was irrigated copiously with a saline antimicrobial solution. The wound was closed with multiple layers of absorbable suture followed by skin closure with staples. Fluoroscopic images were interpreted by me, in multiple projections. Final device position was confirmed by stored fluoroscopic image from device pocket to lead tip in AP projection. The patient tolerated the procedure well and left the lab in good condition. Complications: None Lead Data: 
 
Pulse Generator Model #  Serial # Location Implant/Explant [id number] [employer] [id number] Left Pectoral Implant Lead Model Number  Serial Number Lead position Implant/Explant RV [id number] [employer] [id number] RV [place] Implant Lead Sensitivity and Threshold Lead R or P sensitivity (mv) Threshold (V) Threshold PW (msec) Impedance (ohms) Final output Voltage (V) Final PW (msec) RV 8.9 0.4 0.4 643 3.0 0.4 Bradycardia Settings Allen Mode LRL  
VVI 90 No Contrast  
 
Fluoro Time: 3.0 min / 70 mGy Impression: 1. Successful implantation and testing of a [employer]ik single chamber Pacemaker. Gabby Schmidt MD, MS Clinical Cardiac Electrophysiology 8/7/2019 
4:55 PM

## 2019-08-07 NOTE — PROGRESS NOTES
Patient seen earlier this morning at [facility]. Now received s/p pacemaker placement from cath lab. Remains intubated. Currently requiring 50% FiO2 and PEEP 10 to maintain sats. Hope had been to extubate him tonight. However, with this increase in O2 needs will get ABG and CXR now. Depending on ability to wean FiO2 and PEEP may or may not be able to extubate tonight.   
 
Rony Ta MD

## 2019-08-07 NOTE — PROGRESS NOTES
Ventilator check complete; patient has a #7.5 ET tube secured at the 24 at the teeth. Patient is not sedated. Patient is not able to follow commands. Breath sounds are coarse. Trachea is midline, Negative for subcutaneous air, and chest excursion is symmetric. Patient is also Negative for cyanosis and is Negative for pitting edema. All alarms are set and audible. Resuscitation bag is at the head of the bed. Ventilator Settings Mode FIO2 Rate Tidal Volume Pressure PEEP I:E Ratio Pressure control  40 %   16     18cm H20  8 cm H20  1:3.7 Peak airway pressure: 26.4 cm H2O Minute ventilation: 10.5 l/min ABG: No results for input(s): PH, PCO2, PO2, HCO3 in the last 72 hours.  
 
 
Cony Jones, RT

## 2019-08-07 NOTE — ANESTHESIA PREPROCEDURE EVALUATION
Relevant Problems No relevant active problems Anesthetic History No history of anesthetic complications Review of Systems / Medical History Patient summary reviewed and pertinent labs reviewed Pulmonary Sleep apnea (4 liters oxygen q 24): CPAP Comments: resp failure on vent Neuro/Psych Within defined limits Cardiovascular Hypertension: well controlled CHF Dysrhythmias : atrial flutter and atrial fibrillation Past MI, CAD, PAD, CABG (x 5; 2004) and hyperlipidemia Exercise tolerance: <4 METS: walker Comments: Cardiac arrest times 3 - first Vtach on 8/3, Torsades arrest 8/4 and 8/6 Echo 8/4/2019   EF 55-60% Lord Morteza GI/Hepatic/Renal 
Within defined limits Renal disease (stage 3): CRI and stones Endo/Other Diabetes: poorly controlled, type 2, using insulin Morbid obesity (Supramorbid Obesity) Other Findings Comments: INR 2.6  
septic Physical Exam 
 
Airway Intubated Cardiovascular Rhythm: regular Rate: normal 
 
 
 
 Dental 
No notable dental hx Pulmonary Breath sounds clear to auscultation Abdominal 
GI exam deferred Other Findings Anesthetic Plan ASA: 4 Anesthesia type: general 
 
 
 
Post procedure ventilation Induction: Intravenous Anesthetic plan and risks discussed with: Patient Propofol sedation on vent

## 2019-08-07 NOTE — PROGRESS NOTES
Pharmacokinetic Consult to Pharmacist 
 
Beulah Lennox is a 68 y.o. male being treated for UTI with Vancomycin. Lab Results Component Value Date/Time BUN 29 (H) 08/07/2019 08:04 AM  
 Creatinine 1.43 08/07/2019 08:04 AM  
 WBC 17.8 (H) 08/07/2019 08:04 AM  
 Procalcitonin 0.4 08/06/2019 07:22 AM  
 Lactic acid 2.0 08/06/2019 08:39 AM  
 Lactic Acid (POC) 3.5 (H) 11/06/2016 09:52 PM  
  
Estimated Creatinine Clearance: 69.6 mL/min (based on SCr of 1.43 mg/dL). CULTURES: 
pending Day 1 of vancomycin. Goal trough is 10-20. Vancomycin dose initiated at 1750 mg Q24H. Plan trough prior to 4th dose. Will continue to follow patient. Thank you, 
Jesse Kelley, PharmD Clinical Pharmacist 
[phone]

## 2019-08-07 NOTE — PROGRESS NOTES
Dual skin assessment complete with Rahel Reyes. Left chest aquacell dressing from pacemaker site CDI. Left shoulder ORIF dressing CDI. Excoriation to bilateral abdominal folds near groin. Bilateral legs dannie with scaly patches of skin. Left foot third toe with black blister. Bilateral heels with breakdown and possible ulcers. Prevalon boots applied bilaterally. Sacrum with ulcer and allyven applied. Patient scrubbed with ellis wipes and bathed thoroughly.

## 2019-08-08 ENCOUNTER — HOSPITAL ENCOUNTER (OUTPATIENT)
Age: 69
Discharge: NURSING, REHAB OR LONG-TERM CARE | End: 2019-08-20
Attending: INTERNAL MEDICINE | Admitting: INTERNAL MEDICINE

## 2019-08-08 ENCOUNTER — APPOINTMENT (OUTPATIENT)
Dept: GENERAL RADIOLOGY | Age: 69
End: 2019-08-08
Attending: INTERNAL MEDICINE

## 2019-08-08 VITALS
HEART RATE: 91 BPM | OXYGEN SATURATION: 98 % | WEIGHT: 315 LBS | TEMPERATURE: 98.1 F | DIASTOLIC BLOOD PRESSURE: 54 MMHG | BODY MASS INDEX: 42.76 KG/M2 | RESPIRATION RATE: 19 BRPM | SYSTOLIC BLOOD PRESSURE: 104 MMHG

## 2019-08-08 PROBLEM — I49.9 ARRHYTHMIA: Status: ACTIVE | Noted: 2019-08-08

## 2019-08-08 PROBLEM — I47.21 TORSADES DE POINTES: Status: ACTIVE | Noted: 2019-08-08

## 2019-08-08 LAB
ANION GAP SERPL CALC-SCNC: 6 MMOL/L (ref 7–16)
ATRIAL RATE: 89 BPM
BACTERIA SPEC CULT: NORMAL
BUN SERPL-MCNC: 27 MG/DL (ref 8–23)
CALCIUM SERPL-MCNC: 7.9 MG/DL (ref 8.3–10.4)
CALCULATED R AXIS, ECG10: -90 DEGREES
CALCULATED T AXIS, ECG11: 95 DEGREES
CHLORIDE SERPL-SCNC: 104 MMOL/L (ref 98–107)
CO2 SERPL-SCNC: 28 MMOL/L (ref 21–32)
CREAT SERPL-MCNC: 1.21 MG/DL (ref 0.8–1.5)
DIAGNOSIS, 93000: NORMAL
ERYTHROCYTE [DISTWIDTH] IN BLOOD BY AUTOMATED COUNT: 25.2 % (ref 11.9–14.6)
GLUCOSE BLD STRIP.AUTO-MCNC: 231 MG/DL (ref 65–100)
GLUCOSE BLD STRIP.AUTO-MCNC: 238 MG/DL (ref 65–100)
GLUCOSE BLD STRIP.AUTO-MCNC: 250 MG/DL (ref 65–100)
GLUCOSE SERPL-MCNC: 220 MG/DL (ref 65–100)
HCT VFR BLD AUTO: 30.6 % (ref 41.1–50.3)
HGB BLD-MCNC: 9.2 G/DL (ref 13.6–17.2)
INR PPP: 2.9
MAGNESIUM SERPL-MCNC: 1.9 MG/DL (ref 1.8–2.4)
MAGNESIUM SERPL-MCNC: 1.9 MG/DL (ref 1.8–2.4)
MCH RBC QN AUTO: 23.6 PG (ref 26.1–32.9)
MCHC RBC AUTO-ENTMCNC: 30.1 G/DL (ref 31.4–35)
MCV RBC AUTO: 78.5 FL (ref 79.6–97.8)
NRBC # BLD: 0 K/UL (ref 0–0.2)
PHOSPHATE SERPL-MCNC: 2.8 MG/DL (ref 2.3–3.7)
PLATELET # BLD AUTO: 414 K/UL (ref 150–450)
PMV BLD AUTO: 10.2 FL (ref 9.4–12.3)
POTASSIUM SERPL-SCNC: 3.8 MMOL/L (ref 3.5–5.1)
PROTHROMBIN TIME: 30.1 SEC (ref 11.7–14.5)
Q-T INTERVAL, ECG07: 472 MS
QRS DURATION, ECG06: 182 MS
QTC CALCULATION (BEZET), ECG08: 583 MS
RBC # BLD AUTO: 3.9 M/UL (ref 4.23–5.6)
SERVICE CMNT-IMP: NORMAL
SODIUM SERPL-SCNC: 138 MMOL/L (ref 136–145)
VENTRICULAR RATE, ECG03: 92 BPM
WBC # BLD AUTO: 18.9 K/UL (ref 4.3–11.1)

## 2019-08-08 PROCEDURE — 80048 BASIC METABOLIC PNL TOTAL CA: CPT

## 2019-08-08 PROCEDURE — 86592 SYPHILIS TEST NON-TREP QUAL: CPT

## 2019-08-08 PROCEDURE — 77030027688 HC DRSG MEPILEX 16-48IN NO BORD MOLN -A

## 2019-08-08 PROCEDURE — 74011250636 HC RX REV CODE- 250/636: Performed by: INTERNAL MEDICINE

## 2019-08-08 PROCEDURE — 92610 EVALUATE SWALLOWING FUNCTION: CPT

## 2019-08-08 PROCEDURE — 74011250637 HC RX REV CODE- 250/637: Performed by: INTERNAL MEDICINE

## 2019-08-08 PROCEDURE — 36592 COLLECT BLOOD FROM PICC: CPT

## 2019-08-08 PROCEDURE — 77030019605

## 2019-08-08 PROCEDURE — 71045 X-RAY EXAM CHEST 1 VIEW: CPT

## 2019-08-08 PROCEDURE — 99232 SBSQ HOSP IP/OBS MODERATE 35: CPT | Performed by: INTERNAL MEDICINE

## 2019-08-08 PROCEDURE — 82962 GLUCOSE BLOOD TEST: CPT

## 2019-08-08 PROCEDURE — 74018 RADEX ABDOMEN 1 VIEW: CPT

## 2019-08-08 PROCEDURE — 83735 ASSAY OF MAGNESIUM: CPT

## 2019-08-08 PROCEDURE — 85027 COMPLETE CBC AUTOMATED: CPT

## 2019-08-08 PROCEDURE — 85610 PROTHROMBIN TIME: CPT

## 2019-08-08 PROCEDURE — 77030008031

## 2019-08-08 PROCEDURE — 74011636637 HC RX REV CODE- 636/637: Performed by: INTERNAL MEDICINE

## 2019-08-08 PROCEDURE — 84100 ASSAY OF PHOSPHORUS: CPT

## 2019-08-08 PROCEDURE — 87389 HIV-1 AG W/HIV-1&-2 AB AG IA: CPT

## 2019-08-08 RX ORDER — HYDROCODONE BITARTRATE AND ACETAMINOPHEN 5; 325 MG/1; MG/1
1 TABLET ORAL
Status: DISCONTINUED | OUTPATIENT
Start: 2019-08-08 | End: 2019-08-08 | Stop reason: HOSPADM

## 2019-08-08 RX ORDER — SODIUM CHLORIDE 0.9 % (FLUSH) 0.9 %
5-40 SYRINGE (ML) INJECTION AS NEEDED
Status: DISCONTINUED | OUTPATIENT
Start: 2019-08-08 | End: 2019-08-08 | Stop reason: HOSPADM

## 2019-08-08 RX ORDER — GUAIFENESIN 100 MG/5ML
81 LIQUID (ML) ORAL DAILY
Status: DISCONTINUED | OUTPATIENT
Start: 2019-08-09 | End: 2019-08-08 | Stop reason: HOSPADM

## 2019-08-08 RX ORDER — INSULIN LISPRO 100 [IU]/ML
INJECTION, SOLUTION INTRAVENOUS; SUBCUTANEOUS EVERY 6 HOURS
Status: DISCONTINUED | OUTPATIENT
Start: 2019-08-08 | End: 2019-08-08 | Stop reason: HOSPADM

## 2019-08-08 RX ORDER — DOCUSATE SODIUM 100 MG/1
100 CAPSULE, LIQUID FILLED ORAL 2 TIMES DAILY
Status: DISCONTINUED | OUTPATIENT
Start: 2019-08-08 | End: 2019-08-08

## 2019-08-08 RX ORDER — ATORVASTATIN CALCIUM 40 MG/1
40 TABLET, FILM COATED ORAL
Status: DISCONTINUED | OUTPATIENT
Start: 2019-08-08 | End: 2019-08-08

## 2019-08-08 RX ORDER — HYDROCODONE BITARTRATE AND ACETAMINOPHEN 5; 325 MG/1; MG/1
1 TABLET ORAL
Status: DISCONTINUED | OUTPATIENT
Start: 2019-08-08 | End: 2019-08-08

## 2019-08-08 RX ORDER — NITROGLYCERIN 0.4 MG/1
0.4 TABLET SUBLINGUAL
Status: DISCONTINUED | OUTPATIENT
Start: 2019-08-08 | End: 2019-08-08 | Stop reason: HOSPADM

## 2019-08-08 RX ORDER — VANCOMYCIN 1.75 GRAM/500 ML IN 0.9 % SODIUM CHLORIDE INTRAVENOUS
1750 EVERY 24 HOURS
Status: DISCONTINUED | OUTPATIENT
Start: 2019-08-08 | End: 2019-08-08 | Stop reason: HOSPADM

## 2019-08-08 RX ORDER — FUROSEMIDE 10 MG/ML
40 INJECTION INTRAMUSCULAR; INTRAVENOUS ONCE
Status: CANCELLED | OUTPATIENT
Start: 2019-08-08 | End: 2019-08-08

## 2019-08-08 RX ORDER — FUROSEMIDE 10 MG/ML
40 INJECTION INTRAMUSCULAR; INTRAVENOUS ONCE
Status: COMPLETED | OUTPATIENT
Start: 2019-08-08 | End: 2019-08-08

## 2019-08-08 RX ORDER — NYSTATIN 100000 [USP'U]/G
POWDER TOPICAL AS NEEDED
Status: DISCONTINUED | OUTPATIENT
Start: 2019-08-08 | End: 2019-08-08

## 2019-08-08 RX ORDER — ACETAMINOPHEN 325 MG/1
650 TABLET ORAL
Status: DISCONTINUED | OUTPATIENT
Start: 2019-08-08 | End: 2019-08-08 | Stop reason: HOSPADM

## 2019-08-08 RX ORDER — NYSTATIN 100000 [USP'U]/G
POWDER TOPICAL AS NEEDED
Status: DISCONTINUED | OUTPATIENT
Start: 2019-08-08 | End: 2019-08-08 | Stop reason: HOSPADM

## 2019-08-08 RX ORDER — VANCOMYCIN 1.75 GRAM/500 ML IN 0.9 % SODIUM CHLORIDE INTRAVENOUS
1750 EVERY 24 HOURS
Qty: 2000 ML | Refills: 0 | Status: SHIPPED
Start: 2019-08-09 | End: 2019-08-14

## 2019-08-08 RX ORDER — LIDOCAINE HYDROCHLORIDE AND EPINEPHRINE 10; 10 MG/ML; UG/ML
1.5 INJECTION, SOLUTION INFILTRATION; PERINEURAL
Status: DISCONTINUED | OUTPATIENT
Start: 2019-08-08 | End: 2019-08-08 | Stop reason: HOSPADM

## 2019-08-08 RX ORDER — DOCUSATE SODIUM 50 MG/5ML
100 LIQUID ORAL 2 TIMES DAILY
Status: DISCONTINUED | OUTPATIENT
Start: 2019-08-08 | End: 2019-08-08 | Stop reason: HOSPADM

## 2019-08-08 RX ORDER — GUAIFENESIN 100 MG/5ML
81 LIQUID (ML) ORAL DAILY
Status: DISCONTINUED | OUTPATIENT
Start: 2019-08-08 | End: 2019-08-08

## 2019-08-08 RX ORDER — FAMOTIDINE 20 MG/1
20 TABLET, FILM COATED ORAL 2 TIMES DAILY
Status: DISCONTINUED | OUTPATIENT
Start: 2019-08-08 | End: 2019-08-08

## 2019-08-08 RX ORDER — MAGNESIUM SULFATE 1 G/100ML
1 INJECTION INTRAVENOUS ONCE
Status: DISCONTINUED | OUTPATIENT
Start: 2019-08-08 | End: 2019-08-08

## 2019-08-08 RX ORDER — SODIUM CHLORIDE 0.9 % (FLUSH) 0.9 %
5-40 SYRINGE (ML) INJECTION EVERY 8 HOURS
Status: DISCONTINUED | OUTPATIENT
Start: 2019-08-08 | End: 2019-08-08

## 2019-08-08 RX ORDER — SODIUM CHLORIDE 0.9 % (FLUSH) 0.9 %
5-40 SYRINGE (ML) INJECTION EVERY 8 HOURS
Status: DISCONTINUED | OUTPATIENT
Start: 2019-08-08 | End: 2019-08-08 | Stop reason: HOSPADM

## 2019-08-08 RX ORDER — MAGNESIUM SULFATE 1 G/100ML
1 INJECTION INTRAVENOUS ONCE
Status: COMPLETED | OUTPATIENT
Start: 2019-08-08 | End: 2019-08-08

## 2019-08-08 RX ORDER — MORPHINE SULFATE 2 MG/ML
2 INJECTION, SOLUTION INTRAMUSCULAR; INTRAVENOUS
Status: DISCONTINUED | OUTPATIENT
Start: 2019-08-08 | End: 2019-08-08 | Stop reason: HOSPADM

## 2019-08-08 RX ORDER — ATORVASTATIN CALCIUM 40 MG/1
40 TABLET, FILM COATED ORAL
Status: DISCONTINUED | OUTPATIENT
Start: 2019-08-08 | End: 2019-08-08 | Stop reason: HOSPADM

## 2019-08-08 RX ORDER — MAGNESIUM SULFATE 1 G/100ML
1 INJECTION INTRAVENOUS ONCE
Status: CANCELLED | OUTPATIENT
Start: 2019-08-08 | End: 2019-08-08

## 2019-08-08 RX ORDER — FUROSEMIDE 10 MG/ML
40 INJECTION INTRAMUSCULAR; INTRAVENOUS ONCE
Status: DISCONTINUED | OUTPATIENT
Start: 2019-08-08 | End: 2019-08-08

## 2019-08-08 RX ORDER — FAMOTIDINE 20 MG/1
20 TABLET, FILM COATED ORAL 2 TIMES DAILY
Status: DISCONTINUED | OUTPATIENT
Start: 2019-08-08 | End: 2019-08-08 | Stop reason: HOSPADM

## 2019-08-08 RX ADMIN — VANCOMYCIN HYDROCHLORIDE 1750 MG: 10 INJECTION, POWDER, LYOPHILIZED, FOR SOLUTION INTRAVENOUS at 13:42

## 2019-08-08 RX ADMIN — MORPHINE SULFATE 2 MG: 2 INJECTION, SOLUTION INTRAMUSCULAR; INTRAVENOUS at 06:02

## 2019-08-08 RX ADMIN — ACETAMINOPHEN 650 MG: 325 TABLET, FILM COATED ORAL at 15:20

## 2019-08-08 RX ADMIN — Medication 3 G: at 18:09

## 2019-08-08 RX ADMIN — Medication 10 ML: at 05:59

## 2019-08-08 RX ADMIN — INSULIN LISPRO 8 UNITS: 100 INJECTION, SOLUTION INTRAVENOUS; SUBCUTANEOUS at 00:09

## 2019-08-08 RX ADMIN — Medication 10 ML: at 09:06

## 2019-08-08 RX ADMIN — Medication 3 G: at 00:08

## 2019-08-08 RX ADMIN — MAGNESIUM SULFATE HEPTAHYDRATE 1 G: 1 INJECTION, SOLUTION INTRAVENOUS at 09:05

## 2019-08-08 RX ADMIN — FUROSEMIDE 40 MG: 10 INJECTION, SOLUTION INTRAMUSCULAR; INTRAVENOUS at 09:06

## 2019-08-08 RX ADMIN — FAMOTIDINE 20 MG: 20 TABLET ORAL at 18:09

## 2019-08-08 RX ADMIN — MORPHINE SULFATE 2 MG: 2 INJECTION, SOLUTION INTRAMUSCULAR; INTRAVENOUS at 00:00

## 2019-08-08 RX ADMIN — MORPHINE SULFATE 2 MG: 2 INJECTION, SOLUTION INTRAMUSCULAR; INTRAVENOUS at 13:35

## 2019-08-08 RX ADMIN — DOCUSATE SODIUM 100 MG: 50 LIQUID ORAL at 18:09

## 2019-08-08 RX ADMIN — INSULIN LISPRO 4 UNITS: 100 INJECTION, SOLUTION INTRAVENOUS; SUBCUTANEOUS at 05:59

## 2019-08-08 RX ADMIN — INSULIN LISPRO 4 UNITS: 100 INJECTION, SOLUTION INTRAVENOUS; SUBCUTANEOUS at 18:09

## 2019-08-08 RX ADMIN — Medication 3 G: at 09:05

## 2019-08-08 RX ADMIN — INSULIN LISPRO 6 UNITS: 100 INJECTION, SOLUTION INTRAVENOUS; SUBCUTANEOUS at 12:19

## 2019-08-08 RX ADMIN — Medication 10 ML: at 14:00

## 2019-08-08 NOTE — PROGRESS NOTES
Bedside shift change report given to Jayme Licea RN (oncoming nurse) by Amara Suarez RN (offgoing nurse). Report included the following information SBAR, Kardex, Procedure Summary, Intake/Output, MAR, Accordion, Recent Results, Med Rec Status and Cardiac Rhythm paced with PVCs.

## 2019-08-08 NOTE — PROGRESS NOTES
DISCHARGE SUMMARY from Nurse TRANSFER - OUT REPORT: 
 
Verbal report given to [facility] RN(name) on Puja Dallas  being transferred to [facility]) for Discharge plan. Report consisted of patients Situation, Background, Assessment and  
Recommendations(SBAR). Information from the following report(s) SBAR, Kardex, Procedure Summary, Intake/Output, MAR, Accordion, Recent Results, Med Rec Status and Cardiac Rhythm afib/paced/pvc was reviewed with the receiving nurse. Lines:  
Peripheral IV Posterior;Right Hand (Active) Site Assessment Clean, dry, & intact 8/8/2019  7:11 AM  
Phlebitis Assessment 0 8/8/2019  7:11 AM  
Infiltration Assessment 0 8/8/2019  7:11 AM  
Dressing Status Clean, dry, & intact 8/8/2019  7:11 AM  
Dressing Type Transparent;Tape 8/8/2019  7:11 AM  
Hub Color/Line Status Pink;Flushed;Capped 8/8/2019  7:11 AM  
  
 
Opportunity for questions and clarification was provided. Transport scheduled for [facility]. PATIENT INSTRUCTIONS: 
 
What to do at Home: 
Recommended activity: per [facility] *  Please give a list of your current medications to your Primary Care Provider. *  Please update this list whenever your medications are discontinued, doses are 
    changed, or new medications (including over-the-counter products) are added. *  Please carry medication information at all times in case of emergency situations. These are general instructions for a healthy lifestyle: No smoking/ No tobacco products/ Avoid exposure to second hand smoke Surgeon General's Warning:  Quitting smoking now greatly reduces serious risk to your health. Obesity, smoking, and sedentary lifestyle greatly increases your risk for illness A healthy diet, regular physical exercise & weight monitoring are important for maintaining a healthy lifestyle You may be retaining fluid if you have a history of heart failure or if you experience any of the following symptoms:  Weight gain of 3 pounds or more overnight or 5 pounds in a week, increased swelling in our hands or feet or shortness of breath while lying flat in bed. Please call your doctor as soon as you notice any of these symptoms; do not wait until your next office visit. The discharge information has been reviewed with the patient and spouse. The patient and spouse verbalized understanding. Discharge medications reviewed with the patient and spouse and appropriate educational materials and side effects teaching were provided. ___________________________________________________________________________________________________________________________________

## 2019-08-08 NOTE — MANAGEMENT PLAN
I spoke to the medical director of [facility] at number [phone] who stated the patient is not a candidate for a LTACH and he must remain admitted to an acute care facility or can be considered to be transferred to a skilled nursing facility. His medical situation is complex, however, he no longer has acute or ongoing issues managed by cardiology. We will discuss with the medicine vs critical care teams for primary management of his ongoing inpatient needs. Thanks to all involved in his complex situation. Diego Perdomo. Keya Moore MD, MS Clinical Cardiac Electrophysiology [facility] [phone]

## 2019-08-08 NOTE — PROGRESS NOTES
Initial visit was made, prayer, emotional support and a spiritual presence were provided.  card was left with the patient. His wife, Quincy Marshall was with him. Luek Espinosa

## 2019-08-08 NOTE — PROGRESS NOTES
Problem: Dysphagia (Adult) Goal: *Speech Goal: (INSERT TEXT) Description LTG: Patient will tolerate least restrictive diet without overt signs or symptoms of airway compromise. STG: Patient will tolerate PO trials with SLP only without overt signs or symptoms of airway compromise. STG: Patient will participate in modified barium swallow study as clinically indicated. Outcome: Progressing Towards Goal 
  
SPEECH LANGUAGE PATHOLOGY: DYSPHAGIA- Initial Assessment NAME/AGE/GENDER: Shakir Cole is a 68 y.o. male DATE: 8/8/2019 PRIMARY DIAGNOSIS: Arrhythmia [I49.9] ICD-10: Treatment Diagnosis: R13.13 Dysphagia, Pharyngeal Phase INTERDISCIPLINARY COLLABORATION: Registered Nurse PRECAUTIONS/ALLERGIES: Patient has no known allergies. SUBJECTIVE Patient pleasant, wanting water. RN reports extubated last night. NGT in place, set to section. Wife present throughout assessment. Diet Prior to Evaluation: regular textures, thin liquids History of Present Injury/Illness: Mr. Ezio Villegas  has a past medical history of Anxiety, Arthritis, Atrial flutter ([address] (2/3/2016), CAD (coronary artery disease), Cardiomyopathy ([address] Chronic systolic heart failure ([address] (10/20/2010), CKD (chronic kidney disease) stage 3, GFR 30-59 ml/min ([address] (10/18/2010), Diabetic peripheral neuropathy ([address] (9/15/2015), Dyslipidemia (10/18/2010), Edema, Essential hypertension, benign (09/15/2015), H/O falling, Insomnia (9/15/2015), Mixed hyperlipidemia (9/15/2015), Morbid obesity ([address] (10/18/2010), Nephrolithiasis, Oxygen dependent, PAD (peripheral artery disease) ([address] Peripheral neuropathy, Pneumonia (07/04/2019), Renal insufficiency, Right heart failure ([address] S/P CABG (coronary artery bypass graft), Sleep apnea, and Type II or unspecified type diabetes mellitus without mention of complication, uncontrolled ([address] (09/15/2015). Pegantoniaann Gang  He also  has a past surgical history that includes hx urological; hx cataract removal (Bilateral); pr cardiac surg procedure unlist; hx angioplasty; and hx heart catheterization. Previous Dysphagia: NONE REPORTED Problem List:  (Impairments causing functional limitations): 1. Dysphagia, Recent extubation Orientation:  
Person Pain: Pain Scale 1: Numeric (0 - 10) Pain Intensity 1: 0 Pain Location 1: Back; Shoulder Pain Intervention(s) 1: Medication (see MAR) OBJECTIVE Oral Motor Assessment: 
· Labial: Impaired coordination · Dentition: Natural 
· Oral Hygiene: Adequate · Lingual: Impaired coordination Swallow assessment: 
 Patient presented with ice chip, thin liquid presented by spoon and nectar liquid presented by spoon. Swallows timely, but with strong cough response and decrease in O2 satus after swallow of tsp of thin liquids. Patient had throat clearing and complaints of stasis with swallows of ice chip and nectar liquids presented by tsp. No further consistencies presented. ASSESSMENT Patient presents with severe pharyngeal dysphagia with odynophagia and signs/symptoms of aspiration. Limited assessment at this time. Recommend continue NPO. Will reassess swallow function in AM once greater than 24 hours post extubation. Tool Used: Dysphagia Outcome and Severity Scale (JUAN) Score Comments Normal Diet  [] 7 With no strategies or extra time needed Functional Swallow  [] 6 May have mild oral or pharyngeal delay Mild Dysphagia  [] 5 Which may require one diet consistency restricted Mild-Moderate Dysphagia  [] 4 With 1-2 diet consistencies restricted Moderate Dysphagia  [] 3 With 2 or more diet consistencies restricted Moderate-Severe Dysphagia  [] 2 With partial PO strategies (trials with ST only) Severe Dysphagia  [] 1 With inability to tolerate any PO safely Score:  Initial: 2 Most Recent: 2 (Date 08/08/19 ) Interpretation of Tool: The Dysphagia Outcome and Severity Scale (JUAN) is a simple, easy-to-use, 7-point scale developed to systematically rate the functional severity of dysphagia based on objective assessment and make recommendations for diet level, independence level, and type of nutrition. Current Medications:  
Current Facility-Administered Medications on File Prior to Encounter Medication Dose Route Frequency Provider Last Rate Last Dose  [COMPLETED] ceFAZolin (ANCEF) 3 g/30 mL in sterile water IV syringe  3 g IntraVENous ON Catarino Thompson MD   3 g at 08/07/19 1600  [COMPLETED] bacitracin 50,000 Units, neomycin-polymyxin B  2 mL in sterile water irrigation 1,000 mL irrigation   Irrigation ONCE Renzo Dooley MD      
 sodium chloride (NS) flush 5-40 mL  5-40 mL IntraVENous Q8H Matilde Coats NP      
 sodium chloride (NS) flush 5-40 mL  5-40 mL IntraVENous PRN Jimena BEY NP      
 [DISCONTINUED] lidocaine-EPINEPHrine (XYLOCAINE) 1 %-1:100,000 injection 15 mg  1.5 mL IntraDERMal Multiple Renzo Dooley MD   15 mg at 08/07/19 1643  [DISCONTINUED] aspirin chewable tablet 81 mg  81 mg Oral DAILY Ruben Ross NP      
 [DISCONTINUED] nitroglycerin (NITROSTAT) tablet 0.4 mg  0.4 mg SubLINGual Q5MIN PRN Ruben Ross NP      
 [DISCONTINUED] morphine injection 2 mg  2 mg IntraVENous Q4H PRN Ruben Ross NP      
 [DISCONTINUED] acetaminophen (TYLENOL) tablet 650 mg  650 mg Oral Q4H PRN Ruben Ross NP      
 [DISCONTINUED] vancomycin (VANCOCIN) 1750 mg in  ml infusion  1,750 mg IntraVENous Q24H Jimena BEY NP      
 [DISCONTINUED] docusate sodium (COLACE) capsule 100 mg  100 mg Oral BID Ruben Ross NP      
 [DISCONTINUED] insulin lispro (HUMALOG) injection   SubCUTAneous Q6H Jimena BEY NP      
 [DISCONTINUED] famotidine (PEPCID) tablet 20 mg  20 mg Oral BID Ruben Ross NP      
  [DISCONTINUED] traMADol (ULTRAM) tablet 50 mg  50 mg Oral Q6H PRN Vanita Clark NP      
 [DISCONTINUED] atorvastatin (LIPITOR) tablet 40 mg  40 mg Oral QHS Tonya BEY NP      
 [DISCONTINUED] isoproterenol (ISUPREL) 0.2 mg in 0.9% sodium chloride 50 mL infusion   IntraVENous CONTINUOUS Aury Valencia CRNA 90 mL/hr at 08/07/19 1551 6 mcg/min at 08/07/19 1551  [DISCONTINUED] rocuronium injection   IntraVENous PRN Aury Valencia CRNA   20 mg at 08/07/19 1647  [DISCONTINUED] 0.9% sodium chloride infusion    CONTINUOUS Sergio Wilkerson CRNA Current Outpatient Medications on File Prior to Encounter Medication Sig Dispense Refill  ferrous sulfate 325 mg (65 mg iron) tablet Take 1 Tab by mouth two (2) times daily (with meals) for 30 days. 60 Tab 0  
 ammonium lactate (LAC-HYDRIN) 12 % lotion Apply  to affected area nightly. rub in to affected area well BLE    
 albuterol-ipratropium (DUO-NEB) 2.5 mg-0.5 mg/3 ml nebu 3 mL by Nebulization route four (4) times daily.  Oxygen Indications: 3 L via NC  warfarin (COUMADIN) 2.5 mg tablet Take 2.5 mg by mouth every Tuesday, Thursday, Saturday & Sunday.  HUMALOG U-100 INSULIN 100 unit/mL injection by SubCUTAneous route. Indications: sliding scale  simethicone (MYLICON) 80 mg chewable tablet Take 1 Tab by mouth four (4) times daily as needed for Flatulence. 12 Tab 0  
 allopurinol (ZYLOPRIM) 100 mg tablet Take 100 mg by mouth daily.  warfarin (COUMADIN) 5 mg tablet Take 5 mg by mouth every Monday, Wednesday, Friday.  atorvastatin (LIPITOR) 40 mg tablet 1 every day for cholesterol (replaces simvastatin) (Patient taking differently: Take 40 mg by mouth nightly.  Indications: combined high blood cholesterol and triglyceride level) 90 Tab 12  
 azelastine (ASTELIN) 137 mcg (0.1 %) nasal spray Use in each nostril bid (Patient taking differently: 2 Sprays by Both Nostrils route two (2) times a day.) 3 Bottle 9  
 azelastine (OPTIVAR) 0.05 % ophthalmic solution Use in affected eye bid to tid for allergies (Patient taking differently: Administer 1 Drop to both eyes three (3) times daily.) 6 mL 12  
 Insulin Syringe-Needle U-100 (BD INSULIN SYRINGE) 1 mL 28 gauge x 1/2\" syrg Use 5 x a day. Dx E11.65  Indications: 5x a day 500 Syringe 12  
 glucose blood VI test strips (ONETOUCH ULTRA TEST) strip Use bid to tid   DX: E 11.65   Disp with lancets of formulary  Indications: dispense with lancets 300 Strip 12  
 bumetanide (BUMEX) 1 mg tablet 1 to 2 every day for fluid (Patient taking differently: Take 2 mg by mouth daily. 1 to 2 every day for fluid) 60 Tab 12  DULoxetine (CYMBALTA) 60 mg capsule Take 1 Cap by mouth daily. Indications: ANXIETY WITH DEPRESSION, NEUROPATHIC PAIN 90 Cap 12  
 lancets (ONETOUCH DELICA LANCETS) 30 gauge misc Use up to tid   Dx E11.65 300 Lancet 12  Blood-Glucose Meter (ONETOUCH ULTRA2) monitoring kit Use bid to tid   DX E11.65  If another product preferred on formulary please let me know which to select from. jlb 1 Kit 12  
 aspirin 81 mg chewable tablet Take 1 Tab by mouth daily. 30 Tab 0  
 polyethylene glycol (MIRALAX) 17 gram packet Take 1 Packet by mouth daily. 1 Each 3  
 hydrocortisone (ANUSOL-HC) 2.5 % rectal cream Insert  into rectum four (4) times daily. (Patient taking differently: Insert  into rectum four (4) times daily as needed.) 30 g 0  
 nitroglycerin (NITROSTAT) 0.4 mg SL tablet 1 Tab by SubLINGual route as needed for Chest Pain. 25 Bottle 11  
 fluticasone (FLONASE) 50 mcg/actuation nasal spray 1 spray IEN every day to BID (Patient taking differently: 1 Spray by Both Nostrils route two (2) times a day.) 3 Bottle 9  
 
 
 
PLAN   
FREQUENCY/DURATION: Continue to follow patient 3 times a week for duration of hospital stay to address above goals.  
 
- Recommendations for next treatment session: Next treatment will address reassessment of swallow function REHABILITATION POTENTIAL FOR STATED GOALS: Good COMPLIANCE WITH PROGRAM/EXERCISES: Will assess as treatment progresses CONTINUATION OF SKILLED SERVICES/MEDICAL NECESSITY: 
? Patient is expected to demonstrate progress in  swallow function and swallow safety in order to  improve swallow safety, work toward diet advancement and decrease aspiration risk. ? Patient continues to require skilled intervention due to dysphagia. RECOMMENDATIONS  
DIET:  
? NPO 
 
MEDICATIONS: Non-oral 
  
ASPIRATION PRECAUTIONS · To be determined once PO diet initiated COMPENSATORY STRATEGIES/MODIFICATIONS · To be determined once PO diet initiated EDUCATION: 
· Recommendations discussed with Nursing · Family · Patient RECOMMENDATIONS for CONTINUED SPEECH THERAPY:  
YES: Anticipate need for ongoing speech therapy during this hospitalization at next level of care. SAFETY: 
After treatment position/precautions: · Upright in bed · RN notified · family at bedside Total Treatment Duration:  
Time In: 4358 Time Out: 0183 Verenice Maravilla MA, CCC-SLP

## 2019-08-08 NOTE — PROGRESS NOTES
[facility] asking for MD number. Contacted ANTONIO Ram and number given to nusrat Prabhakar. Aware MD going into procedure soon. Need to call ASAP.

## 2019-08-08 NOTE — DISCHARGE SUMMARY
Hospitalist Discharge Summary Patient ID: 
Hailey Mckenna [id number] 
18 y.o. 
1950 Admit date: 8/7/2019  5:40 PM 
Discharge date and time: 8/8/2019 Attending: Irena Vanegas MD 
PCP:  El Hurd MD 
Treatment Team: Attending Provider: Irena Vanegas MD; Consulting Provider: Isacc Reyes MD; Charge Nurse: Sameera Reynaga, RN; Utilization Review: Veronica Klinefelter, RN; Care Manager: Tai Mobley RN; Consulting Provider: Alexus Olmstead MD; Consulting Provider: Jordana Manuel MD 
 
Principal Diagnosis <principal problem not specified> Active Problems: Morbid obesity ([address] (10/18/2010) Diabetes mellitus type 2, controlled ([address] (12/15/2015) Atrial flutter ([address] (2/3/2016) JASON (obstructive sleep apnea) (4/7/2016) Hypoxia (5/6/2016) Debility (11/14/2016) Acute metabolic encephalopathy (6/92/3880) V-tach ([address] (8/7/2019) Arrhythmia (8/8/2019) Torsades de pointes ([address] (8/8/2019) Patient is a 72 y. o. male who presented to the ED at [facility] for cc left arm pain. Patient found to have a closed displaced transverse shaft of the left humerus. S/p surgical repair of left humerus on 7/22/19. Hx significant for anxiety, DM type II, gout, insomnia, monoclonal gammopathy of undetermined significance followed by [facility], atrial flutter on coumadin, HLD, HTN, dementia, and CHF with EF 50% in 2016. Patient underwent surgery with orthopedic surgery at [facility] and his hospitalization was complicated by post-operative metabolic encephalopathy. Further workup reviewed bilateral buttock/sacral pressure wounds as well as MRSA with confusion. Patient was discharged to [facility] where patient's wife states that he continued to improve overall.  Patient was observed to be in [facility] while in [facility] and he was emergently transferred to the ICU on 8/5 where he was emergently intubated and started on Isuprel gtt for prolonged QT interval.  
 
Interval History (8/8): patient examined at bedside. Feeling better overall. Patient's wife at bedside. No active pain right now. Failed bedside swallow this morning, still has NG tube in place which is actively draining. No fevers/chills, chest pain, or shortness of breath. Wife states that his mentation seems to be improving. Hospital Course: 
Please refer to the admission H&P for details of presentation. In summary, the patient is admitted to the CCU and started on Isuprel gtt due to [address] secondary to prolonged QT interval. EP consulted and patient underwent PPM insertion. Upon transfer, patient was electively intubated emergently, pulmonary consulted and followed for ventilator management. Patient successfully extubated and weaned to nasal canula. He has continued to have persistent leukocytosis of uncertain etiology, ID has been following with recommendations to continue empiric vancomycin pending sepsis workup. Pulmonary recommending repeat CXR tomorrow, continues to follow with further assessment of his hypoxia, he is supposed to be on BiPAP at nighttime. Patient with NG tube in place, failed bedside swallow test this morning. Patient approved for transfer back to [facility] this evening. He can restart his warfarin as well. Discussed with wife and patient at bedside. All questions answered. Significant Diagnostic Studies:  
 
 
Labs: Results:  
   
Chemistry Recent Labs 08/08/19 
5038 08/07/19 
5654 08/06/19 
9675 08/05/19 2230 * 312* 160* 214*  137 139 137  
K 3.8 4.1 4.2 4.9  
 104 103 101 CO2 28 22 28 27 BUN 27* 29* 26* 23  
CREA 1.21 1.43 1.48 1.29  
CA 7.9* 7.7* 8.5 8.1* AGAP 6* 11 8 9 AP  --   --  209* 232* TP  --   --  6.4 7.2 ALB  --   --  1.9* 2.1*  
GLOB  --   --  4.5* 5.1* AGRAT  --   --  0.4* 0.4* CBC w/Diff Recent Labs 08/08/19 
1126 08/07/19 
6823 08/06/19 
0003 08/05/19 2230 WBC 18.9* 17.8* 21.5* 23.1*  
RBC 3.90* 3.54* 4.24 4.21* HGB 9.2* 8.5* 9.8* 9.9*  
HCT 30.6* 28.8* 33.4* 32.8*  380 462* 421 GRANS  --  84* 84* 75  
LYMPH  --  5* 7* 14 EOS  --  0* 1 1 Cardiac Enzymes Recent Labs 08/05/19 
2230  Coagulation Recent Labs 08/08/19 
9736 08/07/19 
5924 PTP 30.1* 27.8* INR 2.9 2.6 Lipid Panel Lab Results Component Value Date/Time Cholesterol, total 109 09/28/2018 08:02 AM  
 HDL Cholesterol 23 (L) 09/28/2018 08:02 AM  
 LDL, calculated 29 09/28/2018 08:02 AM  
 VLDL, calculated 57 (H) 09/28/2018 08:02 AM  
 Triglyceride 285 (H) 09/28/2018 08:02 AM  
 CHOL/HDL Ratio 4.9 10/19/2010 04:44 AM  
  
BNP No results for input(s): BNPP in the last 72 hours. Liver Enzymes Recent Labs 08/06/19 
1370 TP 6.4 ALB 1.9* * SGOT 48* Thyroid Studies Lab Results Component Value Date/Time TSH 1.910 06/25/2019 11:11 AM  
    
 
 
Discharge Exam: 
Visit Vitals /43 Pulse 90 Temp 98.1 °F (36.7 °C) Resp 25 Wt 143 kg (315 lb 4.1 oz) SpO2 99% BMI 42.76 kg/m² General appearance: alert, cooperative, no distress, appears stated age Lungs: distant breath sounds, no crackles or wheezes appreciated Heart: regular rate and rhythm, S1, S2 normal, no murmur, click, rub or gallop, paced rhythm Abdomen: soft, non-tender. Bowel sounds normal. No masses,  no organomegaly. NG tube in place Skin: no erythema or pain to palpation along site of insertion of PM 
Extremities: no cyanosis or edema Neurologic: Grossly normal 
 
Disposition: [employer] Discharge Condition: stable Patient Instructions:  
Current Discharge Medication List  
  
START taking these medications Details  
vancomycin in 0.9% Sodium Cl (VANCOCIN) 1.75 gram/500 mL soln 500 mL by IntraVENous route every twenty-four (24) hours for 5 days. Qty: 2000 mL, Refills: 0 CONTINUE these medications which have NOT CHANGED Details ferrous sulfate 325 mg (65 mg iron) tablet Take 1 Tab by mouth two (2) times daily (with meals) for 30 days. Qty: 60 Tab, Refills: 0  
  
ammonium lactate (LAC-HYDRIN) 12 % lotion Apply  to affected area nightly. rub in to affected area well BLE  
  
albuterol-ipratropium (DUO-NEB) 2.5 mg-0.5 mg/3 ml nebu 3 mL by Nebulization route four (4) times daily. Oxygen Indications: 3 L via NC  
  
!! warfarin (COUMADIN) 2.5 mg tablet Take 2.5 mg by mouth every Tuesday, Thursday, Saturday & Sunday. HUMALOG U-100 INSULIN 100 unit/mL injection by SubCUTAneous route. Indications: sliding scale  
  
simethicone (MYLICON) 80 mg chewable tablet Take 1 Tab by mouth four (4) times daily as needed for Flatulence. Qty: 12 Tab, Refills: 0  
  
allopurinol (ZYLOPRIM) 100 mg tablet Take 100 mg by mouth daily. !! warfarin (COUMADIN) 5 mg tablet Take 5 mg by mouth every Monday, Wednesday, Friday. atorvastatin (LIPITOR) 40 mg tablet 1 every day for cholesterol (replaces simvastatin) Qty: 90 Tab, Refills: 12 Insulin Syringe-Needle U-100 (BD INSULIN SYRINGE) 1 mL 28 gauge x 1/2\" syrg Use 5 x a day. Dx E11.65  Indications: 5x a day 
Qty: 500 Syringe, Refills: 12  
 Associated Diagnoses: Controlled type 2 diabetes mellitus with diabetic polyneuropathy, with long-term current use of insulin ([address] glucose blood VI test strips (ONETOUCH ULTRA TEST) strip Use bid to tid   DX: E 11.65   Disp with lancets of formulary  Indications: dispense with lancets Qty: 300 Strip, Refills: 12  
 Associated Diagnoses: Controlled type 2 diabetes mellitus with diabetic polyneuropathy, with long-term current use of insulin ([facility]) bumetanide (BUMEX) 1 mg tablet 1 to 2 every day for fluid 
Qty: 60 Tab, Refills: 12  
 Associated Diagnoses: Chronic systolic heart failure ([address] Essential hypertension, benign DULoxetine (CYMBALTA) 60 mg capsule Take 1 Cap by mouth daily.  Indications: ANXIETY WITH DEPRESSION, NEUROPATHIC PAIN 
 Qty: 90 Cap, Refills: 12  
 Associated Diagnoses: Depression, unspecified depression type  
  
lancets (ONETOUCH DELICA LANCETS) 30 gauge misc Use up to tid   Dx E11.65 Qty: 300 Lancet, Refills: 12  
 Associated Diagnoses: Controlled type 2 diabetes mellitus with diabetic polyneuropathy, with long-term current use of insulin ([facility]) Blood-Glucose Meter (ONETOUCH ULTRA2) monitoring kit Use bid to tid   DX E11.65  If another product preferred on formulary please let me know which to select from. claudia Qty: 1 Kit, Refills: 12  
 Associated Diagnoses: Controlled type 2 diabetes mellitus with diabetic polyneuropathy, with long-term current use of insulin ([address] aspirin 81 mg chewable tablet Take 1 Tab by mouth daily. Qty: 30 Tab, Refills: 0 Associated Diagnoses: Chronic systolic heart failure (HCC)  
  
polyethylene glycol (MIRALAX) 17 gram packet Take 1 Packet by mouth daily. Qty: 1 Each, Refills: 3 Associated Diagnoses: Chronic systolic heart failure (HCC)  
  
hydrocortisone (ANUSOL-HC) 2.5 % rectal cream Insert  into rectum four (4) times daily. Qty: 30 g, Refills: 0  
  
nitroglycerin (NITROSTAT) 0.4 mg SL tablet 1 Tab by SubLINGual route as needed for Chest Pain. Qty: 25 Bottle, Refills: 11  
  
fluticasone (FLONASE) 50 mcg/actuation nasal spray 1 spray IEN every day to BID Qty: 3 Bottle, Refills: 9  
  
 !! - Potential duplicate medications found. Please discuss with provider. STOP taking these medications  
  
 azelastine (ASTELIN) 137 mcg (0.1 %) nasal spray Comments:  
Reason for Stopping:   
   
 azelastine (OPTIVAR) 0.05 % ophthalmic solution Comments:  
Reason for Stopping:   
   
  
 
 
Activity: Activity as tolerated per [facility]/PT/OT Diet: Resume previous diet Wound Care: As directed Follow-up ·   [facility] Time spent to discharge patient 35 minutes Signed: Milla Carrington DO 
8/8/2019 
5:06 PM

## 2019-08-08 NOTE — PROGRESS NOTES
Infectious Disease Progress Note Today's Date: 2019 Admit Date: 2019 Impression:  
· Leukocytosis · VT cardiac arrest 
· S/p Pacemaker placement, 19 · Hypoxic respiratory failure · MRSA bacteriuria · Encephalopathy with balance problems - thought to be due to polypharmacy · S/p L humeral ORIF, 19 · Post-operative hypovolemic shock requiring blood transfusion · Atrial fibrillation · MGUS 
· Obesity · CAD Plan:  
· Continue vancomycin for now. Suspect leukocytosis is primarily due to cardiac arrest and inflammatory response. · Send HIV and RPR to complete encephalopathy workup. · ID will continue to follow at [facility]. Anti-infectives: · IV vancomycin Subjective:  
Date of Consultation:  2019 Referring Physician: Dr. Boston Grimes Extubated, alert and responsive; per wife mental status is improved; wants water No Known Allergies Review of Systems:  A comprehensive review of systems was negative except for that written in the History of Present Illness. Objective:  
 
Visit Vitals /47 Pulse 91 Temp 98.6 °F (37 °C) Resp 21 Wt 143 kg (315 lb 4.1 oz) SpO2 99% BMI 42.76 kg/m² Temp (24hrs), Av °F (37.2 °C), Min:98 °F (36.7 °C), Max:100.1 °F (37.8 °C) Lines:  Midline:   R arm midline Physical Exam:   
General:  No acute distress. Eyes:  Sclera anicteric. Mouth/Throat: OP clear; NGT in place; Neck: Supple. Lungs:   Clear to auscultation anteriorly; diminished posteriorly;  
Cardiovascular:  Regular rate and rhythm, systolic flow murmur noted Abdomen:   Soft, non-tender, bowel sounds normal, non-distended. Extremities: No cyanosis; 1+ edema Skin: Some excoriations noted on feet;  
Lymph Nodes: Musculoskeletal:  No swelling or deformity. Lines/Devices:  Intact, no erythema, drainage, or tenderness. Psychiatric: Alert and responsive;  
 
 
 
Data Review: CBC: 
Recent Labs 19 8591 19 
6832 19 
8753 WBC 18.9* 17.8* 21.5* GRANS  --  84* 84* MONOS  --  9 8 EOS  --  0* 1 ANEU  --  15.0* 18.0* ABL  --  0.9 1.5 HGB 9.2* 8.5* 9.8* HCT 30.6* 28.8* 33.4*  
 380 462* BMP: 
Recent Labs 19 
1830 19 
4134 19 
8942 CREA 1.21 1.43 1.48  
BUN 27* 29* 26*  137 139  
K 3.8 4.1 4.2  104 103 CO2 28 22 28 AGAP 6* 11 8 * 312* 160* LFTS: 
Recent Labs 19 
7918 19 
2230 TBILI 0.6 0.7 ALT 17 20 SGOT 48* 70* * 232* TP 6.4 7.2 ALB 1.9* 2.1* Microbiology:  
 
All Micro Results None Imagin/7/19 CXR: FINDINGS:  AP semi-erect image demonstrates no confluent infiltrate or 
significant pleural fluid. Endotracheal tube tip lies at the T2-3 level. The 
heart remains enlarged without definite pneumothorax. There are other overlying 
radiopaque support devices. Signed By: Darius Frazier MD   
 2019

## 2019-08-08 NOTE — PROGRESS NOTES
Awaiting approval from insurance company to admit patient back to [address] [place] [address] [address] with Dr. Nile Amos who graciously agreed to assume care on patient for ongoing medical care. Dr. Eliu Hu notified. Remote telemetry order placed.   
 
Kannan Altman NP 
12:50 PM

## 2019-08-08 NOTE — PROGRESS NOTES
Bedside shift change report given to [phone] (oncoming nurse) by Nishi Rojas RN (offgoing nurse). Report included the following information SBAR, Kardex, ED Summary, OR Summary, Procedure Summary, Intake/Output, MAR, Accordion, Recent Results, Med Rec Status, Cardiac Rhythm paced with PVCs and Alarm Parameters .

## 2019-08-08 NOTE — PROGRESS NOTES
Staples removed from left shoulder surgery per Dr. Santana Su. Patient tolerated well. Incision site well approximated.

## 2019-08-08 NOTE — PROGRESS NOTES
[facility] CARDIOLOGY PROGRESS NOTE 
      
 
8/8/2019 12:43 PM 
 
Admit Date: 8/7/2019 Admit Diagnosis: Arrhythmia [I49.9] Assessment:  
Active Problems: 
  Arrhythmia (8/8/2019) S/p DCPM 
  Torsades zuleyma pointes Chronic medical illness Metabolic encephalopathy Fracture of humerus Transfer from an [facility] Syncope Atrial fibrillation Plan: 1. TdP - s/p SCPM, HR now stabilized. Post op with stable device parameters and CXR. 2. Chronic medical illness - he is no longer eligible for the [facility]; will transfer his care to the medical service and transfer out of the ICU. 3. Mechanical ventilation - s/p extubation. Thank you for allowing me to participate in the electrophysiologic care of this most pleasant patient. Please feel free to contact me if there are any questions or concerns. Yaritza Bruner. Elena Lopez MD, MS Clinical Cardiac Electrophysiology [facility] Cardiology Subjective: No complaints this AM, no chest pain or shortness of breath, confused. Interval History: (History of pertinent interval events obtained from nursing staff) ROS: 
GEN:  No fever or chills Cardiovascular:  As noted above Pulmonary:  As noted above Neuro:  No new focal motor or sensory loss Objective:  
 
Vitals:  
 08/08/19 1029 08/08/19 1059 08/08/19 1130 08/08/19 1159 BP: 125/60 126/58 116/52 124/58 Pulse: 90 90 99 91 Resp: 20 18 20 22 Temp:    98.9 °F (37.2 °C) SpO2: 100% 100% 100% 100% Weight:      
 
 
Physical Exam: 
Evette Hollering, Well Nourished, No Acute Distress, Alert & Oriented x 3, appropriate mood. Neck- supple, no JVD 
CV- regular rate and rhythm no MRG, left sided CIED site C/D/I. Lung- clear bilaterally Abd- soft, nontender, nondistended Ext- no edema bilaterally. Skin- warm and dry Current Facility-Administered Medications Medication Dose Route Frequency  nystatin (MYCOSTATIN) 100,000 unit/gram powder   Topical PRN  
  aspirin chewable tablet 81 mg  81 mg Oral DAILY  atorvastatin (LIPITOR) tablet 40 mg  40 mg Oral QHS  ceFAZolin (ANCEF) 3 g/30 mL in sterile water IV syringe  3 g IntraVENous Q8H  
 docusate sodium (COLACE) capsule 100 mg  100 mg Oral BID  famotidine (PEPCID) tablet 20 mg  20 mg Oral BID  
 HYDROcodone-acetaminophen (NORCO) 5-325 mg per tablet 1 Tab  1 Tab Oral Q4H PRN  
 insulin lispro (HUMALOG) injection   SubCUTAneous Q6H  
 lidocaine-EPINEPHrine (XYLOCAINE) 1 %-1:100,000 injection 15 mg  1.5 mL IntraDERMal Multiple  morphine injection 2 mg  2 mg IntraVENous Q4H PRN  
 sodium chloride (NS) flush 5-40 mL  5-40 mL IntraVENous PRN  
 nitroglycerin (NITROSTAT) tablet 0.4 mg  0.4 mg SubLINGual Q5MIN PRN  
 sodium chloride (NS) flush 5-40 mL  5-40 mL IntraVENous PRN  
 sodium chloride (NS) flush 5-40 mL  5-40 mL IntraVENous Q8H  
 vancomycin (VANCOCIN) 1750 mg in  ml infusion  1,750 mg IntraVENous Q24H  
 sodium chloride (NS) flush 5-40 mL  5-40 mL IntraVENous PRN  
 acetaminophen (TYLENOL) tablet 650 mg  650 mg Oral Q4H PRN  
 HYDROcodone-acetaminophen (NORCO) 5-325 mg per tablet 1 Tab  1 Tab Oral Q4H PRN Facility-Administered Medications Ordered in Other Encounters Medication Dose Route Frequency  sodium chloride (NS) flush 5-40 mL  5-40 mL IntraVENous Q8H  
 sodium chloride (NS) flush 5-40 mL  5-40 mL IntraVENous PRN Data Review:  
Recent Results (from the past 24 hour(s)) POC G3 Collection Time: 08/07/19  6:24 PM  
Result Value Ref Range Device: VENT    
 FIO2 (POC) 40 % pH (POC) 7.337 (L) 7.35 - 7.45    
 pCO2 (POC) 42.2 35 - 45 MMHG  
 pO2 (POC) 105 (H) 75 - 100 MMHG  
 HCO3 (POC) 22.6 22 - 26 MMOL/L  
 sO2 (POC) 98 95 - 98 % Base deficit (POC) 3 mmol/L Mode ASSIST CONTROL Set Rate 16 bpm  
 PEEP/CPAP (POC) 10 cmH2O Allens test (POC) NOT APPLICABLE Site RIGHT BRACHIAL Patient temp. 98.6  Specimen type (POC) ARTERIAL    
 Performed by Alex   
 CO2, POC 24 MMOL/L Pressure control 18 Respiratory comment: NurseNotified Exhaled minute volume 2.70 L/min COLLECT TIME 1,820 EKG, 12 LEAD, INITIAL Collection Time: 08/07/19  7:02 PM  
Result Value Ref Range Ventricular Rate 92 BPM  
 Atrial Rate 89 BPM  
 QRS Duration 182 ms Q-T Interval 472 ms QTC Calculation (Bezet) 583 ms Calculated R Axis -90 degrees Calculated T Axis 95 degrees Diagnosis Ventricular-paced rhythm Abnormal ECG When compared with ECG of 25-JUN-2019 11:10, 
Electronic ventricular pacemaker has replaced Atrial fibrillation Confirmed by ANOOP AVENDAÑO (), Joshua Blunt ([id number]) on 8/8/2019 8:10:39 AM 
  
POC G3 Collection Time: 08/07/19  9:47 PM  
Result Value Ref Range Device: VENT    
 FIO2 (POC) 40 % pH (POC) 7.408 7.35 - 7.45    
 pCO2 (POC) 36.5 35 - 45 MMHG  
 pO2 (POC) 151 (H) 75 - 100 MMHG  
 HCO3 (POC) 23.0 22 - 26 MMOL/L  
 sO2 (POC) 99 (H) 95 - 98 % Base deficit (POC) 1 mmol/L Mode VENTILATOR/SPONTANEOUS/PRESSURE SUPPORT    
 PEEP/CPAP (POC) 8 cmH2O Pressure support 8 cmH2O Allens test (POC) NOT APPLICABLE Total resp. rate 16 Site RIGHT BRACHIAL Patient temp. 98.6 Specimen type (POC) ARTERIAL Performed by Michelle   
 CO2, POC 24 MMOL/L Respiratory comment: NurseNotified Exhaled minute volume 9.10 L/min COLLECT TIME 2,143 GLUCOSE, POC Collection Time: 08/07/19 11:32 PM  
Result Value Ref Range Glucose (POC) 327 (H) 65 - 100 mg/dL METABOLIC PANEL, BASIC Collection Time: 08/08/19  3:31 AM  
Result Value Ref Range Sodium 138 136 - 145 mmol/L Potassium 3.8 3.5 - 5.1 mmol/L Chloride 104 98 - 107 mmol/L  
 CO2 28 21 - 32 mmol/L Anion gap 6 (L) 7 - 16 mmol/L Glucose 220 (H) 65 - 100 mg/dL BUN 27 (H) 8 - 23 MG/DL Creatinine 1.21 0.8 - 1.5 MG/DL  
 GFR est AA >60 >60 ml/min/1.73m2 GFR est non-AA >60 >60 ml/min/1.73m2 Calcium 7.9 (L) 8.3 - 10.4 MG/DL  
CBC W/O DIFF Collection Time: 08/08/19  3:31 AM  
Result Value Ref Range WBC 18.9 (H) 4.3 - 11.1 K/uL  
 RBC 3.90 (L) 4.23 - 5.6 M/uL HGB 9.2 (L) 13.6 - 17.2 g/dL HCT 30.6 (L) 41.1 - 50.3 % MCV 78.5 (L) 79.6 - 97.8 FL  
 MCH 23.6 (L) 26.1 - 32.9 PG  
 MCHC 30.1 (L) 31.4 - 35.0 g/dL RDW 25.2 (H) 11.9 - 14.6 % PLATELET 715 793 - 073 K/uL MPV 10.2 9.4 - 12.3 FL ABSOLUTE NRBC 0.00 0.0 - 0.2 K/uL PROTHROMBIN TIME + INR Collection Time: 08/08/19  3:31 AM  
Result Value Ref Range Prothrombin time 30.1 (H) 11.7 - 14.5 sec INR 2.9 MAGNESIUM Collection Time: 08/08/19  3:31 AM  
Result Value Ref Range Magnesium 1.9 1.8 - 2.4 mg/dL PHOSPHORUS Collection Time: 08/08/19  3:31 AM  
Result Value Ref Range Phosphorus 2.8 2.3 - 3.7 MG/DL  
GLUCOSE, POC Collection Time: 08/08/19  5:55 AM  
Result Value Ref Range Glucose (POC) 238 (H) 65 - 100 mg/dL GLUCOSE, POC Collection Time: 08/08/19 11:59 AM  
Result Value Ref Range Glucose (POC) 250 (H) 65 - 100 mg/dL EKG:  (EKG has been independently visualized by me with interpretation below): RV apical paced rhythm.

## 2019-08-08 NOTE — PROGRESS NOTES
Patient doing well post-extubation. Currently on 4LNC. Wife updated on patient condition. Patient complaining of pain, will give PRN morphine.

## 2019-08-08 NOTE — PROGRESS NOTES
[id number] [facility] PROGRESS NOTE Subjective:  
 
Patient is a 68 y.o. MALE WELL KNOWN TO ME NOW 17 DAYS S/P ORIF LEFT HUMERAL SHAFT FRACTURE WITH MULTIPLE CURRENT ONGOING MEDICAL ISSUES. Patient Active Problem List  
 Diagnosis Date Noted  Arrhythmia 08/08/2019  Torsades de pointes ([address] 08/08/2019  V-tach ([address] 08/07/2019  Symptomatic bradycardia 08/07/2019  Closed displaced transverse fracture of shaft of right humerus 07/22/2019  Hypotension 07/22/2019  Hypovolemic shock ([address] 07/22/2019  Sacral wound 07/22/2019  Closed displaced transverse fracture of shaft of left humerus 07/17/2019  Acute metabolic encephalopathy 24/59/5191  Open leg wound 06/25/2019  Humeral fracture 06/25/2019  UZMA (acute kidney injury) ([address] 06/25/2019  Metabolic encephalopathy 73/25/7553  Left humeral fracture 06/25/2019  Persistent atrial fibrillation ([address] 01/30/2019  Toenail deformity 12/28/2018  Paronychia of great toe of right foot 12/28/2018  Microcytic anemia 12/28/2018  Long term (current) use of anticoagulants 04/27/2018  Type 2 diabetes mellitus with nephropathy ([address] 12/26/2017  Debility 11/14/2016  Rectal bleed 11/08/2016  Hypoxia 05/06/2016  JASON (obstructive sleep apnea) 04/07/2016  Edema  Nephrolithiasis  Peripheral vascular disease; arterial ([address]  S/P CABG (coronary artery bypass graft)  Atrial flutter ([address] 02/03/2016  Diabetes mellitus type 2, controlled ([address] 12/15/2015  Essential hypertension, benign 09/15/2015  Mixed hyperlipidemia 09/15/2015  Insomnia 09/15/2015  Diabetic peripheral neuropathy ([address] 09/15/2015  Chronic systolic heart failure ([address] 10/20/2010  CAD (coronary artery disease)--cath in 2008 noted patent grafts, EF 35-40% 10/18/2010  CKD (chronic kidney disease) stage 3, GFR 30-59 ml/min ([facility]) 10/18/2010  Morbid obesity ([address] 10/18/2010 Past Medical History: Diagnosis Date  Anxiety Managed with meds  Arthritis  Atrial flutter ([facility] 2/3/2016  CAD (coronary artery disease) Bypass x 5, Dr. Gregg Guadalupe follows  Cardiomyopathy ([facility]  Chronic systolic heart failure ([facility] 10/20/2010  CKD (chronic kidney disease) stage 3, GFR 30-59 ml/min ([facility]) 10/18/2010 Followed by Dr. Stevie Cuellar  Diabetic peripheral neuropathy ([facility] 9/15/2015  Dyslipidemia 10/18/2010 Daily meds  Edema  Essential hypertension, benign 09/15/2015 Mnaged with meds  H/O falling  Insomnia 9/15/2015  
 sleep apnea - uses CPAP  
 Mixed hyperlipidemia 9/15/2015  Morbid obesity ([facility] 10/18/2010  Nephrolithiasis  Oxygen dependent O2 @ 4L NC   
 PAD (peripheral artery disease) ([facility] ANGIOPLASTY- Right SFA angioplasty and stent for claudication Dr. Korey Mcclure, Vascular- Dr. Jose A Kowalski  Peripheral neuropathy  Pneumonia 07/04/2019  Renal insufficiency  Right heart failure ([facility])  S/P CABG (coronary artery bypass graft)  Sleep apnea C-pap and 4 L O2 per NC  Type II or unspecified type diabetes mellitus without mention of complication, uncontrolled ([facility] 09/15/2015 Insulin, Average recently 130-300, unknown last A1C Past Surgical History:  
Procedure Laterality Date  CARDIAC SURG PROCEDURE UNLIST By-pass x 5  
 HX ANGIOPLASTY    
 right SFA angioplasty and stent for claudication Dr. Korey Mcclure  HX CATARACT REMOVAL Bilateral   
 HX HEART CATHETERIZATION    
 HX UROLOGICAL Kidney stone removal  
  
Prior to Admission medications Medication Sig Start Date End Date Taking? Authorizing Provider  
ferrous sulfate 325 mg (65 mg iron) tablet Take 1 Tab by mouth two (2) times daily (with meals) for 30 days. 7/25/19 8/24/19  Xi Thibodeaux,   
ammonium lactate (LAC-HYDRIN) 12 % lotion Apply  to affected area nightly.  rub in to affected area well BLE    Provider, Historical  
 albuterol-ipratropium (DUO-NEB) 2.5 mg-0.5 mg/3 ml nebu 3 mL by Nebulization route four (4) times daily. Provider, Historical  
Oxygen Indications: 3 L via NC    Provider, Historical  
warfarin (COUMADIN) 2.5 mg tablet Take 2.5 mg by mouth every Tuesday, Thursday, Saturday & Sunday. Provider, Historical  
HUMALOG U-100 INSULIN 100 unit/mL injection by SubCUTAneous route. Indications: sliding scale    Provider, Historical  
simethicone (MYLICON) 80 mg chewable tablet Take 1 Tab by mouth four (4) times daily as needed for Flatulence. 6/29/19   HIRAM Mcfadden  
allopurinol (ZYLOPRIM) 100 mg tablet Take 100 mg by mouth daily. Provider, Historical  
warfarin (COUMADIN) 5 mg tablet Take 5 mg by mouth every Monday, Wednesday, Friday. Provider, Historical  
atorvastatin (LIPITOR) 40 mg tablet 1 every day for cholesterol (replaces simvastatin) Patient taking differently: Take 40 mg by mouth nightly. Indications: combined high blood cholesterol and triglyceride level 2/1/19   Nathan Zamora MD  
azelastine (ASTELIN) 137 mcg (0.1 %) nasal spray Use in each nostril bid Patient taking differently: 2 Sprays by Both Nostrils route two (2) times a day. 6/1/18   Nathan Zamora MD  
azelastine (OPTIVAR) 0.05 % ophthalmic solution Use in affected eye bid to tid for allergies Patient taking differently: Administer 1 Drop to both eyes three (3) times daily. 6/1/18   Nathan Zamora MD  
Insulin Syringe-Needle U-100 (BD INSULIN SYRINGE) 1 mL 28 gauge x 1/2\" syrg Use 5 x a day. Dx E11.65  Indications: 5x a day 6/1/18   Nathan Zamora MD  
glucose blood VI test strips (ONETOUCH ULTRA TEST) strip Use bid to tid   DX: E 11.65   Disp with lancets of formulary  Indications: dispense with lancets 6/1/18   Nathan Zamora MD  
bumetanide (BUMEX) 1 mg tablet 1 to 2 every day for fluid Patient taking differently: Take 2 mg by mouth daily.  1 to 2 every day for fluid 2/23/18   Nathan Zamora MD  
 DULoxetine (CYMBALTA) 60 mg capsule Take 1 Cap by mouth daily. Indications: ANXIETY WITH DEPRESSION, NEUROPATHIC PAIN 18   Damien Champagne MD  
lancets [facility] DELICA LANCETS) 30 gauge misc Use up to tid   Dx E11.65 18   Damien Champagne MD  
Blood-Glucose Meter [facility] Eleanor Child) monitoring kit Use bid to tid   DX E11.65  If another product preferred on formulary please let me know which to select from. jlb 17   Damien Champagne MD  
aspirin 81 mg chewable tablet Take 1 Tab by mouth daily. 16   Tin Jacobs MD  
polyethylene glycol (MIRALAX) 17 gram packet Take 1 Packet by mouth daily. 16   Tin Jacobs MD  
hydrocortisone (ANUSOL-HC) 2.5 % rectal cream Insert  into rectum four (4) times daily. Patient taking differently: Insert  into rectum four (4) times daily as needed. 16   Annie Fink PA-C  
nitroglycerin (NITROSTAT) 0.4 mg SL tablet 1 Tab by SubLINGual route as needed for Chest Pain. 16   Ferdinand Cummings MD  
fluticasone (FLONASE) 50 mcg/actuation nasal spray 1 spray IEN every day to BID Patient taking differently: 1 Spray by Both Nostrils route two (2) times a day. 10/14/15   Damien Champagne MD  
 
No Known Allergies Social History Tobacco Use  Smoking status: Former Smoker Years: 20.00 Last attempt to quit: 1986 Years since quittin.6  Smokeless tobacco: Never Used Substance Use Topics  Alcohol use: No  
  Comment: quit Family History Problem Relation Age of Onset  No Known Problems Mother  Coronary Artery Disease Father  No Known Problems Sister Review of Systems A comprehensive review of systems was negative except for that written in the HPI. Objective:  
 
Patient Vitals for the past 8 hrs: 
 BP Temp Pulse Resp SpO2  
19 1359 93/54  90 19 99 % 19 1330 108/52  90 25 100 % 19 1159 124/58 98.9 °F (37.2 °C) 91 22 100 % 19 1130 116/52  99 20 100 % 08/08/19 1059 126/58  90 18 100 % 08/08/19 1029 125/60  90 20 100 % 08/08/19 0959 123/80  90 25 99 % 08/08/19 0930 126/61  90 20 100 % 08/08/19 0905 124/47  91    
08/08/19 0859 124/47  90 21 99 % 08/08/19 0845 131/56  90 19 99 % 08/08/19 0831 95/53  90 22 100 % 08/08/19 0815 113/56  90 (!) 51 99 % 08/08/19 0759 120/53  90 21 98 % 08/08/19 0744 105/46  90 24 (!) 88 % 08/08/19 0729 103/47  90 19 93 % 08/08/19 0711 114/44 98.6 °F (37 °C) 91 (!) 2 95 % Visit Vitals BP 93/54 Pulse 90 Temp 98.9 °F (37.2 °C) Resp 19 Wt 143 kg (315 lb 4.1 oz) SpO2 99% BMI 42.76 kg/m² General:  Alert, cooperative, no distress, appears stated age. Head:  Normocephalic, without obvious abnormality, atraumatic. Extremities: Extremities normal, atraumatic, no cyanosis or edema. Pulses: 2+ and symmetric all extremities. Skin: Skin color, texture, turgor normal. No rashes or lesions LEFT ARM INCISION CLEAN, DRY, INTACT 
NEUROVASCULAR INTACT Assessment:  
 
Active Problems: Morbid obesity ([address] (10/18/2010) Diabetes mellitus type 2, controlled ([address] (12/15/2015) Atrial flutter ([address] (2/3/2016) JASON (obstructive sleep apnea) (4/7/2016) Hypoxia (5/6/2016) Debility (11/14/2016) Acute metabolic encephalopathy (8/46/9814) V-tach ([address] (8/7/2019) Arrhythmia (8/8/2019) Torsades de pointes ([address] (8/8/2019) Plan:  
 
REMOVE STAPLES 
BEGIN PHYSICAL THERAPY LEFT UPPER EXTREMITY CHECK X RAY LEFT ARM TOMORROW 
MAY SHOWER Luis Taylor MD

## 2019-08-08 NOTE — PROGRESS NOTES
Updated MAR and RN note, pt extubated on 4L faxed. Kennedy barraza on phone currently with [facility] and trying to expedite approval for return to [facility].

## 2019-08-08 NOTE — PROGRESS NOTES
Critical Care Daily Progress Note: 8/8/2019 Maverick Foreman Admission Date: 8/7/2019 The patient's chart is reviewed and the patient is discussed with the staff. 68 y.o. CM noted in Vtach/torsades on 8/5 while at [facility].  Was intubated, started on Isuprel drip and treated for prolonged QT. Was seen by electrophysiology with recommendations for permanent pacemaker. He was been admitted to [facility] after extended stay at [address] [place] [address] due to hypovolemic shock and UTI after a fall with left shoulder fracture with surgical repair Chronic Medical: CAD with hx CABG x5, PVD with stent right SFA for claudication, cardiomyopathy, CKD, morbid obesity, JASON (has BIPAP) chronic sHF, HTN, DM type II, dementaia, [place], recent shoulder fracture, UTI and hypovolemic shock. Single chamber pacemaker implanted 8/7 by Dr. Peggy Lundberg. Was weaned and able to be extubated 8/7 later that evening post procedure. Tolerating  4L NC. Is being followed by ID for MRSA bacteruria with Vancomycin. Subjective:  
 
Lying in bed sleeping, arouses to voice and answers questions with 1-2 word reply. Wife at the bedside and states he has been awake much of  The morning. Has productive cough and using oral suction. Current Facility-Administered Medications Medication Dose Route Frequency  nystatin (MYCOSTATIN) 100,000 unit/gram powder   Topical PRN  
 aspirin chewable tablet 81 mg  81 mg Oral DAILY  atorvastatin (LIPITOR) tablet 40 mg  40 mg Oral QHS  ceFAZolin (ANCEF) 3 g/30 mL in sterile water IV syringe  3 g IntraVENous Q8H  
 docusate sodium (COLACE) capsule 100 mg  100 mg Oral BID  famotidine (PEPCID) tablet 20 mg  20 mg Oral BID  
 HYDROcodone-acetaminophen (NORCO) 5-325 mg per tablet 1 Tab  1 Tab Oral Q4H PRN  
 insulin lispro (HUMALOG) injection   SubCUTAneous Q6H  
 lidocaine-EPINEPHrine (XYLOCAINE) 1 %-1:100,000 injection 15 mg  1.5 mL IntraDERMal Multiple  morphine injection 2 mg  2 mg IntraVENous Q4H PRN  
 sodium chloride (NS) flush 5-40 mL  5-40 mL IntraVENous PRN  
 nitroglycerin (NITROSTAT) tablet 0.4 mg  0.4 mg SubLINGual Q5MIN PRN  
 sodium chloride (NS) flush 5-40 mL  5-40 mL IntraVENous PRN  
 sodium chloride (NS) flush 5-40 mL  5-40 mL IntraVENous Q8H  
 vancomycin (VANCOCIN) 1750 mg in  ml infusion  1,750 mg IntraVENous Q24H  
 sodium chloride (NS) flush 5-40 mL  5-40 mL IntraVENous PRN  
 acetaminophen (TYLENOL) tablet 650 mg  650 mg Oral Q4H PRN  
 HYDROcodone-acetaminophen (NORCO) 5-325 mg per tablet 1 Tab  1 Tab Oral Q4H PRN Facility-Administered Medications Ordered in Other Encounters Medication Dose Route Frequency  sodium chloride (NS) flush 5-40 mL  5-40 mL IntraVENous Q8H  
 sodium chloride (NS) flush 5-40 mL  5-40 mL IntraVENous PRN Review of Systems Constitutional:  negative for fever, chills, sweats Cardiovascular:  negative for chest pain, palpitations, syncope, edema Gastrointestinal:  NG to suction, negative for reflux, vomiting, diarrhea, abdominal pain, or melena Neurologic:  negative for focal weakness, numbness, headache Objective:  
 
Vitals:  
 08/08/19 1029 08/08/19 1059 08/08/19 1130 08/08/19 1159 BP: 125/60 126/58 116/52 124/58 Pulse: 90 90 99 91 Resp: 20 18 20 22 Temp:    98.9 °F (37.2 °C) SpO2: 100% 100% 100% 100% Weight:      
 
 
 
Intake/Output Summary (Last 24 hours) at 8/8/2019 1306 Last data filed at 8/8/2019 1211 Gross per 24 hour Intake 30 ml Output 2475 ml Net -2445 ml Physical Exam:         
Constitutional:  the patient is morbidly obese and in no acute distress, NC 4L sat 100% EENMT:  Sclera clear, pupils equal, oral mucosa moist 
Respiratory: few scattered anterior crackles, no wheezing, productive cough with tan colored secretion in suction tubing Cardiovascular:  Irregular, AFib, paced, left chest pacemaker site without M,G,R 
 Gastrointestinal: taunt, obese abdomen, NG to suction with gold/brown gastric contents; with positive bowel sounds. Musculoskeletal: warm without cyanosis. There is no lower extremity edema. Skin:  no jaundice or rashes, left shoulder incision and left chest pacemaker site Neurologic: no gross neuro deficits Psychiatric:  alert and oriented x 1 LINES:   
PIV site NG to suction Cardenas 8/7/19 DRIPS:   none CXR:  
8/7/19:   
 
 
LAB Recent Labs 08/08/19 
1159 08/08/19 
0555 08/07/19 
2332 GLUCPOC 250* 238* 327* Recent Labs 08/08/19 
1817 08/07/19 
8347 08/06/19 
5938 08/05/19 
2230 WBC 18.9* 17.8* 21.5* 23.1* HGB 9.2* 8.5* 9.8* 9.9*  
HCT 30.6* 28.8* 33.4* 32.8*  380 462* 421 INR 2.9 2.6  --  2.4 Recent Labs 08/08/19 
3125 08/07/19 
3395 08/06/19 
8410 08/05/19 
2230  137 139 137  
K 3.8 4.1 4.2 4.9  
 104 103 101 CO2 28 22 28 27 * 312* 160* 214* BUN 27* 29* 26* 23  
CREA 1.21 1.43 1.48 1.29  
MG 1.9 2.0 2.0 1.9 PHOS 2.8  --   --  3.9*  
CA 7.9* 7.7* 8.5 8.1*  
TROIQ  --   --   --  <0.02* ALB  --   --  1.9* 2.1* TBILI  --   --  0.6 0.7 ALT  --   --  17 20 SGOT  --   --  48* 70* Recent Labs 08/06/19 
9712 LAC 2.0 Recent Labs 08/07/19 
2147 08/07/19 
1824 PHI 7.408 7.337* PCO2I 36.5 42.2 PO2I 151* 105* HCO3I 23.0 22.6 Assessment:  (Medical Decision Making) Patient Active Problem List  
Diagnosis Code  CAD (coronary artery disease)--cath in 2008 noted patent grafts, EF 35-40% I25.10  CKD (chronic kidney disease) stage 3, GFR 30-59 ml/min (HCC) N18.3  Morbid obesity (HCC) E66.01  
 Chronic systolic heart failure (HCC) I50.22  
 Essential hypertension, benign I10  
 Mixed hyperlipidemia E78.2  Insomnia G47.00  
 Diabetic peripheral neuropathy ([facility]) E11.42  
 Diabetes mellitus type 2, controlled ([address] E11.9  Atrial flutter (HCC) I48.92  
 Edema R60.9  Nephrolithiasis N20.0  Peripheral vascular disease; arterial (HCC) I73.9  
 S/P CABG (coronary artery bypass graft) Z95.1  JASON (obstructive sleep apnea) G47.33  
 Hypoxia R09.02  
 Rectal bleed K62.5  Debility R53.81  Type 2 diabetes mellitus with nephropathy (HCC) E11.21  
 Long term (current) use of anticoagulants Z79.01  
 Toenail deformity L60.8  Paronychia of great toe of right foot L03.031  
 Microcytic anemia D50.9  Persistent atrial fibrillation (HCC) I48.1  Acute metabolic encephalopathy B79.31  
 Open leg wound S81.809A  Humeral fracture S42.309A  UZMA (acute kidney injury) ([facility] N17.9  Metabolic encephalopathy G40.01  Left humeral fracture S42.302A  Closed displaced transverse fracture of shaft of left humerus F16.453K  Closed displaced transverse fracture of shaft of right humerus H53.403K  Hypotension I95.9  Hypovolemic shock ([facility]) R57.1  Sacral wound S31.000A  V-tach ([facility] I47.2  Symptomatic bradycardia R00.1  Arrhythmia I49.9  Torsades de pointes ([facility]) I47.2 Plan:  (Medical Decision Making) Hospital Problems  Date Reviewed: 2/8/2019 Codes Class Noted POA Arrhythmia ICD-10-CM: I49.9 ICD-9-CM: 427.9  8/8/2019 Unknown Per cardiology Torsades de pointes [facility]) ICD-10-CM: I49.3 ICD-9-CM: 427.1  8/8/2019 Unknown Per cardiology V-tach [facility]) ICD-10-CM: Q96.3 ICD-9-CM: 427.1  8/7/2019 Yes  
 resolved Acute metabolic encephalopathy FUO-76-FE: G93.41 
ICD-9-CM: 348.31  6/25/2019 Yes  
 unchanged Debility ICD-10-CM: R53.81 ICD-9-CM: 799.3  11/14/2016 Yes  
 unchanged Hypoxia ICD-10-CM: R09.02 
ICD-9-CM: 799.02  5/6/2016 Yes Sat acceptable JASON (obstructive sleep apnea) (Chronic) ICD-10-CM: G47.33 
ICD-9-CM: 327.23  4/7/2016 Yes Wife to bring BIPAP for use tonight Atrial flutter (HCC) (Chronic) ICD-10-CM: M19.96 
ICD-9-CM: 427.32  2/3/2016 Yes  
 controlled Diabetes mellitus type 2, controlled ([facility] (Chronic) ICD-10-CM: E11.9 ICD-9-CM: 250.00  12/15/2015 Yes  
 chronic Morbid obesity ([facility] (Chronic) ICD-10-CM: E66.01 
ICD-9-CM: 278.01  10/18/2010 Yes  
 chronic  
  
 
 
--Ancef, Vancomycin 
--ID following --Wife to bring in BIPAP machine--will apply tonight 
--Planning to return to [facility] when approved. --Moving to the floor, care to be assumed by hospitalist till transfer arranged. More than 50% of the time documented was spent in face-to-face contact with the patient and in the care of the patient on the floor/unit where the patient is located. Angeli Wright NP I have spoken with and examined the patient. I agree with the above assessment and plan as documented. Mr. Dana Monique remains delirious and on 4lpm.  He hasn't been on BiPAP for JASON and we plan to restart. Gen: awake, sleepy and not oriented Lungs:  Clear anteriorly Heart:  RRR with no Murmur/Rubs/Gallops Abd: +Bs Ext: no edema 
 
--f/u CXR in am 
--encourage IS 
--BiPAP at night. 
--will follow on telemetry floor as he weans off oxygen.  
 
Yomaira Dawson MD

## 2019-08-08 NOTE — PROGRESS NOTES
Pt discussed in am IDR and with Dr. Cordelia Seaman. Okay to return to [facility] today. Russ Altamn with [place] notified and updated clinical faxed. Will start precert today. Primary RN, Brynn Burns aware. Care Management Interventions PCP Verified by CM: Yes Mode of Transport at Discharge: Other (see comment) Transition of Care Consult (CM Consult): Discharge Planning, LTAC(admitted from [place]) Current Support Network: Lives with Spouse Confirm Follow Up Transport: Family Freedom of Choice Offered: Yes [employer] Information Provided?: ([facility]) Discharge Location Discharge Placement: [address] [facility])

## 2019-08-08 NOTE — WOUND CARE
Bilateral lower legs with dry flaky fragile skin, mild trace edema, normal staining/ changes from venous stasis in the past. Left 4th toe tip with 2x2cm scab/ ?eschar recommend betadine paint daily. Left shoulder with surgical incision currently covered with 4x4 and tegaderm, recommend changing as directed by orthopedic doctor. Left chest with Aquacel surgical in place from recent Pacemaker surgery, defer to cardiology for care of this area. Right buttock with [id number] ulcer, using silicone/ foam, would continue. Will monitor. Spoke to [facility] at Dr. Tee Maravilla office ( orthopedic) Order to change gauze and tegaderm to left shoulder surgery daily. Will need follow up with orthopedic rescheduled was due to have post op visit 8/5/19 which was missed secondary to this hospitalization.

## 2019-08-08 NOTE — PROGRESS NOTES
Per Leanna, NP, pt denied for [facility] by [facility]. They were told by Dr. Kanchan Prater that pt needed one midnight and pt could return. Cardiology stating their services are complete. Call to Gerhard Decker, liaison with [facility] to notify and discuss situation. He will discuss with Alejandra Pope. Number for ANTONIO Ram given to Gerhard Decker for Edna to call. CM following.

## 2019-08-09 ENCOUNTER — PATIENT OUTREACH (OUTPATIENT)
Dept: CASE MANAGEMENT | Age: 69
End: 2019-08-09

## 2019-08-09 LAB
ALBUMIN SERPL-MCNC: 1.9 G/DL (ref 3.2–4.6)
ALBUMIN/GLOB SERPL: 0.4 {RATIO} (ref 1.2–3.5)
ALP SERPL-CCNC: 216 U/L (ref 50–136)
ALT SERPL-CCNC: 10 U/L (ref 12–65)
ANION GAP SERPL CALC-SCNC: 4 MMOL/L (ref 7–16)
AST SERPL-CCNC: 14 U/L (ref 15–37)
BILIRUB SERPL-MCNC: 0.6 MG/DL (ref 0.2–1.1)
BNP SERPL-MCNC: 708 PG/ML
BUN SERPL-MCNC: 29 MG/DL (ref 8–23)
CALCIUM SERPL-MCNC: 8.3 MG/DL (ref 8.3–10.4)
CHLORIDE SERPL-SCNC: 105 MMOL/L (ref 98–107)
CO2 SERPL-SCNC: 30 MMOL/L (ref 21–32)
CREAT SERPL-MCNC: 0.93 MG/DL (ref 0.8–1.5)
ERYTHROCYTE [DISTWIDTH] IN BLOOD BY AUTOMATED COUNT: 24.8 % (ref 11.9–14.6)
GLOBULIN SER CALC-MCNC: 4.4 G/DL (ref 2.3–3.5)
GLUCOSE SERPL-MCNC: 204 MG/DL (ref 65–100)
HCT VFR BLD AUTO: 31.7 % (ref 41.1–50.3)
HGB BLD-MCNC: 9.3 G/DL (ref 13.6–17.2)
HIV 1+2 AB+HIV1 P24 AG SERPL QL IA: NON REACTIVE
MAGNESIUM SERPL-MCNC: 2 MG/DL (ref 1.8–2.4)
MCH RBC QN AUTO: 23.3 PG (ref 26.1–32.9)
MCHC RBC AUTO-ENTMCNC: 29.3 G/DL (ref 31.4–35)
MCV RBC AUTO: 79.3 FL (ref 79.6–97.8)
NRBC # BLD: 0 K/UL (ref 0–0.2)
PHOSPHATE SERPL-MCNC: 2.7 MG/DL (ref 2.3–3.7)
PLATELET # BLD AUTO: 376 K/UL (ref 150–450)
PMV BLD AUTO: 10.5 FL (ref 9.4–12.3)
POTASSIUM SERPL-SCNC: 3.4 MMOL/L (ref 3.5–5.1)
PROT SERPL-MCNC: 6.3 G/DL (ref 6.3–8.2)
RBC # BLD AUTO: 4 M/UL (ref 4.23–5.6)
RPR SER QL: NONREACTIVE
SODIUM SERPL-SCNC: 139 MMOL/L (ref 136–145)
VANCOMYCIN TROUGH SERPL-MCNC: 15.1 UG/ML (ref 5–20)
WBC # BLD AUTO: 13.7 K/UL (ref 4.3–11.1)

## 2019-08-09 PROCEDURE — 83735 ASSAY OF MAGNESIUM: CPT

## 2019-08-09 PROCEDURE — 85027 COMPLETE CBC AUTOMATED: CPT

## 2019-08-09 PROCEDURE — 80053 COMPREHEN METABOLIC PANEL: CPT

## 2019-08-09 PROCEDURE — 84100 ASSAY OF PHOSPHORUS: CPT

## 2019-08-09 PROCEDURE — 80202 ASSAY OF VANCOMYCIN: CPT

## 2019-08-09 PROCEDURE — 36415 COLL VENOUS BLD VENIPUNCTURE: CPT

## 2019-08-09 PROCEDURE — 83880 ASSAY OF NATRIURETIC PEPTIDE: CPT

## 2019-08-09 NOTE — PROGRESS NOTES
Patient discharged and transferred to [facility] by [facility] staff on monitor. Wife present at bedside.

## 2019-08-09 NOTE — PROGRESS NOTES
Patient on ALYSON list 
No ALYSON needed at this time due to patient still admitted to hospital [facility] 
PLAN:  
No ALYSON needed at this time due to patient is still admitted to [facility] and will be reassigned Manuel Castillo discharged from hospital to home This note will not be viewable in [address]

## 2019-08-10 LAB
INR PPP: 2.2
POTASSIUM SERPL-SCNC: 3.8 MMOL/L (ref 3.5–5.1)
PROTHROMBIN TIME: 24.8 SEC (ref 11.7–14.5)

## 2019-08-10 PROCEDURE — 36415 COLL VENOUS BLD VENIPUNCTURE: CPT

## 2019-08-10 PROCEDURE — 84132 ASSAY OF SERUM POTASSIUM: CPT

## 2019-08-10 PROCEDURE — 85610 PROTHROMBIN TIME: CPT

## 2019-08-11 LAB
BACTERIA SPEC CULT: NORMAL
BACTERIA SPEC CULT: NORMAL
INR PPP: 1.7
PROTHROMBIN TIME: 20.3 SEC (ref 11.7–14.5)
SERVICE CMNT-IMP: NORMAL
SERVICE CMNT-IMP: NORMAL

## 2019-08-11 PROCEDURE — 85610 PROTHROMBIN TIME: CPT

## 2019-08-11 PROCEDURE — 36415 COLL VENOUS BLD VENIPUNCTURE: CPT

## 2019-08-12 LAB
ALBUMIN SERPL-MCNC: 2.1 G/DL (ref 3.2–4.6)
ALBUMIN/GLOB SERPL: 0.4 {RATIO} (ref 1.2–3.5)
ALP SERPL-CCNC: 296 U/L (ref 50–136)
ALT SERPL-CCNC: 18 U/L (ref 12–65)
ANION GAP SERPL CALC-SCNC: 5 MMOL/L (ref 7–16)
AST SERPL-CCNC: 26 U/L (ref 15–37)
BILIRUB SERPL-MCNC: 0.5 MG/DL (ref 0.2–1.1)
BUN SERPL-MCNC: 26 MG/DL (ref 8–23)
CALCIUM SERPL-MCNC: 8.7 MG/DL (ref 8.3–10.4)
CHLORIDE SERPL-SCNC: 101 MMOL/L (ref 98–107)
CO2 SERPL-SCNC: 31 MMOL/L (ref 21–32)
CREAT SERPL-MCNC: 0.86 MG/DL (ref 0.8–1.5)
ERYTHROCYTE [DISTWIDTH] IN BLOOD BY AUTOMATED COUNT: 24.7 % (ref 11.9–14.6)
GLOBULIN SER CALC-MCNC: 4.8 G/DL (ref 2.3–3.5)
GLUCOSE SERPL-MCNC: 188 MG/DL (ref 65–100)
HCT VFR BLD AUTO: 32.5 % (ref 41.1–50.3)
HGB BLD-MCNC: 9.7 G/DL (ref 13.6–17.2)
MCH RBC QN AUTO: 23.4 PG (ref 26.1–32.9)
MCHC RBC AUTO-ENTMCNC: 29.8 G/DL (ref 31.4–35)
MCV RBC AUTO: 78.5 FL (ref 79.6–97.8)
NRBC # BLD: 0 K/UL (ref 0–0.2)
PLATELET # BLD AUTO: 403 K/UL (ref 150–450)
PMV BLD AUTO: 9.8 FL (ref 9.4–12.3)
POTASSIUM SERPL-SCNC: 3.6 MMOL/L (ref 3.5–5.1)
PROT SERPL-MCNC: 6.9 G/DL (ref 6.3–8.2)
RBC # BLD AUTO: 4.14 M/UL (ref 4.23–5.6)
SODIUM SERPL-SCNC: 137 MMOL/L (ref 136–145)
WBC # BLD AUTO: 10.8 K/UL (ref 4.3–11.1)

## 2019-08-12 PROCEDURE — 85027 COMPLETE CBC AUTOMATED: CPT

## 2019-08-12 PROCEDURE — 80053 COMPREHEN METABOLIC PANEL: CPT

## 2019-08-13 LAB
ALBUMIN SERPL-MCNC: 2 G/DL (ref 3.2–4.6)
ALBUMIN/GLOB SERPL: 0.4 {RATIO} (ref 1.2–3.5)
ALP SERPL-CCNC: 322 U/L (ref 50–136)
ALT SERPL-CCNC: 24 U/L (ref 12–65)
ANION GAP SERPL CALC-SCNC: 8 MMOL/L (ref 7–16)
AST SERPL-CCNC: 32 U/L (ref 15–37)
BILIRUB SERPL-MCNC: 0.4 MG/DL (ref 0.2–1.1)
BUN SERPL-MCNC: 21 MG/DL (ref 8–23)
CALCIUM SERPL-MCNC: 8.2 MG/DL (ref 8.3–10.4)
CHLORIDE SERPL-SCNC: 100 MMOL/L (ref 98–107)
CK MB CFR SERPL CALC: NORMAL %
CK MB SERPL-MCNC: <1 NG/ML (ref 0.5–3.6)
CK SERPL-CCNC: 26 U/L (ref 21–215)
CK SERPL-CCNC: 27 U/L (ref 21–215)
CO2 SERPL-SCNC: 26 MMOL/L (ref 21–32)
CREAT SERPL-MCNC: 0.96 MG/DL (ref 0.8–1.5)
GLOBULIN SER CALC-MCNC: 4.9 G/DL (ref 2.3–3.5)
GLUCOSE SERPL-MCNC: 272 MG/DL (ref 65–100)
INR PPP: 1.3
MAGNESIUM SERPL-MCNC: 1.7 MG/DL (ref 1.8–2.4)
POTASSIUM SERPL-SCNC: 4 MMOL/L (ref 3.5–5.1)
PROT SERPL-MCNC: 6.9 G/DL (ref 6.3–8.2)
PROTHROMBIN TIME: 16.9 SEC (ref 11.7–14.5)
SODIUM SERPL-SCNC: 134 MMOL/L (ref 136–145)
TROPONIN I SERPL-MCNC: <0.02 NG/ML (ref 0.02–0.05)

## 2019-08-13 PROCEDURE — 85610 PROTHROMBIN TIME: CPT

## 2019-08-13 PROCEDURE — 82550 ASSAY OF CK (CPK): CPT

## 2019-08-13 PROCEDURE — 83735 ASSAY OF MAGNESIUM: CPT

## 2019-08-13 PROCEDURE — 36415 COLL VENOUS BLD VENIPUNCTURE: CPT

## 2019-08-13 PROCEDURE — 82553 CREATINE MB FRACTION: CPT

## 2019-08-13 PROCEDURE — 80053 COMPREHEN METABOLIC PANEL: CPT

## 2019-08-13 PROCEDURE — 84484 ASSAY OF TROPONIN QUANT: CPT

## 2019-08-15 ENCOUNTER — APPOINTMENT (OUTPATIENT)
Dept: GENERAL RADIOLOGY | Age: 69
End: 2019-08-15
Attending: ORTHOPAEDIC SURGERY

## 2019-08-15 LAB
INR PPP: 1.5
PROTHROMBIN TIME: 18.1 SEC (ref 11.7–14.5)

## 2019-08-15 PROCEDURE — 85610 PROTHROMBIN TIME: CPT

## 2019-08-15 PROCEDURE — 73060 X-RAY EXAM OF HUMERUS: CPT

## 2019-08-16 ENCOUNTER — PATIENT OUTREACH (OUTPATIENT)
Dept: CASE MANAGEMENT | Age: 69
End: 2019-08-16

## 2019-08-16 LAB
INR PPP: 1.4
PROTHROMBIN TIME: 17.6 SEC (ref 11.7–14.5)

## 2019-08-16 PROCEDURE — 85610 PROTHROMBIN TIME: CPT

## 2019-08-16 PROCEDURE — 36415 COLL VENOUS BLD VENIPUNCTURE: CPT

## 2019-08-16 NOTE — PROGRESS NOTES
Patient currently admitted to [address]:  Will attempt outreach to patient when discharged from [address] [facility] to home   This note will not be viewable in [address]

## 2019-08-17 LAB
ANION GAP SERPL CALC-SCNC: 7 MMOL/L (ref 7–16)
BUN SERPL-MCNC: 27 MG/DL (ref 8–23)
CALCIUM SERPL-MCNC: 8.6 MG/DL (ref 8.3–10.4)
CHLORIDE SERPL-SCNC: 99 MMOL/L (ref 98–107)
CO2 SERPL-SCNC: 32 MMOL/L (ref 21–32)
CREAT SERPL-MCNC: 1.04 MG/DL (ref 0.8–1.5)
ERYTHROCYTE [DISTWIDTH] IN BLOOD BY AUTOMATED COUNT: 25.9 % (ref 11.9–14.6)
GLUCOSE SERPL-MCNC: 236 MG/DL (ref 65–100)
HCT VFR BLD AUTO: 33.3 % (ref 41.1–50.3)
HGB BLD-MCNC: 9.8 G/DL (ref 13.6–17.2)
MAGNESIUM SERPL-MCNC: 1.9 MG/DL (ref 1.8–2.4)
MCH RBC QN AUTO: 23.6 PG (ref 26.1–32.9)
MCHC RBC AUTO-ENTMCNC: 29.4 G/DL (ref 31.4–35)
MCV RBC AUTO: 80.2 FL (ref 79.6–97.8)
NRBC # BLD: 0.03 K/UL (ref 0–0.2)
PLATELET # BLD AUTO: 384 K/UL (ref 150–450)
PMV BLD AUTO: 9.7 FL (ref 9.4–12.3)
POTASSIUM SERPL-SCNC: 4 MMOL/L (ref 3.5–5.1)
RBC # BLD AUTO: 4.15 M/UL (ref 4.23–5.6)
SODIUM SERPL-SCNC: 138 MMOL/L (ref 136–145)
WBC # BLD AUTO: 12.1 K/UL (ref 4.3–11.1)

## 2019-08-17 PROCEDURE — 80048 BASIC METABOLIC PNL TOTAL CA: CPT

## 2019-08-17 PROCEDURE — 83735 ASSAY OF MAGNESIUM: CPT

## 2019-08-17 PROCEDURE — 85027 COMPLETE CBC AUTOMATED: CPT

## 2019-08-17 PROCEDURE — 36415 COLL VENOUS BLD VENIPUNCTURE: CPT

## 2019-08-19 LAB
ALBUMIN SERPL-MCNC: 2 G/DL (ref 3.2–4.6)
ALBUMIN/GLOB SERPL: 0.3 {RATIO} (ref 1.2–3.5)
ALP SERPL-CCNC: 339 U/L (ref 50–136)
ALT SERPL-CCNC: 24 U/L (ref 12–65)
ANION GAP SERPL CALC-SCNC: 13 MMOL/L (ref 7–16)
AST SERPL-CCNC: 32 U/L (ref 15–37)
BILIRUB SERPL-MCNC: 0.8 MG/DL (ref 0.2–1.1)
BUN SERPL-MCNC: 31 MG/DL (ref 8–23)
CALCIUM SERPL-MCNC: 8.6 MG/DL (ref 8.3–10.4)
CHLORIDE SERPL-SCNC: 100 MMOL/L (ref 98–107)
CO2 SERPL-SCNC: 23 MMOL/L (ref 21–32)
CREAT SERPL-MCNC: 0.96 MG/DL (ref 0.8–1.5)
ERYTHROCYTE [DISTWIDTH] IN BLOOD BY AUTOMATED COUNT: 26.5 % (ref 11.9–14.6)
GLOBULIN SER CALC-MCNC: 5.9 G/DL (ref 2.3–3.5)
GLUCOSE SERPL-MCNC: 267 MG/DL (ref 65–100)
HCT VFR BLD AUTO: 37.2 % (ref 41.1–50.3)
HGB BLD-MCNC: 10.2 G/DL (ref 13.6–17.2)
MCH RBC QN AUTO: 24.1 PG (ref 26.1–32.9)
MCHC RBC AUTO-ENTMCNC: 27.4 G/DL (ref 31.4–35)
MCV RBC AUTO: 87.9 FL (ref 79.6–97.8)
NRBC # BLD: 0 K/UL (ref 0–0.2)
PLATELET # BLD AUTO: 275 K/UL (ref 150–450)
PMV BLD AUTO: 10 FL (ref 9.4–12.3)
POTASSIUM SERPL-SCNC: 5.1 MMOL/L (ref 3.5–5.1)
PROT SERPL-MCNC: 7.9 G/DL (ref 6.3–8.2)
RBC # BLD AUTO: 4.23 M/UL (ref 4.23–5.6)
SODIUM SERPL-SCNC: 136 MMOL/L (ref 136–145)
WBC # BLD AUTO: 11.3 K/UL (ref 4.3–11.1)

## 2019-08-19 PROCEDURE — 85027 COMPLETE CBC AUTOMATED: CPT

## 2019-08-19 PROCEDURE — 80053 COMPREHEN METABOLIC PANEL: CPT

## 2019-08-19 PROCEDURE — 36415 COLL VENOUS BLD VENIPUNCTURE: CPT

## 2019-08-23 ENCOUNTER — PATIENT OUTREACH (OUTPATIENT)
Dept: CASE MANAGEMENT | Age: 69
End: 2019-08-23

## 2019-10-01 ENCOUNTER — PATIENT OUTREACH (OUTPATIENT)
Dept: CASE MANAGEMENT | Age: 69
End: 2019-10-01

## 2019-10-01 NOTE — PROGRESS NOTES
Outreached to f/u with patient for [facility] Spoke with patient's wife and she stated that patient had passed away while in rehab Case closed due to patient  This note will not be viewable in [address]

## 2021-06-22 NOTE — PROGRESS NOTES
Hospitalist Progress Note 2019 Admit Date: 2019  2:33 PM  
NAME: Thomas Lujan :  1950 MRN:  [id number] Attending: Harriet Harada, MD 
PCP:  Evelina Wagner MD 
 
SUBJECTIVE:  
Thomas Lujan is a 68 y.o. morbidly obese white male with a PMH of aflutter on coumadin, CAD, cardiomyopathy, chronic systolic CHF, CKD 3, diabetic peripheral neuropathy, HTN, JASON using CPAP, DM II, chronic ulcerations of LE bilaterally who presented  to hospital after multiple falls and with increased confusion.  Wife reported pt had started taking ambien and hydrocodone since a fall with lower back pain last week.  Pt found to have a left humeral fx. Ortho consulted, deemed non surgical.  CT head showed no acute findings.  Creatinine elevated at 2.47 on admission, baseline around 1.8, has trended down to 1.4 with IVFs.  Wound care consulted for LE wounds.  Ortho ordered brace from  [employer] [facility], awaiting. Patient has been noncompliant with working with PT wanting to be completely without pain. Re emphasized how this is an unrealistic expectation and how narcotics decrease respiratory drive and led to his fall and altered mental status.  overnight became SOB and is currently on 5L NC. No accessory muscle use but has crackles and is edematous. Given lasix and monitoring I&os. 2019: Pt still on 4L o2 with base at 2L. C/o pain \"everywhere and a 10. Worse in back. Sacral ulcer pain, leg pain, shoulder pain. abd pain. +Constipation. Wife at bedside, all questions answered. Review of Systems negative with exception of pertinent positives noted above PHYSICAL EXAM  
 
Visit Vitals /63 (BP 1 Location: Right arm, BP Patient Position: At rest) Pulse (!) 55 Temp 98.3 °F (36.8 °C) Resp 24 Wt (!) 161.8 kg (356 lb 11.2 oz) SpO2 96% BMI 49.75 kg/m² Temp (24hrs), Av.2 °F (36.8 °C), Min:97.5 °F (36.4 °C), Max:99.1 °F (37.3 °C) Oxygen Therapy O2 Sat (%): 96 % (06/29/19 1230) Pulse via Oximetry: 67 beats per minute (06/29/19 0725) O2 Device: Nasal cannula (06/29/19 0725) O2 Flow Rate (L/min): 2 l/min (06/29/19 0725) Intake/Output Summary (Last 24 hours) at 6/29/2019 1358 Last data filed at 6/29/2019 1145 Gross per 24 hour Intake  Output 2210 ml Net -2210 ml General: Alert and oriented in painful distress, morbidly obese Eye: EOMI, Normal conjunctiva, Vision Unchanged. HENT: Normocephalic, atraumatic, Normal hearing, moist mucous membranes. Neck: Supple, Non-tender. Respiratory: diminished due to body habitus, Respirations are non-labored. Cardiovascular: irregular rate and rhythm Gastrointestinal: Soft, Non-tender, positive bowel sounds Musculoskeletal: No cyanosis, clubbing. +1 edema. Integumentary: LE BL dressings c/d/i. Neurologic: Alert, Oriented, No focal deficits. Cognition and Speech: Oriented, Speech clear and coherent. Psychiatric: Cooperative, tearful ASSESSMENT Acute on chronic systolic CHF EF 15-06% - s/p lasix 
- monitor I&os - monitor hypoxia - 6/29 required increased o2 to 4L Acute Metabolic Encephalopathy - Due to polypharmacy.   
- Narcotics/sleep aids are not a good idea in this morbidly obese male with heart failure. - continue new regimen: decreased ultram to 50 q8 prn and schedule tylenol 650 mg q8,  skelaxin prn and cymbalta. - restart lyrica L Humeral Fracture s/p fall due to ambien and opioids 
- ortho 2-3 weeks as op 
- cont brace 
- non surgical  
- PT OT 
- STR when stable (has bed at [facility]) Acute kidney injury on CKD  
- Held nephrotoxic medications. - resolved with IVFs 
- likely was prerenal secondary to dehydration  
- now with fluid overload. - IVFs stoped. Lasix given - I&os 
- urology was consulted for difficult de la cruz placement. Aflutter on coumadin, CAD s/p CABG, cardiomyopathy 
- cont coumadin, pharm to dose Acute on Chronic hypoxic resp failure 
- on home o2 2L 
- improving - 6/29 no work of breathing  
- see above Constipation - Add bowel regimen; stool softeners and prn laxatives. diabetic peripheral neuropathy 
- restart lyrica Hypertension 
-  Continue home antihypertensives; bb 
-  Monitor and trend BP.  
- restart cozaar JASON using CPAP 
- aware DM II, hyperglycemia. - ACs qAC & qHS. - ISS. Lantus. Preprandial. 
- Diabetic Diet. - A1C 7.6 Chronic ulcerations of LE bilaterally, PAD 
- dressings with changes as appropriate - Wound care consulted Sacral Ulcer - wound care consulted Obesity - Body mass index is 49.75 kg/m². - due to patient's BMI; diet, exercise and lifestyle changes advised. HLD 
- Continue Statin DVT prophylaxis: Coumadin Status: Full Dispo: to Rehab when stable. Total Time spent: 32 minutes. Counseling & coordinating care dominated the encounter >55%. Counseled patient and family regarding dx, rx, tx. Reviewed prior records, labs, vitals, diagnostic tests, flow sheet, home medications, inpatient medications, nursing and provider notes.  
 
Jerome Banks PA-C, MPAS 
 Yes

## (undated) DEVICE — BIT DRL L145MM DIA3.2MM QUIK CPL W/O STP REUSE

## (undated) DEVICE — DRAPE TWL SURG 16X26IN BLU ORB04] ALLCARE INC]

## (undated) DEVICE — SOLUTION IRRIG 3000ML 0.9% SOD CHL FLX CONT 0797208] ICU MEDICAL INC]

## (undated) DEVICE — 2000CC GUARDIAN II: Brand: GUARDIAN

## (undated) DEVICE — SPONGE LAP 18X18IN STRL -- 5/PK

## (undated) DEVICE — DRAPE,TOP,102X53,STERILE: Brand: MEDLINE

## (undated) DEVICE — PREP SKN CHLRAPRP APPL 10.5ML --

## (undated) DEVICE — INTENDED TO BE USED TO OCCLUDE, RETRACT AND IDENTIFY ARTERIES, VEINS, TENDONS AND NERVES IN SURGICAL PROCEDURES: Brand: STERION®  VESSEL LOOP

## (undated) DEVICE — DRAPE XR C ARM 41X74IN LF --

## (undated) DEVICE — 3M™ STERI-DRAPE™ INCISE DRAPE 1050 (60CM X 45CM): Brand: STERI-DRAPE™

## (undated) DEVICE — AMD ANTIMICROBIAL BANDAGE ROLL,6 PLY: Brand: KERLIX

## (undated) DEVICE — SCREW BNE L34MM DIA4.5MM PROX CORT TIB S STL ST LOK FULL
Type: IMPLANTABLE DEVICE | Site: ARM | Status: NON-FUNCTIONAL
Removed: 2019-07-22

## (undated) DEVICE — Device

## (undated) DEVICE — SUTURE ETHLN SZ 2-0 L18IN NONABSORBABLE BLK L26MM FS 3/8 664G

## (undated) DEVICE — SCREW BNE L44MM DIA4.5MM PROX CORT TIB S STL ST LOK FULL
Type: IMPLANTABLE DEVICE | Site: ARM | Status: NON-FUNCTIONAL
Removed: 2019-07-22

## (undated) DEVICE — DRAPE SHT 3 QTR PROXIMA 53X77 --

## (undated) DEVICE — Z DISCONTINUED PER MEDLINE GOWN ISOLATN 2XL BLU POLYPR CLS BK FLD PROTCT NK WAIST TIE

## (undated) DEVICE — BANDAGE COMPR SELF ADH 5 YDX4 IN TAN STRL PREMIERPRO LF

## (undated) DEVICE — SHEET, DRAPE, SPLIT, STERILE: Brand: MEDLINE

## (undated) DEVICE — IMPLANTABLE DEVICE
Type: IMPLANTABLE DEVICE | Site: ARM | Status: NON-FUNCTIONAL
Removed: 2019-07-22

## (undated) DEVICE — PLATE BNE W13.5XL206MM THK4.2MM 11 H BILAT S STL NAR LOK
Type: IMPLANTABLE DEVICE | Site: ARM | Status: NON-FUNCTIONAL
Removed: 2019-07-22

## (undated) DEVICE — GUARDIAN LVC: Brand: GUARDIAN

## (undated) DEVICE — CUFF THER CRYO UNIV 32-48 IN FOR 81-122CM CHST CIRC AIRCAST

## (undated) DEVICE — ABDOMINAL PAD: Brand: DERMACEA

## (undated) DEVICE — ELECTRODE NDL 2.8IN COAT VALLEYLAB

## (undated) DEVICE — CARDINAL HEALTH FLEXIBLE LIGHT HANDLE COVER: Brand: CARDINAL HEALTH

## (undated) DEVICE — HIGH CAPACITY FAN SPRAY TIP WITH SHIELD: Brand: PULSAVAC®

## (undated) DEVICE — SURGICAL PROCEDURE PACK BASIC ST FRANCIS

## (undated) DEVICE — SCREW BNE L32MM DIA4.5MM PROX CORT TIB S STL ST LOK FULL
Type: IMPLANTABLE DEVICE | Site: ARM | Status: NON-FUNCTIONAL
Removed: 2019-07-22

## (undated) DEVICE — STOCKINETTE TUBE 6X48 -- MEDICHOICE

## (undated) DEVICE — (D)STRIP SKN CLSR 0.5X4IN WHT --

## (undated) DEVICE — SLING ARM AD ULT

## (undated) DEVICE — SCREW BNE L40MM DIA4.5MM PROX CORT TIB S STL ST LOK FULL
Type: IMPLANTABLE DEVICE | Site: ARM | Status: NON-FUNCTIONAL
Removed: 2019-07-22

## (undated) DEVICE — AMD ANTIMICROBIAL GAUZE SPONGES,12 PLY USP TYPE VII, 0.2% POLYHEXAMETHYLENE BIGUANIDE HCI (PHMB): Brand: CURITY

## (undated) DEVICE — 4.5MM NARROW LCP® PLATE 5 HOLES/98MM
Type: IMPLANTABLE DEVICE | Site: ARM | Status: NON-FUNCTIONAL
Brand: LCP
Removed: 2019-07-22

## (undated) DEVICE — SCREW BNE L36MM DIA4.5MM PROX CORT TIB S STL ST LOK FULL
Type: IMPLANTABLE DEVICE | Site: ARM | Status: NON-FUNCTIONAL
Removed: 2019-07-22